# Patient Record
Sex: FEMALE | Race: WHITE
[De-identification: names, ages, dates, MRNs, and addresses within clinical notes are randomized per-mention and may not be internally consistent; named-entity substitution may affect disease eponyms.]

---

## 2018-02-25 ENCOUNTER — HOSPITAL ENCOUNTER (OUTPATIENT)
Dept: HOSPITAL 88 - ER | Age: 48
Setting detail: OBSERVATION
LOS: 3 days | Discharge: HOME | End: 2018-02-28
Attending: INTERNAL MEDICINE | Admitting: INTERNAL MEDICINE
Payer: COMMERCIAL

## 2018-02-25 VITALS — SYSTOLIC BLOOD PRESSURE: 134 MMHG | DIASTOLIC BLOOD PRESSURE: 74 MMHG

## 2018-02-25 VITALS — DIASTOLIC BLOOD PRESSURE: 74 MMHG | SYSTOLIC BLOOD PRESSURE: 134 MMHG

## 2018-02-25 VITALS — BODY MASS INDEX: 16.1 KG/M2 | HEIGHT: 60 IN | WEIGHT: 82.02 LBS

## 2018-02-25 DIAGNOSIS — H81.393: Primary | ICD-10-CM

## 2018-02-25 DIAGNOSIS — G89.29: ICD-10-CM

## 2018-02-25 DIAGNOSIS — R55: ICD-10-CM

## 2018-02-25 DIAGNOSIS — F17.210: ICD-10-CM

## 2018-02-25 DIAGNOSIS — R07.89: ICD-10-CM

## 2018-02-25 LAB
ALBUMIN SERPL-MCNC: 4.4 G/DL (ref 3.5–5)
ALBUMIN/GLOB SERPL: 1.2 {RATIO} (ref 0.8–2)
ALP SERPL-CCNC: 88 IU/L (ref 40–150)
ALT SERPL-CCNC: 50 IU/L (ref 0–55)
AMPHETAMINES UR QL SCN>1000 NG/ML: NEGATIVE
ANION GAP SERPL CALC-SCNC: 21.5 MMOL/L (ref 8–16)
BASOPHILS # BLD AUTO: 0 10*3/UL (ref 0–0.1)
BASOPHILS NFR BLD AUTO: 0.6 % (ref 0–1)
BENZODIAZ UR QL SCN: POSITIVE
BUN SERPL-MCNC: < 5 MG/DL (ref 7–26)
BUN/CREAT SERPL: 8 (ref 6–25)
CALCIUM SERPL-MCNC: 9.5 MG/DL (ref 8.4–10.2)
CHLORIDE SERPL-SCNC: 95 MMOL/L (ref 98–107)
CK MB SERPL-MCNC: 0.8 NG/ML (ref 0–5)
CK SERPL-CCNC: 83 IU/L (ref 29–168)
CO2 SERPL-SCNC: 23 MMOL/L (ref 22–29)
DEPRECATED FTI SERPL-MCNC: 1.7 UG/DL (ref 1.4–3.8)
DEPRECATED NEUTROPHILS # BLD AUTO: 4.5 10*3/UL (ref 2.1–6.9)
EGFRCR SERPLBLD CKD-EPI 2021: > 60 ML/MIN (ref 60–?)
EOSINOPHIL # BLD AUTO: 0 10*3/UL (ref 0–0.4)
EOSINOPHIL NFR BLD AUTO: 0.4 % (ref 0–6)
ERYTHROCYTE [DISTWIDTH] IN CORD BLOOD: 12.6 % (ref 11.7–14.4)
GLOBULIN PLAS-MCNC: 3.7 G/DL (ref 2.3–3.5)
GLUCOSE SERPLBLD-MCNC: 124 MG/DL (ref 74–118)
HCT VFR BLD AUTO: 40 % (ref 34.2–44.1)
HGB BLD-MCNC: 14 G/DL (ref 12–16)
LYMPHOCYTES # BLD: 0.4 10*3/UL (ref 1–3.2)
LYMPHOCYTES NFR BLD AUTO: 6.8 % (ref 18–39.1)
MCH RBC QN AUTO: 33.7 PG (ref 28–32)
MCHC RBC AUTO-ENTMCNC: 35 G/DL (ref 31–35)
MCV RBC AUTO: 96.4 FL (ref 81–99)
MONOCYTES # BLD AUTO: 0.6 10*3/UL (ref 0.2–0.8)
MONOCYTES NFR BLD AUTO: 10.1 % (ref 4.4–11.3)
NEUTS SEG NFR BLD AUTO: 81.5 % (ref 38.7–80)
PCP UR QL SCN: NEGATIVE
PLATELET # BLD AUTO: 91 X10E3/UL (ref 140–360)
POTASSIUM SERPL-SCNC: 3.5 MMOL/L (ref 3.5–5.1)
RBC # BLD AUTO: 4.15 X10E6/UL (ref 3.6–5.1)
SODIUM SERPL-SCNC: 136 MMOL/L (ref 136–145)
TSH SERPL DL<=0.005 MIU/L-ACNC: 2.48 UIU/ML (ref 0.35–4.94)

## 2018-02-25 PROCEDURE — 71046 X-RAY EXAM CHEST 2 VIEWS: CPT

## 2018-02-25 PROCEDURE — 80307 DRUG TEST PRSMV CHEM ANLYZR: CPT

## 2018-02-25 PROCEDURE — 93017 CV STRESS TEST TRACING ONLY: CPT

## 2018-02-25 PROCEDURE — 70450 CT HEAD/BRAIN W/O DYE: CPT

## 2018-02-25 PROCEDURE — 84479 ASSAY OF THYROID (T3 OR T4): CPT

## 2018-02-25 PROCEDURE — 36415 COLL VENOUS BLD VENIPUNCTURE: CPT

## 2018-02-25 PROCEDURE — 93306 TTE W/DOPPLER COMPLETE: CPT

## 2018-02-25 PROCEDURE — 80053 COMPREHEN METABOLIC PANEL: CPT

## 2018-02-25 PROCEDURE — 82553 CREATINE MB FRACTION: CPT

## 2018-02-25 PROCEDURE — 99284 EMERGENCY DEPT VISIT MOD MDM: CPT

## 2018-02-25 PROCEDURE — 82550 ASSAY OF CK (CPK): CPT

## 2018-02-25 PROCEDURE — 84443 ASSAY THYROID STIM HORMONE: CPT

## 2018-02-25 PROCEDURE — 84484 ASSAY OF TROPONIN QUANT: CPT

## 2018-02-25 PROCEDURE — 78452 HT MUSCLE IMAGE SPECT MULT: CPT

## 2018-02-25 PROCEDURE — 80061 LIPID PANEL: CPT

## 2018-02-25 PROCEDURE — 87400 INFLUENZA A/B EACH AG IA: CPT

## 2018-02-25 PROCEDURE — 85025 COMPLETE CBC W/AUTO DIFF WBC: CPT

## 2018-02-25 PROCEDURE — 84436 ASSAY OF TOTAL THYROXINE: CPT

## 2018-02-25 PROCEDURE — 93005 ELECTROCARDIOGRAM TRACING: CPT

## 2018-02-25 PROCEDURE — 93880 EXTRACRANIAL BILAT STUDY: CPT

## 2018-02-25 NOTE — XMS REPORT
Patient Summary Document

 Created on: 2018



ROCK HILL

External Reference #: [id number]

: 1970

Sex: Female



Demographics







 Address  [address] [place] [address]

 

 Home Phone  [phone]

 

 Preferred Language  Unknown

 

 Marital Status  Unknown

 

 Rastafari Affiliation  Unknown

 

 Race  Unknown

 

 Additional Race(s)  

 

 Ethnic Group  Unknown





Author







 Author  [facility]

 

 [facility]

 

 Address  Unknown

 

 Phone  Unavailable







Care Team Providers







 Care Team Member Name  Role  Phone

 

 JESSICA SEYMOUR  Unavailable  Unavailable







Problems

This patient has no known problems.



Allergies, Adverse Reactions, Alerts

This patient has no known allergies or adverse reactions.



Medications

This patient has no known medications.



Results







 Test Description  Test Time  Test Comments  Text Results  Atomic Results  
Result Comments









 CHEST 2 VIEWS            [place] [address]      Patient Name: ROCK HILL   
MR #: [id number]    : 1970 Age/Sex: 47/F  Acct #: [id number] Req #: 
[id number]  Adm Physician:     Ordered by: JESSICA SEYMOUR MD  Report #: [id number]
[id number]   Location: ER  Room/Bed:     _____________________________________________
______________________________________________________    Procedure: [id number] 
DX/CHEST 2 VIEWS  Exam Date: 18                            Exam Time: 
1642       REPORT STATUS: Signed    EXAMINATION:  CHEST 2 VIEWS    2018 4:
38 PM      COMPARISON:  None      INDICATION: Chills           DISCUSSION:   
LINES:  None.      LUNGS:  The lungs are well inflated and clear. No pneumonia 
or pulmonary edema.      PLEURA:  No pleural effusion or pneumothorax.      
HEART AND MEDIASTINUM:  The cardiomediastinal silhouette is unremarkable.      
BONES AND SOFT TISSUES:  No acute osseous lesion. The soft tissues are normal. 
        IMPRESSION:    No acute cardiopulmonary disease.      Alberto Pelaez MD
      Signed by: STEPHANIE DavilaD. on 2018 5:29 PM        
Dictated By: ALBERTO PELAEZ MD  Electronically Signed By: ALBERTO PELAEZ MD 
on 18  Transcribed By: LEROY on 18       COPY TO:   
JESSICA SEYMOUR MD

## 2018-02-25 NOTE — DIAGNOSTIC IMAGING REPORT
EXAMINATION:  CHEST 2 VIEWS    2/25/2018 4:38 PM



COMPARISON:  None



INDICATION: Chills

     

DISCUSSION:

LINES:  None.



LUNGS:  The lungs are well inflated and clear. No pneumonia or pulmonary edema.



PLEURA:  No pleural effusion or pneumothorax.



HEART AND MEDIASTINUM:  The cardiomediastinal silhouette is unremarkable.



BONES AND SOFT TISSUES:  No acute osseous lesion. The soft tissues are normal.





IMPRESSION: 

No acute cardiopulmonary disease.



Dustin Pelaez MD



Signed by: Dr. Dustin Pelaez M.D. on 2/25/2018 5:29 PM

## 2018-02-26 VITALS — DIASTOLIC BLOOD PRESSURE: 79 MMHG | SYSTOLIC BLOOD PRESSURE: 133 MMHG

## 2018-02-26 VITALS — DIASTOLIC BLOOD PRESSURE: 56 MMHG | SYSTOLIC BLOOD PRESSURE: 119 MMHG

## 2018-02-26 VITALS — SYSTOLIC BLOOD PRESSURE: 104 MMHG | DIASTOLIC BLOOD PRESSURE: 71 MMHG

## 2018-02-26 VITALS — SYSTOLIC BLOOD PRESSURE: 125 MMHG | DIASTOLIC BLOOD PRESSURE: 70 MMHG

## 2018-02-26 VITALS — DIASTOLIC BLOOD PRESSURE: 77 MMHG | SYSTOLIC BLOOD PRESSURE: 125 MMHG

## 2018-02-26 VITALS — DIASTOLIC BLOOD PRESSURE: 76 MMHG | SYSTOLIC BLOOD PRESSURE: 137 MMHG

## 2018-02-26 VITALS — DIASTOLIC BLOOD PRESSURE: 72 MMHG | SYSTOLIC BLOOD PRESSURE: 132 MMHG

## 2018-02-26 VITALS — SYSTOLIC BLOOD PRESSURE: 132 MMHG | DIASTOLIC BLOOD PRESSURE: 72 MMHG

## 2018-02-26 VITALS — SYSTOLIC BLOOD PRESSURE: 133 MMHG | DIASTOLIC BLOOD PRESSURE: 79 MMHG

## 2018-02-26 VITALS — SYSTOLIC BLOOD PRESSURE: 129 MMHG | DIASTOLIC BLOOD PRESSURE: 71 MMHG

## 2018-02-26 LAB
CHOLEST SERPL-MCNC: 207 MD/DL (ref 0–199)
CHOLEST/HDLC SERPL: 1.8 {RATIO} (ref 3–3.6)
CK MB SERPL-MCNC: 0.7 NG/ML (ref 0–5)
CK MB SERPL-MCNC: 0.7 NG/ML (ref 0–5)
CK SERPL-CCNC: 58 IU/L (ref 29–168)
CK SERPL-CCNC: 64 IU/L (ref 29–168)
HDLC SERPL-MSCNC: 115 MG/DL (ref 40–60)
LDLC SERPL CALC-MCNC: 83 MG/DL (ref 60–130)
TRIGL SERPL-MCNC: 43 MG/DL (ref 0–149)

## 2018-02-26 RX ADMIN — MECLIZINE HYDROCHLORIDE SCH MG: 12.5 TABLET ORAL at 20:21

## 2018-02-26 RX ADMIN — NICOTINE SCH MG: 7 PATCH, EXTENDED RELEASE TRANSDERMAL at 10:40

## 2018-02-26 RX ADMIN — MECLIZINE HYDROCHLORIDE SCH MG: 12.5 TABLET ORAL at 14:34

## 2018-02-26 NOTE — DIAGNOSTIC IMAGING REPORT
Examination: CT BRAIN WITHOUT CONTRAST



History:Vertigo. Syncope.

Comparison studies:None



Technique:

Axial images were obtained from the skull base to the vertex.

Coronal and sagittal images reconstructed from the axial data.

Intravenous contrast: None



Findings:



Scalp: No abnormalities.

Bones: No fractures, blastic or lytic lesions.



Brain sulci: Mild volume loss for age.

Ventricles: Normal in size and configuration. No hydrocephalus.



Extra-axial space:

No abnormalities.



Parenchyma: 

No abnormal densities. 

No masses, hemorrhage, or acute or chronic cortical based vascular insults..



Sellar/suprasellar region: No abnormalities.

Craniocervical junction: Patent foramen magnum. No Chiari one malformation.



Incidental findings: 

None.



Impression:

 

1.  No acute intracranial abnormalities.



2.  Mild volume loss.



Signed by: Dr. Roz Walters M.D. on 2/26/2018 5:00 PM

## 2018-02-26 NOTE — HISTORY AND PHYSICAL
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 

discomfort, dizziness for 24 hours, losing her balance, vertigo when she 

turns her head.  History of amblyopia, history of blindness in the right 

eye, history of surgery in the past.  She had an episode of near syncope 

when everything seemed to go black.  She was sweating and shaking.  She has 

had 8 back surgeries, back pain, degenerative disease.  



FAMILY HISTORY:  Positive for cirrhosis due to hepatitis.  



She smokes 6 cigarettes a day and was a heavy smoker in the past.  Drinks 4 

beers a day.  



Takes Ativan as needed.  Worked in [employer].  



ALLERGIES:  NO KNOWN ALLERGIES.  



Blind in the right eye.  No respiratory problems.  No  problems.  No 

depression.  No heart problems.  No stomach problems.  Degenerative disease 

of the spine.  Blind in right eye.  Apparent vertigo.



PHYSICAL EXAMINATION 

VITALS:  Temperature 97.5, pulse 74, respirations 18, blood pressure 

132/71.

HEENT:  Head is normocephalic and atraumatic.  Eyes:  Extraocular movements 

intact. 

GENERAL:  She is a slight white female in no acute distress.  Appears 

stated age.  

LUNGS:  Clear.

HEART:  Regular rhythm.

ABDOMEN:  Nontender.  

EXTREMITIES:  Not edematous.  



Cholesterol 207, LDL 83, HDL 1115.  Normal TSH.  Hemoglobin 14.  Blood 

sugar 124.  



Will request neurology opinion.  Cardiology has already been consulted.  

Cigarette smoking advised.  Trial of nicotine patch. 



Thank you for this kind referral. Chest x-ray is clear.  









DD:  02/26/2018 09:16

DT:  02/26/2018 09:23

Job#:  [id number]

## 2018-02-26 NOTE — CONSULTATION
DATE OF CONSULTATION:  February 26, 2018, at 3 o'clock in the evening.



NEUROLOGICAL CONSULTATION



A patient of Dr. Kamron Thornton.



REASON FOR CONSULTATION:  Vertigo.



HISTORY OF PRESENT ILLNESS:  This is a 47-year-old female who yesterday 

morning woke up at approximately around 9 o'clock when suddenly she started 

having an episode of dizziness, vertigo which she described as spinning 

sensation associated with nausea, vomiting, unable to ambulate. She is 

complaining of some blurred vision but no diplopia, no speech nor 

swallowing difficulty, no facial paresthesia, no paresthesia of the lips or 

tongue, no focal paresthesia, no focal weakness. Up to about 3 o'clock she 

continued with the vertigo, aggravated when she turned the head to either 

side, when she sat up or she bent over. So, finally her  brought her 

to the emergency room for further evaluation and management.



The patient describes some previous episodes like this 2 or 3 years ago, 

similar to this, but she never was evaluated for that.



At the present time, she is feeling better but still dizzy.



PAST HISTORY:  She denies hypertension, diabetes mellitus, heart condition, 

but she has multiple back surgeries and fusion where apparently they put 

some screws there but she is not having any particular back pain.



ALLERGIES:  NONE KNOWN. 



SOCIAL HISTORY:  She smokes approximately 6 cigarettes a day. She drinks 4 

beers every evening. She is , lives with her .



PHYSICAL EXAMINATION

VITAL SIGNS:  Blood pressure 130/80, pulse 76 regular, afebrile.

LUNGS:  Clear to auscultation.

HEART:  Regular sinus rhythm. No murmur.

ABDOMEN:  Soft. No tenderness. No organomegaly.

MUSCULOSKELETAL/LOWER EXTREMITIES:  No edema or cyanosis, no clubbing. 

NEUROLOGIC

Mental status:  She is oriented x3. Speech clear, no dysarthria or 

dysphagia. 

Cranial nerves:  Pupils were both equal and reactive. External ocular 

movements were full. 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 

childhood. No facial weakness. Facial sensation normal. Tongue protrudes 

midline. Palatal movements normal.

Motor power:  Upper and lower extremities:  She is able to elevate against 

gravity without any difficulty. Strength upper extremities:  Abduction of 

the arms 5/5. Flexion and extension of the arms 5/5. Dorsiflexion of the 

wrists 5/5. Hand  5/5 bilaterally. Lower extremities:  Straight leg 

raising leg 80 degrees without any discomfort. Flexion of the hips 5/5. 

Flexion and extension of the knees 5/5. Dorsiflexion of the ankles 5/5. 

Plantar flexion5/5.

Deep tendon reflexes:  Triceps, biceps, radials 1+.  Knee jerks 1+ 

bilaterally. Ankle jerks absent bilaterally. Plantar stimulation is down 

bilaterally.

Coordination:  Finger-to-nose is normal.

HEAD:  Normocephalic. No tenderness to pressure.

NECK:  Supple. Normal range of motion with aggravation with the dizziness. 

Carotid pulsations were present bilaterally. There were no bruits.

GAIT:  I asked the patient to stand up slow. She is able to do it and is 

able to walk alone without any unsteadiness. She is able to turn around 

slow. The Romberg test negative.



It seems that the patient is feeling better but still dizzy. 



IMPRESSION:   Episode of vertigo, etiology probably peripheral vestibular 

dysfunction.



There is no evidence to suspect posterior fossa TIA.



We cannot do an MRI because she has some metal in the back after several 

surgeries. Will request a CT scan of the brain without contrast. Most 

likely by tomorrow she will be doing much better. 



In the meantime, continue with the meclizine.





 





DD:  02/26/2018 15:39

DT:  02/26/2018 16:20

Job#:  [id number]

## 2018-02-26 NOTE — CONSULTATION
DATE OF CONSULTATION:  February 26, 2018 



CARDIOLOGY CONSULTATION 



REQUESTING PHYSICIAN:  Dr. Tolentino. 



REASON FOR CONSULTATION:  Chest pain. 



HISTORY OF PRESENT ILLNESS:  This is a 47-year-old woman without 

significant past medical history who presents with complaints of dizziness 

and chest pain.  She began feeling dizzy around 10 a.m. yesterday.  This 

occurred while she was sitting down and was worse with turning her head or 

looking down.  There were times where she felt like she was about to black 

out because of this.  She attempted eating a sandwich thinking it may be 

her sugar without any improvement, so she presented to the ER for further 

evaluation.  In addition, she began having chest pain approximately 2/10 in 

severity around the same time.  The pain felt like a twitching pressure 

sensation that lasted seconds off and on throughout the day and was 

associated with diaphoresis.  There was no radiation of shortness of breath 

or nausea.  



REVIEW OF SYSTEMS:  Negative except as per HPI.  



PAST MEDICAL HISTORY:  None.  



PAST SURGICAL HISTORY:  Back surgery x8.  Eye surgery.



ALLERGIES:  PLEASE SEE EMR.  



MEDICATIONS:  Please see medication list.  



SOCIAL HISTORY:  She smoked up to half a pack a day for the last 30 years.  

She drinks 4 beers a day.  She denies any illicit drugs.  



FAMILY HISTORY:  Noncontributory.   



PHYSICAL EXAMINATION 

VITAL SIGNS:  Temperature 97.5 degrees, pulse 74, respiratory rate 18, 

blood pressure 132/72, oxygen saturation 99% on room air. 

GENERAL: A well-nourished, well-developed woman in no acute distress.  

HEENT:  Normocephalic, atraumatic.  Pupils are equal.  No scleral icterus. 

NECK:  Supple.  No thyromegaly or cervical lymphadenopathy.  No carotid 

bruits. 

LUNGS:  Clear to auscultation bilaterally.  No wheezes or crackles. 

CARDIOVASCULAR:  Normal rate, regular rhythm.  No murmurs.  Normal S1 and 

S2.  

ABDOMEN:  Soft and nontender. 

EXTREMITIES:  No edema. 



LABORATORY DATA:  WBC 5.45, hemoglobin 14, hematocrit 40, platelets 91,000, 

sodium 136, potassium 3.5, chloride 95, CO2 of 23, BUN less than 5, 

creatinine 0.62, troponin less than 0.001.  Cholesterol 207, LDL 83, HDL 

115, triglycerides 43.  



Chest x-ray no acute cardiopulmonary disease.  EKG, sinus tachycardia, 

right atrial enlargement.



IMPRESSION 

1. Dizziness/presyncope. 

2. Chest pain. 



RECOMMENDATIONS:  The patient's presyncope is most consistent with vertigo; 

however, we will check orthostatic vitals.  Continue monitoring on 

telemetry for any arrhythmias that may explain her symptoms.  Obtain 

echocardiogram and carotid Dopplers.  Given the patient's risk factors, she 

warrants ischemic evaluation with pharmacologic nuclear stress test.  She 

is unable to use treadmill due to her vertigo.  Unfortunately, she had 

caffeine this morning.  If the patient remains admitted through tomorrow, 

plan for stress test in the morning, otherwise, she can follow up for 

outpatient nuclear stress test instead.  



Thank you for this consult.  We will continue to follow. 



 



 





DD:  02/26/2018 15:51

DT:  02/26/2018 16:15

Job#:  [id number]

## 2018-02-27 VITALS — DIASTOLIC BLOOD PRESSURE: 68 MMHG | SYSTOLIC BLOOD PRESSURE: 112 MMHG

## 2018-02-27 VITALS — DIASTOLIC BLOOD PRESSURE: 81 MMHG | SYSTOLIC BLOOD PRESSURE: 103 MMHG

## 2018-02-27 VITALS — SYSTOLIC BLOOD PRESSURE: 121 MMHG | DIASTOLIC BLOOD PRESSURE: 75 MMHG

## 2018-02-27 VITALS — DIASTOLIC BLOOD PRESSURE: 70 MMHG | SYSTOLIC BLOOD PRESSURE: 120 MMHG

## 2018-02-27 VITALS — DIASTOLIC BLOOD PRESSURE: 63 MMHG | SYSTOLIC BLOOD PRESSURE: 107 MMHG

## 2018-02-27 VITALS — DIASTOLIC BLOOD PRESSURE: 80 MMHG | SYSTOLIC BLOOD PRESSURE: 119 MMHG

## 2018-02-27 VITALS — SYSTOLIC BLOOD PRESSURE: 146 MMHG | DIASTOLIC BLOOD PRESSURE: 83 MMHG

## 2018-02-27 RX ADMIN — Medication SCH MG: at 17:43

## 2018-02-27 RX ADMIN — MECLIZINE HYDROCHLORIDE SCH MG: 12.5 TABLET ORAL at 05:24

## 2018-02-27 RX ADMIN — MECLIZINE HYDROCHLORIDE SCH MG: 12.5 TABLET ORAL at 13:19

## 2018-02-27 RX ADMIN — FLUTICASONE PROPIONATE SCH EA: 50 SPRAY, METERED NASAL at 17:43

## 2018-02-27 RX ADMIN — MECLIZINE HYDROCHLORIDE SCH MG: 12.5 TABLET ORAL at 21:15

## 2018-02-27 RX ADMIN — HEPARIN SODIUM SCH UNIT: 5000 INJECTION INTRAVENOUS; SUBCUTANEOUS at 21:00

## 2018-02-27 RX ADMIN — NICOTINE SCH MG: 7 PATCH, EXTENDED RELEASE TRANSDERMAL at 08:34

## 2018-02-27 NOTE — PROGRESS NOTE
DATE:  February 27, 2018 



CARDIOLOGY PROGRESS NOTE 



SUBJECTIVE:  Patient denies chest pain or shortness of breath.  She reports 

her dizziness is 

improved but remains present. 



OBJECTIVE

VITALS:  Temperature 97.4 degrees, pulse 97, respiratory rate 18, blood 

pressure 112/68, oxygen saturation 98% on room air.

GENERAL:  Awake, alert, in no acute distress.

LUNGS:  Clear to auscultation bilaterally.  No wheezes or crackles. 

CARDIOVASCULAR:  Normal rate, regular rhythm.  No murmur.  Normal S1 and 

S2.  

ABDOMEN:  Soft and nontender. 

EXTREMITIES:  No edema. 



CARDIAC MEDICATIONS 

1. Nicotine patch 7 mg topically daily.

2. Heparin 5,000 units subcutaneous q.12 h. 



LABS:  None today.



TELEMETRY:  Normal sinus rhythm.



IMPRESSION 

1. Dizziness/presyncope. 

2. Chest pain. 



RECOMMENDATIONS:  The patient will proceed with pharmacologic nuclear 

stress test today to further evaluate her chest pain.  Suspect the 

patient's dizziness is most likely vertigo based on the patient's 

description.  Continue current cardiac medications.  Discussed heart 

healthy, low-fat, low-cholesterol diet and exercise with the patient due to 

hyperlipidemia.  



Thank you for this consult.  We will continue to follow. 







DD:  02/27/2018 19:56

DT:  02/27/2018 19:58

Job#:  [id number]

## 2018-02-27 NOTE — CARDIOLOGY REPORT
DATE OF STUDY:  February 27, 2018 





PROCEDURE TITLE  Rest stress single isotope SPECT imaging with 

pharmacologic stress and gated SPECT imaging.



INDICATIONS:  Chest pain. 



PROCEDURE:  Pharmacologic stress testing was performed with regadenoson per 

protocol.  The heart rate was 85 beats per minute at baseline and increased 

to 139 beats per minute during the regadenoson infusion.  The rest blood 

pressure was 116/69 and increased to 134/76 mmHg, which is a normal 

response.  The patient did not develop any significant symptoms.  Resting 

electrocardiogram demonstrated normal sinus rhythm.  There were no ST 

segment changes consistent with myocardial ischemia.  



Myocardial perfusion imaging was performed at rest following the injection 

of 29 mCi of tetrofosmin.  At peak pharmacologic effect, the patient was 

injected with 27 mCi of tetrofosmin.  Gated post stress tomographic imaging 

was performed.



FINDINGS: The overall quality of the study is fair.  Left ventricular 

cavity is noted to be normal size on the rest and stress studies.  Gated 

SPECT imaging reveals normal myocardial thickening and wall motion.  The 

left ventricular ejection fraction was calculated to be 69%.





IMPRESSION:  Myocardial perfusion imaging is normal.  Overall left 

ventricular systolic function was normal without regional wall motion 

abnormalities. 







DD:  02/27/2018 15:11

DT:  02/27/2018 15:21

Job#:  [id number]



cc:IVY EVANS M.D.

## 2018-02-28 VITALS — DIASTOLIC BLOOD PRESSURE: 73 MMHG | SYSTOLIC BLOOD PRESSURE: 132 MMHG

## 2018-02-28 VITALS — DIASTOLIC BLOOD PRESSURE: 64 MMHG | SYSTOLIC BLOOD PRESSURE: 136 MMHG

## 2018-02-28 RX ADMIN — HEPARIN SODIUM SCH UNIT: 5000 INJECTION INTRAVENOUS; SUBCUTANEOUS at 09:00

## 2018-02-28 RX ADMIN — MECLIZINE HYDROCHLORIDE SCH MG: 12.5 TABLET ORAL at 05:03

## 2018-02-28 RX ADMIN — FLUTICASONE PROPIONATE SCH EA: 50 SPRAY, METERED NASAL at 09:00

## 2018-02-28 RX ADMIN — Medication SCH MG: at 09:00

## 2018-02-28 RX ADMIN — NICOTINE SCH MG: 7 PATCH, EXTENDED RELEASE TRANSDERMAL at 09:19

## 2018-02-28 NOTE — DISCHARGE SUMMARY
Patient of Dr. Ball.  



A charming but unfortunate 47-year-old woman admitted with vertigo and some 

blurring of vision, history of chronic back pain with multiple surgeries, 

history of smoking 6 cigarettes a day and 4 beers in the evening.   

and lives with her .  She also had atypical chest pain.  Cardiac 

evaluation was essentially normal.  Diagnosed with peripheral vestibular 

dysfunction.  She was treated with Antivert and improved.  Is discharged to 

be followed by Dr. Blal as an outpatient.  Hemoglobin was 14.  TSH was 

2.47.  Cholesterol 207, LDL 83 and .  She is discharged much 

improved.  She is ambulating and has no complaints today.  



 



 _________________________________

REGINA HERNANDEZ MD



DD:  02/28/2018 09:20

DT:  02/28/2018 09:30

Job#:  [id number]

## 2018-07-31 ENCOUNTER — HOSPITAL ENCOUNTER (INPATIENT)
Dept: HOSPITAL 88 - ER | Age: 48
LOS: 17 days | Discharge: NURSING, REHAB OR LONG-TERM CARE | DRG: 4 | End: 2018-08-17
Attending: INTERNAL MEDICINE | Admitting: INTERNAL MEDICINE
Payer: COMMERCIAL

## 2018-07-31 VITALS — SYSTOLIC BLOOD PRESSURE: 149 MMHG | DIASTOLIC BLOOD PRESSURE: 85 MMHG

## 2018-07-31 VITALS — DIASTOLIC BLOOD PRESSURE: 81 MMHG | SYSTOLIC BLOOD PRESSURE: 124 MMHG

## 2018-07-31 VITALS — SYSTOLIC BLOOD PRESSURE: 144 MMHG | DIASTOLIC BLOOD PRESSURE: 82 MMHG

## 2018-07-31 VITALS — SYSTOLIC BLOOD PRESSURE: 142 MMHG | DIASTOLIC BLOOD PRESSURE: 83 MMHG

## 2018-07-31 VITALS — SYSTOLIC BLOOD PRESSURE: 86 MMHG | DIASTOLIC BLOOD PRESSURE: 63 MMHG

## 2018-07-31 VITALS — SYSTOLIC BLOOD PRESSURE: 159 MMHG | DIASTOLIC BLOOD PRESSURE: 83 MMHG

## 2018-07-31 VITALS — SYSTOLIC BLOOD PRESSURE: 135 MMHG | DIASTOLIC BLOOD PRESSURE: 80 MMHG

## 2018-07-31 VITALS — DIASTOLIC BLOOD PRESSURE: 85 MMHG | SYSTOLIC BLOOD PRESSURE: 129 MMHG

## 2018-07-31 VITALS — SYSTOLIC BLOOD PRESSURE: 141 MMHG | DIASTOLIC BLOOD PRESSURE: 84 MMHG

## 2018-07-31 VITALS — SYSTOLIC BLOOD PRESSURE: 127 MMHG | DIASTOLIC BLOOD PRESSURE: 76 MMHG

## 2018-07-31 VITALS — DIASTOLIC BLOOD PRESSURE: 81 MMHG | SYSTOLIC BLOOD PRESSURE: 122 MMHG

## 2018-07-31 VITALS — SYSTOLIC BLOOD PRESSURE: 95 MMHG | DIASTOLIC BLOOD PRESSURE: 73 MMHG

## 2018-07-31 VITALS — DIASTOLIC BLOOD PRESSURE: 75 MMHG | SYSTOLIC BLOOD PRESSURE: 103 MMHG

## 2018-07-31 VITALS — SYSTOLIC BLOOD PRESSURE: 140 MMHG | DIASTOLIC BLOOD PRESSURE: 87 MMHG

## 2018-07-31 VITALS — DIASTOLIC BLOOD PRESSURE: 87 MMHG | SYSTOLIC BLOOD PRESSURE: 122 MMHG

## 2018-07-31 VITALS — SYSTOLIC BLOOD PRESSURE: 93 MMHG | DIASTOLIC BLOOD PRESSURE: 61 MMHG

## 2018-07-31 VITALS — DIASTOLIC BLOOD PRESSURE: 87 MMHG | SYSTOLIC BLOOD PRESSURE: 140 MMHG

## 2018-07-31 VITALS — WEIGHT: 121.56 LBS | HEIGHT: 60 IN | BODY MASS INDEX: 23.87 KG/M2

## 2018-07-31 VITALS — DIASTOLIC BLOOD PRESSURE: 81 MMHG | SYSTOLIC BLOOD PRESSURE: 126 MMHG

## 2018-07-31 VITALS — DIASTOLIC BLOOD PRESSURE: 82 MMHG | SYSTOLIC BLOOD PRESSURE: 138 MMHG

## 2018-07-31 VITALS — DIASTOLIC BLOOD PRESSURE: 76 MMHG | SYSTOLIC BLOOD PRESSURE: 137 MMHG

## 2018-07-31 VITALS — DIASTOLIC BLOOD PRESSURE: 80 MMHG | SYSTOLIC BLOOD PRESSURE: 127 MMHG

## 2018-07-31 DIAGNOSIS — D84.9: ICD-10-CM

## 2018-07-31 DIAGNOSIS — F10.20: ICD-10-CM

## 2018-07-31 DIAGNOSIS — D69.59: ICD-10-CM

## 2018-07-31 DIAGNOSIS — R09.02: ICD-10-CM

## 2018-07-31 DIAGNOSIS — J96.21: ICD-10-CM

## 2018-07-31 DIAGNOSIS — R19.7: ICD-10-CM

## 2018-07-31 DIAGNOSIS — E88.09: ICD-10-CM

## 2018-07-31 DIAGNOSIS — F17.210: ICD-10-CM

## 2018-07-31 DIAGNOSIS — F50.2: ICD-10-CM

## 2018-07-31 DIAGNOSIS — E87.1: ICD-10-CM

## 2018-07-31 DIAGNOSIS — D70.9: ICD-10-CM

## 2018-07-31 DIAGNOSIS — K76.0: ICD-10-CM

## 2018-07-31 DIAGNOSIS — E44.1: ICD-10-CM

## 2018-07-31 DIAGNOSIS — D61.818: ICD-10-CM

## 2018-07-31 DIAGNOSIS — J80: ICD-10-CM

## 2018-07-31 DIAGNOSIS — K76.6: ICD-10-CM

## 2018-07-31 DIAGNOSIS — K70.31: ICD-10-CM

## 2018-07-31 DIAGNOSIS — R65.21: ICD-10-CM

## 2018-07-31 DIAGNOSIS — A40.8: Primary | ICD-10-CM

## 2018-07-31 DIAGNOSIS — E87.6: ICD-10-CM

## 2018-07-31 DIAGNOSIS — K31.89: ICD-10-CM

## 2018-07-31 DIAGNOSIS — J69.0: ICD-10-CM

## 2018-07-31 LAB
ALBUMIN SERPL-MCNC: 2.3 G/DL (ref 3.5–5)
ALBUMIN/GLOB SERPL: 0.6 {RATIO} (ref 0.8–2)
ALP SERPL-CCNC: 63 IU/L (ref 40–150)
ALT SERPL-CCNC: 51 IU/L (ref 0–55)
ANION GAP SERPL CALC-SCNC: 16.2 MMOL/L (ref 8–16)
ANION GAP SERPL CALC-SCNC: 18.5 MMOL/L (ref 8–16)
ANISOCYTOSIS BLD QL SMEAR: (no result)
BASE EXCESS BLDA CALC-SCNC: -11 MMOL/L (ref -2–3)
BASE EXCESS BLDA CALC-SCNC: -11 MMOL/L (ref -2–3)
BASE EXCESS BLDA CALC-SCNC: -12 MMOL/L (ref -2–3)
BASOPHILS # BLD AUTO: 0 10*3/UL (ref 0–0.1)
BASOPHILS NFR BLD AUTO: 1.4 % (ref 0–1)
BNP BLD-MCNC: 758.3 PG/ML (ref 0–100)
BUN SERPL-MCNC: 23 MG/DL (ref 7–26)
BUN SERPL-MCNC: 34 MG/DL (ref 7–26)
BUN/CREAT SERPL: 33 (ref 6–25)
BUN/CREAT SERPL: 40 (ref 6–25)
CALCIUM SERPL-MCNC: 7.4 MG/DL (ref 8.4–10.2)
CALCIUM SERPL-MCNC: 8.4 MG/DL (ref 8.4–10.2)
CHLORIDE SERPL-SCNC: 105 MMOL/L (ref 98–107)
CHLORIDE SERPL-SCNC: 89 MMOL/L (ref 98–107)
CK MB SERPL-MCNC: 3.9 NG/ML (ref 0–5)
CK SERPL-CCNC: 1566 IU/L (ref 29–168)
CO2 SERPL-SCNC: 16 MMOL/L (ref 22–29)
CO2 SERPL-SCNC: 21 MMOL/L (ref 22–29)
DEPRECATED APTT PLAS QN: 29.2 SECONDS (ref 23.8–35.5)
DEPRECATED INR PLAS: 1.04
DEPRECATED NEUTROPHILS # BLD AUTO: 1.3 10*3/UL (ref 2.1–6.9)
EGFRCR SERPLBLD CKD-EPI 2021: > 60 ML/MIN (ref 60–?)
EGFRCR SERPLBLD CKD-EPI 2021: > 60 ML/MIN (ref 60–?)
EOSINOPHIL # BLD AUTO: 0 10*3/UL (ref 0–0.4)
EOSINOPHIL NFR BLD AUTO: 0.7 % (ref 0–6)
ERYTHROCYTE [DISTWIDTH] IN CORD BLOOD: 11.9 % (ref 11.7–14.4)
GLOBULIN PLAS-MCNC: 3.7 G/DL (ref 2.3–3.5)
GLUCOSE SERPLBLD-MCNC: 84 MG/DL (ref 74–118)
GLUCOSE SERPLBLD-MCNC: 94 MG/DL (ref 74–118)
HCO3 BLDA-SCNC: 14 MMOL/L (ref 23–28)
HCO3 BLDA-SCNC: 14 MMOL/L (ref 23–28)
HCO3 BLDA-SCNC: 16 MMOL/L (ref 23–28)
HCT VFR BLD AUTO: 32.1 % (ref 34.2–44.1)
HGB BLD-MCNC: 12.1 G/DL (ref 12–16)
HIV1+2 AB SPEC QL IA.RAPID: (no result)
LYMPHOCYTES # BLD: 0.1 10*3/UL (ref 1–3.2)
LYMPHOCYTES NFR BLD AUTO: 4.1 % (ref 18–39.1)
MCH RBC QN AUTO: 34.3 PG (ref 28–32)
MCHC RBC AUTO-ENTMCNC: 37.7 G/DL (ref 31–35)
MCV RBC AUTO: 90.9 FL (ref 81–99)
MONOCYTES # BLD AUTO: 0.1 10*3/UL (ref 0.2–0.8)
MONOCYTES NFR BLD AUTO: 5.4 % (ref 4.4–11.3)
NEUTS SEG NFR BLD AUTO: 88.4 % (ref 38.7–80)
PCO2 BLDA: 221 MMHG (ref 80–105)
PCO2 BLDA: 23 MMHG (ref 41–51)
PCO2 BLDA: 23 MMHG (ref 41–51)
PCO2 BLDA: 41 MMHG (ref 41–51)
PCO2 BLDA: 50 MMHG (ref 80–105)
PCO2 BLDA: 53 MMHG (ref 80–105)
PH BLDA: 7.2 [PH] (ref 7.31–7.41)
PH BLDA: 7.38 [PH] (ref 7.31–7.41)
PH BLDA: 7.39 [PH] (ref 7.31–7.41)
PLAT MORPH BLD: (no result)
PLATELET # BLD AUTO: 72 X10E3/UL (ref 140–360)
PLATELET # BLD EST: (no result) 10*3/UL
POIKILOCYTOSIS BLD QL SMEAR: SLIGHT
POTASSIUM SERPL-SCNC: 2.2 MMOL/L (ref 3.5–5.1)
POTASSIUM SERPL-SCNC: 2.5 MMOL/L (ref 3.5–5.1)
PROTHROMBIN TIME: 12.8 SECONDS (ref 11.9–14.5)
RBC # BLD AUTO: 3.53 X10E6/UL (ref 3.6–5.1)
RBC MORPH BLD: NORMAL
SAO2 % BLDA: 100 % (ref 95–98)
SAO2 % BLDA: 86 % (ref 95–98)
SAO2 % BLDA: 88 % (ref 95–98)
SODIUM SERPL-SCNC: 126 MMOL/L (ref 136–145)
SODIUM SERPL-SCNC: 135 MMOL/L (ref 136–145)
TSH SERPL DL<=0.005 MIU/L-ACNC: 1.9 UIU/ML (ref 0.35–4.94)

## 2018-07-31 PROCEDURE — 76604 US EXAM CHEST: CPT

## 2018-07-31 PROCEDURE — 82805 BLOOD GASES W/O2 SATURATION: CPT

## 2018-07-31 PROCEDURE — 93306 TTE W/DOPPLER COMPLETE: CPT

## 2018-07-31 PROCEDURE — 32555 ASPIRATE PLEURA W/ IMAGING: CPT

## 2018-07-31 PROCEDURE — 87186 SC STD MICRODIL/AGAR DIL: CPT

## 2018-07-31 PROCEDURE — 36600 WITHDRAWAL OF ARTERIAL BLOOD: CPT

## 2018-07-31 PROCEDURE — 84100 ASSAY OF PHOSPHORUS: CPT

## 2018-07-31 PROCEDURE — 83880 ASSAY OF NATRIURETIC PEPTIDE: CPT

## 2018-07-31 PROCEDURE — 94640 AIRWAY INHALATION TREATMENT: CPT

## 2018-07-31 PROCEDURE — 71260 CT THORAX DX C+: CPT

## 2018-07-31 PROCEDURE — 71045 X-RAY EXAM CHEST 1 VIEW: CPT

## 2018-07-31 PROCEDURE — 87070 CULTURE OTHR SPECIMN AEROBIC: CPT

## 2018-07-31 PROCEDURE — 96361 HYDRATE IV INFUSION ADD-ON: CPT

## 2018-07-31 PROCEDURE — 87116 MYCOBACTERIA CULTURE: CPT

## 2018-07-31 PROCEDURE — 82553 CREATINE MB FRACTION: CPT

## 2018-07-31 PROCEDURE — 85025 COMPLETE CBC W/AUTO DIFF WBC: CPT

## 2018-07-31 PROCEDURE — 80202 ASSAY OF VANCOMYCIN: CPT

## 2018-07-31 PROCEDURE — 89051 BODY FLUID CELL COUNT: CPT

## 2018-07-31 PROCEDURE — 76700 US EXAM ABDOM COMPLETE: CPT

## 2018-07-31 PROCEDURE — 82550 ASSAY OF CK (CPK): CPT

## 2018-07-31 PROCEDURE — 87493 C DIFF AMPLIFIED PROBE: CPT

## 2018-07-31 PROCEDURE — 82140 ASSAY OF AMMONIA: CPT

## 2018-07-31 PROCEDURE — 86900 BLOOD TYPING SEROLOGIC ABO: CPT

## 2018-07-31 PROCEDURE — 94003 VENT MGMT INPAT SUBQ DAY: CPT

## 2018-07-31 PROCEDURE — 96366 THER/PROPH/DIAG IV INF ADDON: CPT

## 2018-07-31 PROCEDURE — 94002 VENT MGMT INPAT INIT DAY: CPT

## 2018-07-31 PROCEDURE — 85610 PROTHROMBIN TIME: CPT

## 2018-07-31 PROCEDURE — 82270 OCCULT BLOOD FECES: CPT

## 2018-07-31 PROCEDURE — 86850 RBC ANTIBODY SCREEN: CPT

## 2018-07-31 PROCEDURE — 0BH17EZ INSERTION OF ENDOTRACHEAL AIRWAY INTO TRACHEA, VIA NATURAL OR ARTIFICIAL OPENING: ICD-10-PCS | Performed by: INTERNAL MEDICINE

## 2018-07-31 PROCEDURE — 80048 BASIC METABOLIC PNL TOTAL CA: CPT

## 2018-07-31 PROCEDURE — 70450 CT HEAD/BRAIN W/O DYE: CPT

## 2018-07-31 PROCEDURE — 85730 THROMBOPLASTIN TIME PARTIAL: CPT

## 2018-07-31 PROCEDURE — 83615 LACTATE (LD) (LDH) ENZYME: CPT

## 2018-07-31 PROCEDURE — 85027 COMPLETE CBC AUTOMATED: CPT

## 2018-07-31 PROCEDURE — 84157 ASSAY OF PROTEIN OTHER: CPT

## 2018-07-31 PROCEDURE — 85384 FIBRINOGEN ACTIVITY: CPT

## 2018-07-31 PROCEDURE — 84443 ASSAY THYROID STIM HORMONE: CPT

## 2018-07-31 PROCEDURE — 85007 BL SMEAR W/DIFF WBC COUNT: CPT

## 2018-07-31 PROCEDURE — 87102 FUNGUS ISOLATION CULTURE: CPT

## 2018-07-31 PROCEDURE — 71250 CT THORAX DX C-: CPT

## 2018-07-31 PROCEDURE — 82784 ASSAY IGA/IGD/IGG/IGM EACH: CPT

## 2018-07-31 PROCEDURE — 5A1955Z RESPIRATORY VENTILATION, GREATER THAN 96 CONSECUTIVE HOURS: ICD-10-PCS | Performed by: INTERNAL MEDICINE

## 2018-07-31 PROCEDURE — 87071 CULTURE AEROBIC QUANT OTHER: CPT

## 2018-07-31 PROCEDURE — 86920 COMPATIBILITY TEST SPIN: CPT

## 2018-07-31 PROCEDURE — 83735 ASSAY OF MAGNESIUM: CPT

## 2018-07-31 PROCEDURE — 87081 CULTURE SCREEN ONLY: CPT

## 2018-07-31 PROCEDURE — 85379 FIBRIN DEGRADATION QUANT: CPT

## 2018-07-31 PROCEDURE — 87040 BLOOD CULTURE FOR BACTERIA: CPT

## 2018-07-31 PROCEDURE — 83605 ASSAY OF LACTIC ACID: CPT

## 2018-07-31 PROCEDURE — 84484 ASSAY OF TROPONIN QUANT: CPT

## 2018-07-31 PROCEDURE — 93005 ELECTROCARDIOGRAM TRACING: CPT

## 2018-07-31 PROCEDURE — 80053 COMPREHEN METABOLIC PANEL: CPT

## 2018-07-31 PROCEDURE — 74470 X-RAY EXAM OF KIDNEY LESION: CPT

## 2018-07-31 PROCEDURE — 97597 DBRDMT OPN WND 1ST 20 CM/<: CPT

## 2018-07-31 PROCEDURE — 87390 HIV-1 AG IA: CPT

## 2018-07-31 PROCEDURE — 87205 SMEAR GRAM STAIN: CPT

## 2018-07-31 PROCEDURE — 82330 ASSAY OF CALCIUM: CPT

## 2018-07-31 PROCEDURE — 82948 REAGENT STRIP/BLOOD GLUCOSE: CPT

## 2018-07-31 PROCEDURE — 36415 COLL VENOUS BLD VENIPUNCTURE: CPT

## 2018-07-31 PROCEDURE — 87206 SMEAR FLUORESCENT/ACID STAI: CPT

## 2018-07-31 PROCEDURE — 36569 INSJ PICC 5 YR+ W/O IMAGING: CPT

## 2018-07-31 PROCEDURE — 99285 EMERGENCY DEPT VISIT HI MDM: CPT

## 2018-07-31 RX ADMIN — Medication SCH ML: at 12:45

## 2018-07-31 RX ADMIN — Medication SCH ML: at 23:00

## 2018-07-31 RX ADMIN — FILGRASTIM SCH MCG: 480 INJECTION, SOLUTION INTRAVENOUS; SUBCUTANEOUS at 17:40

## 2018-07-31 RX ADMIN — Medication SCH MG: at 17:10

## 2018-07-31 RX ADMIN — Medication SCH ML: at 00:10

## 2018-07-31 RX ADMIN — PANTOPRAZOLE SODIUM SCH MG: 40 TABLET, DELAYED RELEASE ORAL at 17:10

## 2018-07-31 RX ADMIN — Medication SCH ML: at 19:20

## 2018-07-31 RX ADMIN — PANTOPRAZOLE SODIUM SCH MG: 40 TABLET, DELAYED RELEASE ORAL at 17:29

## 2018-07-31 RX ADMIN — VANCOMYCIN HYDROCHLORIDE SCH MLS/HR: 500 INJECTION, SOLUTION INTRAVENOUS at 20:30

## 2018-07-31 RX ADMIN — Medication SCH ML: at 16:10

## 2018-07-31 RX ADMIN — Medication SCH MG: at 16:30

## 2018-07-31 RX ADMIN — DEXTROSE, SODIUM CHLORIDE, AND POTASSIUM CHLORIDE SCH MLS/HR: 5; .9; .15 INJECTION INTRAVENOUS at 17:36

## 2018-07-31 RX ADMIN — TAZOBACTAM SODIUM AND PIPERACILLIN SODIUM SCH MLS/HR: 375; 3 INJECTION, SOLUTION INTRAVENOUS at 18:35

## 2018-07-31 RX ADMIN — NICOTINE SCH MG: 7 PATCH, EXTENDED RELEASE TRANSDERMAL at 15:00

## 2018-07-31 RX ADMIN — Medication PRN MG: at 20:30

## 2018-07-31 NOTE — DIAGNOSTIC IMAGING REPORT
PROCEDURE: CHEST SINGLE (PORTABLE)

COMPARISON: 2/25/2018.

INDICATIONS: WEAKNESS. DIZZINESS. POSTERIOR NECK PAIN

 

FINDINGS:



Lungs are well-inflated. There is hazy right lower lobe opacity with 

suspected pleural effusion. Left lung is clear. Cardiomediastinal 

contour is stable.

 

No acute osseous abnormalities.

 

CONCLUSION:

Right pleural effusion with associated right lower lobe airspace 

disease, which may reflect passive atelectasis or pneumonia in the 

appropriate clinical setting. Followup chest radiograph in 6-8 weeks is 

suggested to document resolution. 

Dictated by:  Kamron Rutledge M.D. on 7/31/2018 at 9:42     

Electronically approved by:  Kamron Rutledge M.D. on 7/31/2018 at 9:42

## 2018-07-31 NOTE — CONSULTATION
DATE OF CONSULTATION:  July 31, 2018 



PULMONARY CONSULTATION 



A [facility] 47-year-old woman with a history of heavy drinking over the 

weekend and vomiting, drinking vodka.  She had persistent pain on the right 

side, shortness of breath and dizziness.  She admits to choking, but denies 

aspiration of sold food.  No low-grade fever, no chills, dry cough.  Weight 

loss 15 pounds over the last year.  She works in sales.  Born in 

[place], [place].  



FAMILY HISTORY:  Noncontributory.   



ALLERGIES:  NO KNOWN DRUG ALLERGIES.  



MEDICATIONS:  No regular medications.  Has been taking NicoDerm and 

meclizine.  



PAST SURGICAL HISTORY:  She had 8 back surgeries related to sciatica.



PHYSICAL EXAMINATION 

GENERAL:  A frail, anxious white female lying supine.  Difficult for her to 

take deep breaths or move to her left side.  

VITAL SIGNS:  Temperature 98.8, pulse 105, blood pressure 101/68.

HEAD:  Normocephalic, atraumatic. 

LUNGS:  Bronchial breath sounds right lower lobe. 

HEART:  Regular rhythm. 

ABDOMEN:  Nontender. 

EXTREMITIES:  Not edematous.  



Likely aspiration pneumonia. Pancytopenia with white count of only 1.48.  

Hemoglobin is 12 and platelet count 72,000.  HIV screen was negative.  

Sodium 126, potassium 2.5.  BUN ____.   



IMPRESSION AND PLAN:  Massive aspiration pneumonia.  The patient may 

require bronchoscopy if not doing better.  Continue bronchodilators and 

empiric antibiotic therapy.  Will tentatively schedule bronchoscopy.   



Thank you for this kind referral.  



 





DD:  07/31/2018 16:30

DT:  07/31/2018 17:51

Job#:  [id number]

## 2018-07-31 NOTE — DIAGNOSTIC IMAGING REPORT
PROCEDURE:

A single AP view of the chest.

 

COMPARISON: 7/31/2018

 

INDICATIONS:   S/P INTUBATION, NG TUBE PLACEMENT

     

FINDINGS:

Lines/tubes: The patient has been intubated. The endotracheal tube has 

its tip about 2-3 cm above the jennifer.

 

NG/OG tube tip is in the stomach.

 

Lungs: Right lower lobe consolidation, unchanged. Stable left lower 

lobe opacities.

 

Pleura: Small right pleural effusion.

 

Heart and mediastinum:  The heart and the mediastinum are unremarkable.

 

Bones:  No acute bony abnormality. A metallic object projected over the 

lower lumbar spine is partially visualized.

 

IMPRESSION: 

Endotracheal tube has its tip about 2-3 cm above the jennifer.

NG/OG tube tip is in the stomach.

Multifocal pneumonia, appearing unchanged.

 

Dictated by:  Dustin Pelaez M.D. on 7/31/2018 at 19:19     

Electronically approved by:  Dustin Pelaez M.D. on 7/31/2018 at 19:19

## 2018-07-31 NOTE — DIAGNOSTIC IMAGING REPORT
PROCEDURE:

CT scan of the chest  WITH intravenous contrast, using standard 

protocol.

 

TECHNIQUE:

The chest was scanned utilizing a multidetector helical scanner from 

the lung apex through the level of the adrenal glands after the IV 

administration of 65 cc of Isovue 370.  Coronal and sagittal 

multiplanar reformations were obtained.

 

COMPARISON: Same day chest radiograph

 

INDICATIONS:  SHORT OF BREATH

    

FINDINGS:

Examination is slightly limited by respiratory motion artifact.

 

Lines/tubes:  None.

 

Pulmonary Arteries: Technically adequate examination. No evidence of 

pulmonary embolus. The main pulmonary artery is normal in caliber, 

measuring 2.6 cm.

 

Lungs and Airways: There is a near complete atelectasis and 

consolidation of the right lower lobe. There are patchy centrilobular 

opacities in the posterior segment of the right upper lobe.

 

Patchy, centrilobular opacities in the left lung base. There are a few 

groundglass centrilobular nodules in the left upper lobe.

 

Pleura: Small right pleural effusion. No pneumothorax.

 

Heart and mediastinum: The thyroid gland is normal. No significant 

mediastinal, hilar or axillary lymphadenopathy is seen. The heart and 

pericardium are within normal limits.

 

Soft tissues: Bilateral breast implants are present.

 

Abdomen: Limited views of the upper abdomen show no specific 

abnormality.

 

Bones: The visualized bony thorax is within normal limits. 

 

IMPRESSION: 

 

No pulmonary embolus.

 

Near complete atelectasis and consolidation of the right lower lobe. 

Patchy opacities in left lung base. The findings are suspicious for 

multifocal pneumonia.

 

 

Dictated by:  Dustin Pelaez M.D. on 7/31/2018 at 11:18     

Electronically approved by:  Dustin Pelaez M.D. on 7/31/2018 at 11:18

## 2018-07-31 NOTE — HISTORY AND PHYSICAL
Ms. Hicks is a complex 47-year-old woman with multiple medical problems 

who presented to the emergency room with progressive shortness of breath 

and cough.  



HISTORY OF PRESENT ILLNESS:  The patient's family tells us that she was 

drinking "shots of alcohol" on Friday evening, the 27th of July when she 

vomited and then apparently was felt to have lethargy due to hangover, 

according to the family, but she continues to be short breath with a dry 

cough and not feeling well.



PAST MEDICAL HISTORY:  Significant for cirrhosis felt to be due to 

hepatitis in the past.  She had surgery for amblyopia as a child.  She had 

previous back surgeries, previous hospitalizations for vertigo.



REVIEW OF SYSTEMS:  The patient reports she thinks she may have lost as 

much as 20 pounds of weight in the last year.  



PHYSICAL EXAM: 

GENERAL:  At this time shows a white woman who looks younger than her 

stated age. 

VITAL SIGNS:  Blood pressure 100/50. 

HEENT:  Relatively unremarkable. 

THORAX:  Heart sounds S1 and S2 are equal and regular. 

LUNGS:  Bilateral faint rhonchi. 

ABDOMEN:  Scaphoid with normal bowel sounds.

EXTREMITIES:  Thin.  



PERSONAL/SOCIAL HISTORY:  The patient's family admits that she continues to 

smoke cigarettes as well.  



PERTINENT LABORATORY STUDIES:  Hemoglobin 12.1, hematocrit 32.1, white 

count 1.4 thousand and platelets 72,000.  Chemistry shows sodium 126, 

potassium 2.5, chloride 89, bicarbonate 21, glucose 84, BUN 34, creatinine 

0.8.  BNP is 758.  Troponin is negative.  AST is 161.   HIV is negative.  

D-dimer is 4770.  Chest x-ray shows bilateral infiltrates suggesting 

aspiration pneumonia.



ASSESSMENT:  

1. Bilateral pneumonia/aspiration.

2. Alcohol abuse.

3. Hyponatremia.

4. Hypokalemia.

5. Neutropenia.

6. Elevated BNP with history of echocardiogram reported as normal 

ejection fraction in February of 2018 here to [facility].



PLAN:   Will hydrate and use normal saline, broad-spectrum antibiotics.  

Will recheck EKG and echocardiogram.  Appreciate help from Dr. Hernandez and 

Dr. Jackson.  Further management based on clinical course.  



 



DD:  07/31/2018 17:20

DT:  07/31/2018 17:39

Job#:  [id number] 



cc:REGINA HERNANDEZ MD 

cc:              DR. JACKSON.

## 2018-07-31 NOTE — DIAGNOSTIC IMAGING REPORT
Examination: CT BRAIN WITHOUT CONTRAST



History:Dizziness. Weakness. Vertigo. Falls.

Comparison studies:Head CT performed February 26, 2018



Technique:

Axial images were obtained from the skull base to the vertex.

Coronal and sagittal images reconstructed from the axial data.

Intravenous contrast: None



Findings:



Scalp: No abnormalities.

Bones: No fractures, blastic or lytic lesions.



Brain sulci: Mild volume loss for age particularly of the bilateral cerebellar

hemispheres.

Ventricles: Normal in size and configuration. No hydrocephalus.



Extra-axial space:

No abnormalities.



Parenchyma: 

No abnormal densities. 

No masses, hemorrhage, or acute or chronic cortical based vascular insults..



Sellar/suprasellar region: No abnormalities.

Craniocervical junction: Patent foramen magnum. No Chiari one malformation.



Incidental findings: 

None.



Impression:

 

1.  No new intracranial abnormalities when compared to prior head CT performed

February 26, 2018.



2.  Unchanged mild volume loss particularly the bilateral cerebellar

hemispheres.



Signed by: Dr. Roz Walters M.D. on 7/31/2018 9:46 AM

## 2018-08-01 VITALS — SYSTOLIC BLOOD PRESSURE: 80 MMHG | DIASTOLIC BLOOD PRESSURE: 53 MMHG

## 2018-08-01 VITALS — DIASTOLIC BLOOD PRESSURE: 67 MMHG | SYSTOLIC BLOOD PRESSURE: 98 MMHG

## 2018-08-01 VITALS — SYSTOLIC BLOOD PRESSURE: 117 MMHG | DIASTOLIC BLOOD PRESSURE: 76 MMHG

## 2018-08-01 VITALS — DIASTOLIC BLOOD PRESSURE: 55 MMHG | SYSTOLIC BLOOD PRESSURE: 83 MMHG

## 2018-08-01 VITALS — SYSTOLIC BLOOD PRESSURE: 91 MMHG | DIASTOLIC BLOOD PRESSURE: 61 MMHG

## 2018-08-01 VITALS — DIASTOLIC BLOOD PRESSURE: 95 MMHG | SYSTOLIC BLOOD PRESSURE: 142 MMHG

## 2018-08-01 VITALS — DIASTOLIC BLOOD PRESSURE: 57 MMHG | SYSTOLIC BLOOD PRESSURE: 85 MMHG

## 2018-08-01 VITALS — SYSTOLIC BLOOD PRESSURE: 100 MMHG | DIASTOLIC BLOOD PRESSURE: 70 MMHG

## 2018-08-01 VITALS — SYSTOLIC BLOOD PRESSURE: 86 MMHG | DIASTOLIC BLOOD PRESSURE: 55 MMHG

## 2018-08-01 VITALS — SYSTOLIC BLOOD PRESSURE: 94 MMHG | DIASTOLIC BLOOD PRESSURE: 67 MMHG

## 2018-08-01 VITALS — DIASTOLIC BLOOD PRESSURE: 58 MMHG | SYSTOLIC BLOOD PRESSURE: 81 MMHG

## 2018-08-01 VITALS — SYSTOLIC BLOOD PRESSURE: 97 MMHG | DIASTOLIC BLOOD PRESSURE: 68 MMHG

## 2018-08-01 VITALS — SYSTOLIC BLOOD PRESSURE: 84 MMHG | DIASTOLIC BLOOD PRESSURE: 55 MMHG

## 2018-08-01 VITALS — SYSTOLIC BLOOD PRESSURE: 79 MMHG | DIASTOLIC BLOOD PRESSURE: 54 MMHG

## 2018-08-01 VITALS — DIASTOLIC BLOOD PRESSURE: 54 MMHG | SYSTOLIC BLOOD PRESSURE: 81 MMHG

## 2018-08-01 VITALS — DIASTOLIC BLOOD PRESSURE: 51 MMHG | SYSTOLIC BLOOD PRESSURE: 85 MMHG

## 2018-08-01 VITALS — SYSTOLIC BLOOD PRESSURE: 81 MMHG | DIASTOLIC BLOOD PRESSURE: 66 MMHG

## 2018-08-01 VITALS — DIASTOLIC BLOOD PRESSURE: 71 MMHG | SYSTOLIC BLOOD PRESSURE: 109 MMHG

## 2018-08-01 VITALS — DIASTOLIC BLOOD PRESSURE: 62 MMHG | SYSTOLIC BLOOD PRESSURE: 95 MMHG

## 2018-08-01 VITALS — DIASTOLIC BLOOD PRESSURE: 60 MMHG | SYSTOLIC BLOOD PRESSURE: 86 MMHG

## 2018-08-01 VITALS — SYSTOLIC BLOOD PRESSURE: 82 MMHG | DIASTOLIC BLOOD PRESSURE: 54 MMHG

## 2018-08-01 VITALS — SYSTOLIC BLOOD PRESSURE: 82 MMHG | DIASTOLIC BLOOD PRESSURE: 55 MMHG

## 2018-08-01 VITALS — DIASTOLIC BLOOD PRESSURE: 51 MMHG | SYSTOLIC BLOOD PRESSURE: 75 MMHG

## 2018-08-01 VITALS — SYSTOLIC BLOOD PRESSURE: 102 MMHG | DIASTOLIC BLOOD PRESSURE: 57 MMHG

## 2018-08-01 VITALS — DIASTOLIC BLOOD PRESSURE: 57 MMHG | SYSTOLIC BLOOD PRESSURE: 89 MMHG

## 2018-08-01 VITALS — SYSTOLIC BLOOD PRESSURE: 82 MMHG | DIASTOLIC BLOOD PRESSURE: 53 MMHG

## 2018-08-01 VITALS — DIASTOLIC BLOOD PRESSURE: 54 MMHG | SYSTOLIC BLOOD PRESSURE: 90 MMHG

## 2018-08-01 VITALS — DIASTOLIC BLOOD PRESSURE: 54 MMHG | SYSTOLIC BLOOD PRESSURE: 86 MMHG

## 2018-08-01 VITALS — SYSTOLIC BLOOD PRESSURE: 112 MMHG | DIASTOLIC BLOOD PRESSURE: 73 MMHG

## 2018-08-01 VITALS — SYSTOLIC BLOOD PRESSURE: 87 MMHG | DIASTOLIC BLOOD PRESSURE: 58 MMHG

## 2018-08-01 VITALS — DIASTOLIC BLOOD PRESSURE: 50 MMHG | SYSTOLIC BLOOD PRESSURE: 78 MMHG

## 2018-08-01 VITALS — SYSTOLIC BLOOD PRESSURE: 80 MMHG | DIASTOLIC BLOOD PRESSURE: 57 MMHG

## 2018-08-01 VITALS — DIASTOLIC BLOOD PRESSURE: 55 MMHG | SYSTOLIC BLOOD PRESSURE: 90 MMHG

## 2018-08-01 VITALS — DIASTOLIC BLOOD PRESSURE: 81 MMHG | SYSTOLIC BLOOD PRESSURE: 115 MMHG

## 2018-08-01 VITALS — SYSTOLIC BLOOD PRESSURE: 83 MMHG | DIASTOLIC BLOOD PRESSURE: 53 MMHG

## 2018-08-01 VITALS — SYSTOLIC BLOOD PRESSURE: 149 MMHG | DIASTOLIC BLOOD PRESSURE: 93 MMHG

## 2018-08-01 VITALS — SYSTOLIC BLOOD PRESSURE: 109 MMHG | DIASTOLIC BLOOD PRESSURE: 60 MMHG

## 2018-08-01 VITALS — DIASTOLIC BLOOD PRESSURE: 52 MMHG | SYSTOLIC BLOOD PRESSURE: 88 MMHG

## 2018-08-01 VITALS — DIASTOLIC BLOOD PRESSURE: 55 MMHG | SYSTOLIC BLOOD PRESSURE: 84 MMHG

## 2018-08-01 VITALS — SYSTOLIC BLOOD PRESSURE: 75 MMHG | DIASTOLIC BLOOD PRESSURE: 58 MMHG

## 2018-08-01 VITALS — DIASTOLIC BLOOD PRESSURE: 59 MMHG | SYSTOLIC BLOOD PRESSURE: 101 MMHG

## 2018-08-01 VITALS — SYSTOLIC BLOOD PRESSURE: 99 MMHG | DIASTOLIC BLOOD PRESSURE: 60 MMHG

## 2018-08-01 VITALS — SYSTOLIC BLOOD PRESSURE: 88 MMHG | DIASTOLIC BLOOD PRESSURE: 54 MMHG

## 2018-08-01 VITALS — SYSTOLIC BLOOD PRESSURE: 81 MMHG | DIASTOLIC BLOOD PRESSURE: 57 MMHG

## 2018-08-01 VITALS — SYSTOLIC BLOOD PRESSURE: 79 MMHG | DIASTOLIC BLOOD PRESSURE: 55 MMHG

## 2018-08-01 VITALS — DIASTOLIC BLOOD PRESSURE: 56 MMHG | SYSTOLIC BLOOD PRESSURE: 76 MMHG

## 2018-08-01 VITALS — SYSTOLIC BLOOD PRESSURE: 138 MMHG | DIASTOLIC BLOOD PRESSURE: 101 MMHG

## 2018-08-01 VITALS — SYSTOLIC BLOOD PRESSURE: 83 MMHG | DIASTOLIC BLOOD PRESSURE: 62 MMHG

## 2018-08-01 VITALS — SYSTOLIC BLOOD PRESSURE: 96 MMHG | DIASTOLIC BLOOD PRESSURE: 66 MMHG

## 2018-08-01 VITALS — DIASTOLIC BLOOD PRESSURE: 87 MMHG | SYSTOLIC BLOOD PRESSURE: 132 MMHG

## 2018-08-01 VITALS — SYSTOLIC BLOOD PRESSURE: 80 MMHG | DIASTOLIC BLOOD PRESSURE: 59 MMHG

## 2018-08-01 VITALS — SYSTOLIC BLOOD PRESSURE: 88 MMHG | DIASTOLIC BLOOD PRESSURE: 58 MMHG

## 2018-08-01 VITALS — SYSTOLIC BLOOD PRESSURE: 85 MMHG | DIASTOLIC BLOOD PRESSURE: 54 MMHG

## 2018-08-01 VITALS — SYSTOLIC BLOOD PRESSURE: 76 MMHG | DIASTOLIC BLOOD PRESSURE: 57 MMHG

## 2018-08-01 VITALS — SYSTOLIC BLOOD PRESSURE: 88 MMHG | DIASTOLIC BLOOD PRESSURE: 68 MMHG

## 2018-08-01 VITALS — DIASTOLIC BLOOD PRESSURE: 72 MMHG | SYSTOLIC BLOOD PRESSURE: 108 MMHG

## 2018-08-01 VITALS — SYSTOLIC BLOOD PRESSURE: 94 MMHG | DIASTOLIC BLOOD PRESSURE: 51 MMHG

## 2018-08-01 VITALS — SYSTOLIC BLOOD PRESSURE: 119 MMHG | DIASTOLIC BLOOD PRESSURE: 100 MMHG

## 2018-08-01 VITALS — SYSTOLIC BLOOD PRESSURE: 76 MMHG | DIASTOLIC BLOOD PRESSURE: 54 MMHG

## 2018-08-01 VITALS — SYSTOLIC BLOOD PRESSURE: 142 MMHG | DIASTOLIC BLOOD PRESSURE: 101 MMHG

## 2018-08-01 VITALS — DIASTOLIC BLOOD PRESSURE: 56 MMHG | SYSTOLIC BLOOD PRESSURE: 97 MMHG

## 2018-08-01 VITALS — DIASTOLIC BLOOD PRESSURE: 65 MMHG | SYSTOLIC BLOOD PRESSURE: 93 MMHG

## 2018-08-01 VITALS — DIASTOLIC BLOOD PRESSURE: 55 MMHG | SYSTOLIC BLOOD PRESSURE: 86 MMHG

## 2018-08-01 VITALS — DIASTOLIC BLOOD PRESSURE: 54 MMHG | SYSTOLIC BLOOD PRESSURE: 85 MMHG

## 2018-08-01 VITALS — SYSTOLIC BLOOD PRESSURE: 79 MMHG | DIASTOLIC BLOOD PRESSURE: 53 MMHG

## 2018-08-01 VITALS — SYSTOLIC BLOOD PRESSURE: 96 MMHG | DIASTOLIC BLOOD PRESSURE: 65 MMHG

## 2018-08-01 VITALS — SYSTOLIC BLOOD PRESSURE: 142 MMHG | DIASTOLIC BLOOD PRESSURE: 95 MMHG

## 2018-08-01 VITALS — SYSTOLIC BLOOD PRESSURE: 91 MMHG | DIASTOLIC BLOOD PRESSURE: 59 MMHG

## 2018-08-01 VITALS — DIASTOLIC BLOOD PRESSURE: 55 MMHG | SYSTOLIC BLOOD PRESSURE: 82 MMHG

## 2018-08-01 VITALS — SYSTOLIC BLOOD PRESSURE: 82 MMHG | DIASTOLIC BLOOD PRESSURE: 59 MMHG

## 2018-08-01 VITALS — SYSTOLIC BLOOD PRESSURE: 84 MMHG | DIASTOLIC BLOOD PRESSURE: 53 MMHG

## 2018-08-01 VITALS — DIASTOLIC BLOOD PRESSURE: 53 MMHG | SYSTOLIC BLOOD PRESSURE: 83 MMHG

## 2018-08-01 VITALS — SYSTOLIC BLOOD PRESSURE: 82 MMHG | DIASTOLIC BLOOD PRESSURE: 60 MMHG

## 2018-08-01 VITALS — DIASTOLIC BLOOD PRESSURE: 52 MMHG | SYSTOLIC BLOOD PRESSURE: 81 MMHG

## 2018-08-01 VITALS — DIASTOLIC BLOOD PRESSURE: 54 MMHG | SYSTOLIC BLOOD PRESSURE: 83 MMHG

## 2018-08-01 VITALS — SYSTOLIC BLOOD PRESSURE: 97 MMHG | DIASTOLIC BLOOD PRESSURE: 59 MMHG

## 2018-08-01 VITALS — SYSTOLIC BLOOD PRESSURE: 84 MMHG | DIASTOLIC BLOOD PRESSURE: 61 MMHG

## 2018-08-01 VITALS — DIASTOLIC BLOOD PRESSURE: 60 MMHG | SYSTOLIC BLOOD PRESSURE: 101 MMHG

## 2018-08-01 VITALS — DIASTOLIC BLOOD PRESSURE: 53 MMHG | SYSTOLIC BLOOD PRESSURE: 76 MMHG

## 2018-08-01 VITALS — DIASTOLIC BLOOD PRESSURE: 68 MMHG | SYSTOLIC BLOOD PRESSURE: 96 MMHG

## 2018-08-01 VITALS — SYSTOLIC BLOOD PRESSURE: 86 MMHG | DIASTOLIC BLOOD PRESSURE: 54 MMHG

## 2018-08-01 VITALS — SYSTOLIC BLOOD PRESSURE: 75 MMHG | DIASTOLIC BLOOD PRESSURE: 50 MMHG

## 2018-08-01 VITALS — SYSTOLIC BLOOD PRESSURE: 80 MMHG | DIASTOLIC BLOOD PRESSURE: 49 MMHG

## 2018-08-01 VITALS — SYSTOLIC BLOOD PRESSURE: 83 MMHG | DIASTOLIC BLOOD PRESSURE: 55 MMHG

## 2018-08-01 VITALS — SYSTOLIC BLOOD PRESSURE: 76 MMHG | DIASTOLIC BLOOD PRESSURE: 51 MMHG

## 2018-08-01 VITALS — DIASTOLIC BLOOD PRESSURE: 62 MMHG | SYSTOLIC BLOOD PRESSURE: 99 MMHG

## 2018-08-01 VITALS — SYSTOLIC BLOOD PRESSURE: 89 MMHG | DIASTOLIC BLOOD PRESSURE: 64 MMHG

## 2018-08-01 VITALS — DIASTOLIC BLOOD PRESSURE: 91 MMHG | SYSTOLIC BLOOD PRESSURE: 155 MMHG

## 2018-08-01 VITALS — SYSTOLIC BLOOD PRESSURE: 87 MMHG | DIASTOLIC BLOOD PRESSURE: 54 MMHG

## 2018-08-01 VITALS — SYSTOLIC BLOOD PRESSURE: 94 MMHG | DIASTOLIC BLOOD PRESSURE: 66 MMHG

## 2018-08-01 VITALS — DIASTOLIC BLOOD PRESSURE: 53 MMHG | SYSTOLIC BLOOD PRESSURE: 86 MMHG

## 2018-08-01 VITALS — DIASTOLIC BLOOD PRESSURE: 57 MMHG | SYSTOLIC BLOOD PRESSURE: 90 MMHG

## 2018-08-01 VITALS — DIASTOLIC BLOOD PRESSURE: 88 MMHG | SYSTOLIC BLOOD PRESSURE: 125 MMHG

## 2018-08-01 VITALS — SYSTOLIC BLOOD PRESSURE: 78 MMHG | DIASTOLIC BLOOD PRESSURE: 56 MMHG

## 2018-08-01 VITALS — DIASTOLIC BLOOD PRESSURE: 79 MMHG | SYSTOLIC BLOOD PRESSURE: 107 MMHG

## 2018-08-01 VITALS — SYSTOLIC BLOOD PRESSURE: 80 MMHG | DIASTOLIC BLOOD PRESSURE: 52 MMHG

## 2018-08-01 LAB
ALBUMIN SERPL-MCNC: 1.5 G/DL (ref 3.5–5)
ALBUMIN/GLOB SERPL: 0.5 {RATIO} (ref 0.8–2)
ALP SERPL-CCNC: 53 IU/L (ref 40–150)
ALT SERPL-CCNC: 35 IU/L (ref 0–55)
ANION GAP SERPL CALC-SCNC: 16.3 MMOL/L (ref 8–16)
ANION GAP SERPL CALC-SCNC: 16.4 MMOL/L (ref 8–16)
ANISOCYTOSIS BLD QL SMEAR: SLIGHT
BASOPHILS # BLD AUTO: 0 10*3/UL (ref 0–0.1)
BASOPHILS NFR BLD AUTO: 0.4 % (ref 0–1)
BUN SERPL-MCNC: 21 MG/DL (ref 7–26)
BUN SERPL-MCNC: 22 MG/DL (ref 7–26)
BUN/CREAT SERPL: 26 (ref 6–25)
BUN/CREAT SERPL: 28 (ref 6–25)
CALCIUM SERPL-MCNC: 7.2 MG/DL (ref 8.4–10.2)
CALCIUM SERPL-MCNC: 7.3 MG/DL (ref 8.4–10.2)
CHLORIDE SERPL-SCNC: 109 MMOL/L (ref 98–107)
CHLORIDE SERPL-SCNC: 112 MMOL/L (ref 98–107)
CO2 SERPL-SCNC: 15 MMOL/L (ref 22–29)
CO2 SERPL-SCNC: 16 MMOL/L (ref 22–29)
DEPRECATED NEUTROPHILS # BLD AUTO: 2.2 10*3/UL (ref 2.1–6.9)
DEPRECATED PHOSPHATE SERPL-MCNC: 3.2 MG/DL (ref 2.3–4.7)
EGFRCR SERPLBLD CKD-EPI 2021: > 60 ML/MIN (ref 60–?)
EGFRCR SERPLBLD CKD-EPI 2021: > 60 ML/MIN (ref 60–?)
EOSINOPHIL # BLD AUTO: 0 10*3/UL (ref 0–0.4)
EOSINOPHIL NFR BLD AUTO: 0.8 % (ref 0–6)
EOSINOPHIL NFR BLD MANUAL: 2 % (ref 0–7)
ERYTHROCYTE [DISTWIDTH] IN CORD BLOOD: 12.7 % (ref 11.7–14.4)
GLOBULIN PLAS-MCNC: 2.9 G/DL (ref 2.3–3.5)
GLUCOSE SERPLBLD-MCNC: 191 MG/DL (ref 74–118)
GLUCOSE SERPLBLD-MCNC: 213 MG/DL (ref 74–118)
HCT VFR BLD AUTO: 30.3 % (ref 34.2–44.1)
HGB BLD-MCNC: 10.7 G/DL (ref 12–16)
HIV1+2 AB SPEC QL IA.RAPID: (no result)
HYPOCHROMIA BLD QL SMEAR: SLIGHT
LYMPHOCYTES # BLD: 0.1 10*3/UL (ref 1–3.2)
LYMPHOCYTES NFR BLD AUTO: 2.7 % (ref 18–39.1)
LYMPHOCYTES NFR BLD MANUAL: 4 % (ref 19–48)
MACROCYTES BLD QL SMEAR: (no result)
MAGNESIUM SERPL-MCNC: 1.2 MG/DL (ref 1.3–2.1)
MCH RBC QN AUTO: 33.5 PG (ref 28–32)
MCHC RBC AUTO-ENTMCNC: 35.3 G/DL (ref 31–35)
MCV RBC AUTO: 95 FL (ref 81–99)
MONOCYTES # BLD AUTO: 0.2 10*3/UL (ref 0.2–0.8)
MONOCYTES NFR BLD AUTO: 8.6 % (ref 4.4–11.3)
MONOCYTES NFR BLD MANUAL: 8 % (ref 3.4–9)
NEUTS BAND NFR BLD MANUAL: 17 %
NEUTS SEG NFR BLD AUTO: 87.5 % (ref 38.7–80)
NEUTS SEG NFR BLD MANUAL: 63 % (ref 40–74)
PLAT MORPH BLD: (no result)
PLATELET # BLD AUTO: 59 X10E3/UL (ref 140–360)
PLATELET # BLD EST: (no result) 10*3/UL
POTASSIUM SERPL-SCNC: 2.3 MMOL/L (ref 3.5–5.1)
POTASSIUM SERPL-SCNC: 3.4 MMOL/L (ref 3.5–5.1)
RBC # BLD AUTO: 3.19 X10E6/UL (ref 3.6–5.1)
RBC MORPH BLD: NORMAL
SODIUM SERPL-SCNC: 139 MMOL/L (ref 136–145)
SODIUM SERPL-SCNC: 140 MMOL/L (ref 136–145)

## 2018-08-01 PROCEDURE — 02HV33Z INSERTION OF INFUSION DEVICE INTO SUPERIOR VENA CAVA, PERCUTANEOUS APPROACH: ICD-10-PCS

## 2018-08-01 RX ADMIN — TAZOBACTAM SODIUM AND PIPERACILLIN SODIUM SCH MLS/HR: 375; 3 INJECTION, SOLUTION INTRAVENOUS at 18:06

## 2018-08-01 RX ADMIN — TAZOBACTAM SODIUM AND PIPERACILLIN SODIUM SCH MLS/HR: 375; 3 INJECTION, SOLUTION INTRAVENOUS at 12:00

## 2018-08-01 RX ADMIN — POTASSIUM CHLORIDE, DEXTROSE MONOHYDRATE AND SODIUM CHLORIDE SCH MLS/HR: 150; 5; 450 INJECTION, SOLUTION INTRAVENOUS at 04:00

## 2018-08-01 RX ADMIN — TAZOBACTAM SODIUM AND PIPERACILLIN SODIUM SCH MLS/HR: 375; 3 INJECTION, SOLUTION INTRAVENOUS at 06:10

## 2018-08-01 RX ADMIN — POTASSIUM CHLORIDE, DEXTROSE MONOHYDRATE AND SODIUM CHLORIDE SCH MLS/HR: 150; 5; 450 INJECTION, SOLUTION INTRAVENOUS at 23:16

## 2018-08-01 RX ADMIN — Medication SCH MLS/HR: at 19:44

## 2018-08-01 RX ADMIN — NICOTINE SCH MG: 7 PATCH, EXTENDED RELEASE TRANSDERMAL at 09:00

## 2018-08-01 RX ADMIN — Medication SCH ML: at 23:00

## 2018-08-01 RX ADMIN — VANCOMYCIN HYDROCHLORIDE SCH MLS/HR: 1 INJECTION, SOLUTION INTRAVENOUS at 18:06

## 2018-08-01 RX ADMIN — SODIUM CHLORIDE SCH MG: 900 INJECTION INTRAVENOUS at 17:00

## 2018-08-01 RX ADMIN — POTASSIUM CHLORIDE, DEXTROSE MONOHYDRATE AND SODIUM CHLORIDE SCH MLS/HR: 150; 5; 450 INJECTION, SOLUTION INTRAVENOUS at 14:00

## 2018-08-01 RX ADMIN — Medication SCH ML: at 03:00

## 2018-08-01 RX ADMIN — SODIUM CHLORIDE SCH MG: 900 INJECTION INTRAVENOUS at 09:00

## 2018-08-01 RX ADMIN — DEXTROSE, SODIUM CHLORIDE, AND POTASSIUM CHLORIDE SCH MLS/HR: 5; .9; .15 INJECTION INTRAVENOUS at 02:45

## 2018-08-01 RX ADMIN — PROPOFOL PRN MLS/HR: 10 INJECTION, EMULSION INTRAVENOUS at 22:10

## 2018-08-01 RX ADMIN — Medication SCH ML: at 15:00

## 2018-08-01 RX ADMIN — Medication SCH ML: at 03:10

## 2018-08-01 RX ADMIN — PROPOFOL PRN MLS/HR: 10 INJECTION, EMULSION INTRAVENOUS at 03:34

## 2018-08-01 RX ADMIN — Medication SCH ML: at 11:00

## 2018-08-01 RX ADMIN — SODIUM CHLORIDE SCH MLS/HR: 900 INJECTION INTRAVENOUS at 09:00

## 2018-08-01 RX ADMIN — CITRIC ACID AND SODIUM CITRATE SCH ML: 640; 490 SOLUTION ORAL at 22:11

## 2018-08-01 RX ADMIN — Medication SCH ML: at 07:00

## 2018-08-01 RX ADMIN — Medication PRN MG: at 09:56

## 2018-08-01 RX ADMIN — TAZOBACTAM SODIUM AND PIPERACILLIN SODIUM SCH MLS/HR: 375; 3 INJECTION, SOLUTION INTRAVENOUS at 00:38

## 2018-08-01 RX ADMIN — Medication PRN MG: at 04:45

## 2018-08-01 RX ADMIN — FILGRASTIM SCH MCG: 480 INJECTION, SOLUTION INTRAVENOUS; SUBCUTANEOUS at 09:00

## 2018-08-01 RX ADMIN — Medication SCH ML: at 19:00

## 2018-08-01 RX ADMIN — VANCOMYCIN HYDROCHLORIDE SCH MLS/HR: 500 INJECTION, SOLUTION INTRAVENOUS at 07:15

## 2018-08-01 RX ADMIN — ACETAMINOPHEN PRN MG: 325 SOLUTION ORAL at 15:30

## 2018-08-01 NOTE — DIAGNOSTIC IMAGING REPORT
CHEST SINGLE (PORTABLE), 8/1/2018 5:00 AM



Technique: CHEST SINGLE (PORTABLE)

Comparison: 7/31/2018

Clinical history: Pneumonia



Findings:

See Impression



Impression:

1. Lines/Tubes: ET tube about 3 cm above the jennifer. A new G-tube extends

subdiaphragmatically.

2. Normal cardiomediastinal silhouette.

3. Right basilar consolidation in keeping with history of pneumonia. Underlying

right pleural effusion. Recommend follow-up upright PA and lateral.

4. Density over the hemithoraces related to breast implants.



Signed by: Dr Faith Schwartz MD on 8/1/2018 6:18 AM

## 2018-08-01 NOTE — DIAGNOSTIC IMAGING REPORT
EXAM: Limited chest ultrasound

INDICATION:        

\S\right effusion

COMPARISON: Chest x-ray of the same date. 

TECHNIQUE: Transverse and longitudinal images of the bilateral chest wall were

obtained.



FINDINGS:     

See impression.



IMPRESSION:

No right pleural effusion visualized.

Trace left pleural effusion.







   



Signed by: Dr. Zack Ware MD on 8/1/2018 4:03 PM

## 2018-08-01 NOTE — DIAGNOSTIC IMAGING REPORT
PROCEDURE:

A single AP view of the chest.

 

COMPARISON: Same date at 5:50 AM

 

INDICATIONS:   PICC LINE PLACEMENT

     

FINDINGS:

Lines/tubes: Stable endotracheal and nasogastric tubes. New right PICC 

in place with tip overlying the inferior SVC.

 

Lungs: Unchanged right mid to lower lung field opacification. Decreased 

left lower lung field opacification.

 

Pleura:  There is no visible pneumothorax.

 

Heart and mediastinum:  The heart and the mediastinum are unremarkable.

 

Bones:  No acute bony abnormality.

 

IMPRESSION: 

 

1. New right PICC in place with tip overlying interest. No visible 

pneumothorax.

2. Unchanged right mid to lower lung field opacification, likely 

pneumonia.

3. Decreased left lower lung field hazy opacification.

 

 

Dictated by:  Zack Ware M.D. on 8/01/2018 at 14:31     

Electronically approved by:  Zack Ware M.D. on 8/01/2018 at 14:31

## 2018-08-01 NOTE — CONSULTATION
DATE OF CONSULTATION:    



INFECTIOUS DISEASE CONSULT



REASON FOR CONSULTATION:  Pneumonia and sepsis on admission.



HISTORY OF PRESENT ILLNESS:  This is a patient who is a 47-year-old female. 

 Apparently the patient had been having tequila shots a few days ago.  

Apparently she threw up.  Patient was not feeling well.  Her boyfriend 

noticed that she was sick.  He thought it was probably just from alcohol.  

She was leaning to the right according to him.  The patient the morning of 

admission was not feeling well at all.  He brought her to the emergency 

room.  In the emergency room she was lethargic, had to be admitted.  The 

patient is currently intubated but alert, family at bedside.  She has 

history of liver cirrhosis, probably alcoholism, a history of multiple back 

surgeries.



ALLERGIES:  NKA.



SOCIAL HISTORY:  Alcoholism.



REVIEW OF SYSTEMS:  As above now.  The patient is alert.  Could not obtain 

information.  



LABORATORY DATA:  Her blood culture is showing streptococcus species 

alpha-hemolytic.  Her lab otherwise showed a white count of 2.55 today, was 

1.48 yesterday.  Hemoglobin 12.



PHYSICAL EXAMINATION:  

GENERAL:  She is currently alert, oriented, does not seem to be in acute 

distress. 

VITALS:  Stable.  Currently afebrile. 

HEENT:  She does not appear icteric. 

NECK:  Supple. 

CHEST:  A few rhonchi bilaterally.

HEART:  S1 and S2.  No S3 or S4.  No murmur.

ABDOMEN:  Soft.  



IMPRESSION:   

1. Sepsis on admission secondary to aspiration pneumonia.

2. Alcoholism.

3. Liver cirrhosis.  

4. Bacteremia, alpha strep.  Continue with vancomycin and Zosyn for the 

time-being.  Recheck her blood cultures.  Check echocardiogram.  Check 

HIV, hepatitis A, B and C serology.

5. Respiratory failure.  

6. Leukopenia from sepsis.  Getting better. 

7. Concerned about the _______, also liver disease from alcoholism.  



Will follow with you.  Further recommendations depending on the above 

finding.





 



DD:  08/01/2018 15:43

DT:  08/01/2018 15:55

Job#:  [id number]

## 2018-08-02 VITALS — SYSTOLIC BLOOD PRESSURE: 96 MMHG | DIASTOLIC BLOOD PRESSURE: 71 MMHG

## 2018-08-02 VITALS — DIASTOLIC BLOOD PRESSURE: 61 MMHG | SYSTOLIC BLOOD PRESSURE: 108 MMHG

## 2018-08-02 VITALS — SYSTOLIC BLOOD PRESSURE: 113 MMHG | DIASTOLIC BLOOD PRESSURE: 69 MMHG

## 2018-08-02 VITALS — DIASTOLIC BLOOD PRESSURE: 62 MMHG | SYSTOLIC BLOOD PRESSURE: 109 MMHG

## 2018-08-02 VITALS — SYSTOLIC BLOOD PRESSURE: 107 MMHG | DIASTOLIC BLOOD PRESSURE: 72 MMHG

## 2018-08-02 VITALS — SYSTOLIC BLOOD PRESSURE: 122 MMHG | DIASTOLIC BLOOD PRESSURE: 73 MMHG

## 2018-08-02 VITALS — SYSTOLIC BLOOD PRESSURE: 107 MMHG | DIASTOLIC BLOOD PRESSURE: 66 MMHG

## 2018-08-02 VITALS — SYSTOLIC BLOOD PRESSURE: 103 MMHG | DIASTOLIC BLOOD PRESSURE: 60 MMHG

## 2018-08-02 VITALS — SYSTOLIC BLOOD PRESSURE: 117 MMHG | DIASTOLIC BLOOD PRESSURE: 68 MMHG

## 2018-08-02 VITALS — SYSTOLIC BLOOD PRESSURE: 103 MMHG | DIASTOLIC BLOOD PRESSURE: 54 MMHG

## 2018-08-02 VITALS — SYSTOLIC BLOOD PRESSURE: 110 MMHG | DIASTOLIC BLOOD PRESSURE: 73 MMHG

## 2018-08-02 VITALS — DIASTOLIC BLOOD PRESSURE: 66 MMHG | SYSTOLIC BLOOD PRESSURE: 109 MMHG

## 2018-08-02 VITALS — DIASTOLIC BLOOD PRESSURE: 78 MMHG | SYSTOLIC BLOOD PRESSURE: 128 MMHG

## 2018-08-02 VITALS — DIASTOLIC BLOOD PRESSURE: 63 MMHG | SYSTOLIC BLOOD PRESSURE: 97 MMHG

## 2018-08-02 VITALS — SYSTOLIC BLOOD PRESSURE: 111 MMHG | DIASTOLIC BLOOD PRESSURE: 67 MMHG

## 2018-08-02 VITALS — DIASTOLIC BLOOD PRESSURE: 64 MMHG | SYSTOLIC BLOOD PRESSURE: 102 MMHG

## 2018-08-02 VITALS — SYSTOLIC BLOOD PRESSURE: 100 MMHG | DIASTOLIC BLOOD PRESSURE: 64 MMHG

## 2018-08-02 VITALS — DIASTOLIC BLOOD PRESSURE: 57 MMHG | SYSTOLIC BLOOD PRESSURE: 104 MMHG

## 2018-08-02 VITALS — SYSTOLIC BLOOD PRESSURE: 96 MMHG | DIASTOLIC BLOOD PRESSURE: 56 MMHG

## 2018-08-02 VITALS — SYSTOLIC BLOOD PRESSURE: 101 MMHG | DIASTOLIC BLOOD PRESSURE: 59 MMHG

## 2018-08-02 VITALS — DIASTOLIC BLOOD PRESSURE: 62 MMHG | SYSTOLIC BLOOD PRESSURE: 102 MMHG

## 2018-08-02 VITALS — DIASTOLIC BLOOD PRESSURE: 61 MMHG | SYSTOLIC BLOOD PRESSURE: 110 MMHG

## 2018-08-02 VITALS — DIASTOLIC BLOOD PRESSURE: 63 MMHG | SYSTOLIC BLOOD PRESSURE: 108 MMHG

## 2018-08-02 VITALS — DIASTOLIC BLOOD PRESSURE: 63 MMHG | SYSTOLIC BLOOD PRESSURE: 105 MMHG

## 2018-08-02 VITALS — SYSTOLIC BLOOD PRESSURE: 115 MMHG | DIASTOLIC BLOOD PRESSURE: 70 MMHG

## 2018-08-02 VITALS — DIASTOLIC BLOOD PRESSURE: 69 MMHG | SYSTOLIC BLOOD PRESSURE: 114 MMHG

## 2018-08-02 VITALS — SYSTOLIC BLOOD PRESSURE: 118 MMHG | DIASTOLIC BLOOD PRESSURE: 69 MMHG

## 2018-08-02 VITALS — DIASTOLIC BLOOD PRESSURE: 65 MMHG | SYSTOLIC BLOOD PRESSURE: 102 MMHG

## 2018-08-02 VITALS — SYSTOLIC BLOOD PRESSURE: 128 MMHG | DIASTOLIC BLOOD PRESSURE: 74 MMHG

## 2018-08-02 VITALS — DIASTOLIC BLOOD PRESSURE: 65 MMHG | SYSTOLIC BLOOD PRESSURE: 98 MMHG

## 2018-08-02 VITALS — DIASTOLIC BLOOD PRESSURE: 67 MMHG | SYSTOLIC BLOOD PRESSURE: 119 MMHG

## 2018-08-02 VITALS — DIASTOLIC BLOOD PRESSURE: 61 MMHG | SYSTOLIC BLOOD PRESSURE: 120 MMHG

## 2018-08-02 VITALS — DIASTOLIC BLOOD PRESSURE: 69 MMHG | SYSTOLIC BLOOD PRESSURE: 117 MMHG

## 2018-08-02 VITALS — DIASTOLIC BLOOD PRESSURE: 66 MMHG | SYSTOLIC BLOOD PRESSURE: 111 MMHG

## 2018-08-02 VITALS — SYSTOLIC BLOOD PRESSURE: 92 MMHG | DIASTOLIC BLOOD PRESSURE: 51 MMHG

## 2018-08-02 VITALS — SYSTOLIC BLOOD PRESSURE: 116 MMHG | DIASTOLIC BLOOD PRESSURE: 71 MMHG

## 2018-08-02 VITALS — SYSTOLIC BLOOD PRESSURE: 106 MMHG | DIASTOLIC BLOOD PRESSURE: 32 MMHG

## 2018-08-02 VITALS — DIASTOLIC BLOOD PRESSURE: 54 MMHG | SYSTOLIC BLOOD PRESSURE: 103 MMHG

## 2018-08-02 VITALS — DIASTOLIC BLOOD PRESSURE: 62 MMHG | SYSTOLIC BLOOD PRESSURE: 105 MMHG

## 2018-08-02 VITALS — SYSTOLIC BLOOD PRESSURE: 125 MMHG | DIASTOLIC BLOOD PRESSURE: 69 MMHG

## 2018-08-02 VITALS — SYSTOLIC BLOOD PRESSURE: 103 MMHG | DIASTOLIC BLOOD PRESSURE: 58 MMHG

## 2018-08-02 VITALS — SYSTOLIC BLOOD PRESSURE: 111 MMHG | DIASTOLIC BLOOD PRESSURE: 74 MMHG

## 2018-08-02 VITALS — SYSTOLIC BLOOD PRESSURE: 104 MMHG | DIASTOLIC BLOOD PRESSURE: 61 MMHG

## 2018-08-02 VITALS — SYSTOLIC BLOOD PRESSURE: 121 MMHG | DIASTOLIC BLOOD PRESSURE: 71 MMHG

## 2018-08-02 VITALS — DIASTOLIC BLOOD PRESSURE: 73 MMHG | SYSTOLIC BLOOD PRESSURE: 120 MMHG

## 2018-08-02 VITALS — SYSTOLIC BLOOD PRESSURE: 112 MMHG | DIASTOLIC BLOOD PRESSURE: 74 MMHG

## 2018-08-02 VITALS — DIASTOLIC BLOOD PRESSURE: 70 MMHG | SYSTOLIC BLOOD PRESSURE: 117 MMHG

## 2018-08-02 VITALS — SYSTOLIC BLOOD PRESSURE: 105 MMHG | DIASTOLIC BLOOD PRESSURE: 67 MMHG

## 2018-08-02 VITALS — SYSTOLIC BLOOD PRESSURE: 114 MMHG | DIASTOLIC BLOOD PRESSURE: 69 MMHG

## 2018-08-02 VITALS — SYSTOLIC BLOOD PRESSURE: 102 MMHG | DIASTOLIC BLOOD PRESSURE: 75 MMHG

## 2018-08-02 VITALS — SYSTOLIC BLOOD PRESSURE: 105 MMHG | DIASTOLIC BLOOD PRESSURE: 62 MMHG

## 2018-08-02 VITALS — DIASTOLIC BLOOD PRESSURE: 67 MMHG | SYSTOLIC BLOOD PRESSURE: 122 MMHG

## 2018-08-02 VITALS — DIASTOLIC BLOOD PRESSURE: 64 MMHG | SYSTOLIC BLOOD PRESSURE: 104 MMHG

## 2018-08-02 VITALS — DIASTOLIC BLOOD PRESSURE: 67 MMHG | SYSTOLIC BLOOD PRESSURE: 101 MMHG

## 2018-08-02 VITALS — SYSTOLIC BLOOD PRESSURE: 111 MMHG | DIASTOLIC BLOOD PRESSURE: 63 MMHG

## 2018-08-02 VITALS — SYSTOLIC BLOOD PRESSURE: 102 MMHG | DIASTOLIC BLOOD PRESSURE: 60 MMHG

## 2018-08-02 VITALS — SYSTOLIC BLOOD PRESSURE: 111 MMHG | DIASTOLIC BLOOD PRESSURE: 61 MMHG

## 2018-08-02 VITALS — DIASTOLIC BLOOD PRESSURE: 59 MMHG | SYSTOLIC BLOOD PRESSURE: 100 MMHG

## 2018-08-02 VITALS — DIASTOLIC BLOOD PRESSURE: 67 MMHG | SYSTOLIC BLOOD PRESSURE: 109 MMHG

## 2018-08-02 VITALS — DIASTOLIC BLOOD PRESSURE: 55 MMHG | SYSTOLIC BLOOD PRESSURE: 99 MMHG

## 2018-08-02 VITALS — SYSTOLIC BLOOD PRESSURE: 112 MMHG | DIASTOLIC BLOOD PRESSURE: 81 MMHG

## 2018-08-02 VITALS — SYSTOLIC BLOOD PRESSURE: 94 MMHG | DIASTOLIC BLOOD PRESSURE: 76 MMHG

## 2018-08-02 VITALS — SYSTOLIC BLOOD PRESSURE: 107 MMHG | DIASTOLIC BLOOD PRESSURE: 64 MMHG

## 2018-08-02 VITALS — SYSTOLIC BLOOD PRESSURE: 95 MMHG | DIASTOLIC BLOOD PRESSURE: 50 MMHG

## 2018-08-02 VITALS — SYSTOLIC BLOOD PRESSURE: 93 MMHG | DIASTOLIC BLOOD PRESSURE: 61 MMHG

## 2018-08-02 VITALS — DIASTOLIC BLOOD PRESSURE: 64 MMHG | SYSTOLIC BLOOD PRESSURE: 107 MMHG

## 2018-08-02 VITALS — SYSTOLIC BLOOD PRESSURE: 124 MMHG | DIASTOLIC BLOOD PRESSURE: 78 MMHG

## 2018-08-02 VITALS — SYSTOLIC BLOOD PRESSURE: 115 MMHG | DIASTOLIC BLOOD PRESSURE: 35 MMHG

## 2018-08-02 VITALS — SYSTOLIC BLOOD PRESSURE: 99 MMHG | DIASTOLIC BLOOD PRESSURE: 57 MMHG

## 2018-08-02 VITALS — DIASTOLIC BLOOD PRESSURE: 62 MMHG | SYSTOLIC BLOOD PRESSURE: 94 MMHG

## 2018-08-02 VITALS — SYSTOLIC BLOOD PRESSURE: 129 MMHG | DIASTOLIC BLOOD PRESSURE: 74 MMHG

## 2018-08-02 VITALS — DIASTOLIC BLOOD PRESSURE: 69 MMHG | SYSTOLIC BLOOD PRESSURE: 108 MMHG

## 2018-08-02 VITALS — SYSTOLIC BLOOD PRESSURE: 114 MMHG | DIASTOLIC BLOOD PRESSURE: 67 MMHG

## 2018-08-02 VITALS — DIASTOLIC BLOOD PRESSURE: 87 MMHG | SYSTOLIC BLOOD PRESSURE: 109 MMHG

## 2018-08-02 VITALS — DIASTOLIC BLOOD PRESSURE: 72 MMHG | SYSTOLIC BLOOD PRESSURE: 125 MMHG

## 2018-08-02 VITALS — DIASTOLIC BLOOD PRESSURE: 71 MMHG | SYSTOLIC BLOOD PRESSURE: 113 MMHG

## 2018-08-02 VITALS — SYSTOLIC BLOOD PRESSURE: 102 MMHG | DIASTOLIC BLOOD PRESSURE: 68 MMHG

## 2018-08-02 VITALS — DIASTOLIC BLOOD PRESSURE: 63 MMHG | SYSTOLIC BLOOD PRESSURE: 109 MMHG

## 2018-08-02 VITALS — SYSTOLIC BLOOD PRESSURE: 96 MMHG | DIASTOLIC BLOOD PRESSURE: 59 MMHG

## 2018-08-02 VITALS — DIASTOLIC BLOOD PRESSURE: 69 MMHG | SYSTOLIC BLOOD PRESSURE: 121 MMHG

## 2018-08-02 VITALS — DIASTOLIC BLOOD PRESSURE: 60 MMHG | SYSTOLIC BLOOD PRESSURE: 101 MMHG

## 2018-08-02 VITALS — DIASTOLIC BLOOD PRESSURE: 63 MMHG | SYSTOLIC BLOOD PRESSURE: 103 MMHG

## 2018-08-02 VITALS — DIASTOLIC BLOOD PRESSURE: 73 MMHG | SYSTOLIC BLOOD PRESSURE: 124 MMHG

## 2018-08-02 VITALS — DIASTOLIC BLOOD PRESSURE: 60 MMHG | SYSTOLIC BLOOD PRESSURE: 108 MMHG

## 2018-08-02 VITALS — SYSTOLIC BLOOD PRESSURE: 106 MMHG | DIASTOLIC BLOOD PRESSURE: 61 MMHG

## 2018-08-02 VITALS — DIASTOLIC BLOOD PRESSURE: 73 MMHG | SYSTOLIC BLOOD PRESSURE: 116 MMHG

## 2018-08-02 LAB
ANION GAP SERPL CALC-SCNC: 16.3 MMOL/L (ref 8–16)
ANISOCYTOSIS BLD QL SMEAR: SLIGHT
BASE EXCESS BLDA CALC-SCNC: -15 MMOL/L (ref -2–3)
BASOPHILS # BLD AUTO: 0 10*3/UL (ref 0–0.1)
BASOPHILS NFR BLD AUTO: 0.1 % (ref 0–1)
BUN SERPL-MCNC: 19 MG/DL (ref 7–26)
BUN/CREAT SERPL: 24 (ref 6–25)
CALCIUM SERPL-MCNC: 6.9 MG/DL (ref 8.4–10.2)
CHLORIDE SERPL-SCNC: 115 MMOL/L (ref 98–107)
CO2 SERPL-SCNC: 13 MMOL/L (ref 22–29)
DEPRECATED NEUTROPHILS # BLD AUTO: 9.2 10*3/UL (ref 2.1–6.9)
EGFRCR SERPLBLD CKD-EPI 2021: > 60 ML/MIN (ref 60–?)
EOSINOPHIL # BLD AUTO: 0.1 10*3/UL (ref 0–0.4)
EOSINOPHIL NFR BLD AUTO: 0.5 % (ref 0–6)
ERYTHROCYTE [DISTWIDTH] IN CORD BLOOD: 14 % (ref 11.7–14.4)
GLUCOSE SERPLBLD-MCNC: 121 MG/DL (ref 74–118)
HCO3 BLDA-SCNC: 13 MMOL/L (ref 23–28)
HCT VFR BLD AUTO: 29.4 % (ref 34.2–44.1)
HGB BLD-MCNC: 10.5 G/DL (ref 12–16)
HYPOCHROMIA BLD QL SMEAR: SLIGHT
LYMPHOCYTES # BLD: 0.1 10*3/UL (ref 1–3.2)
LYMPHOCYTES NFR BLD AUTO: 1 % (ref 18–39.1)
LYMPHOCYTES NFR BLD MANUAL: 2 % (ref 19–48)
MCH RBC QN AUTO: 33.8 PG (ref 28–32)
MCHC RBC AUTO-ENTMCNC: 35.7 G/DL (ref 31–35)
MCV RBC AUTO: 94.5 FL (ref 81–99)
MONOCYTES # BLD AUTO: 0.4 10*3/UL (ref 0.2–0.8)
MONOCYTES NFR BLD AUTO: 4.5 % (ref 4.4–11.3)
MONOCYTES NFR BLD MANUAL: 7 % (ref 3.4–9)
NEUTS BAND NFR BLD MANUAL: 18 %
NEUTS SEG NFR BLD AUTO: 93.1 % (ref 38.7–80)
NEUTS SEG NFR BLD MANUAL: 71 % (ref 40–74)
PCO2 BLDA: 32 MMHG (ref 41–51)
PCO2 BLDA: 67 MMHG (ref 80–105)
PH BLDA: 7.2 [PH] (ref 7.31–7.41)
PLAT MORPH BLD: (no result)
PLATELET # BLD AUTO: 35 X10E3/UL (ref 140–360)
PLATELET # BLD EST: (no result) 10*3/UL
POTASSIUM SERPL-SCNC: 3.3 MMOL/L (ref 3.5–5.1)
RBC # BLD AUTO: 3.11 X10E6/UL (ref 3.6–5.1)
RBC MORPH BLD: NORMAL
SAO2 % BLDA: 89 % (ref 95–98)
SODIUM SERPL-SCNC: 141 MMOL/L (ref 136–145)

## 2018-08-02 RX ADMIN — Medication SCH ML: at 15:00

## 2018-08-02 RX ADMIN — SODIUM CHLORIDE SCH MG: 900 INJECTION INTRAVENOUS at 09:00

## 2018-08-02 RX ADMIN — POTASSIUM CHLORIDE, DEXTROSE MONOHYDRATE AND SODIUM CHLORIDE SCH MLS/HR: 150; 5; 450 INJECTION, SOLUTION INTRAVENOUS at 09:49

## 2018-08-02 RX ADMIN — Medication SCH ML: at 23:00

## 2018-08-02 RX ADMIN — SODIUM CHLORIDE SCH MG: 900 INJECTION INTRAVENOUS at 17:00

## 2018-08-02 RX ADMIN — Medication SCH ML: at 11:00

## 2018-08-02 RX ADMIN — PROPOFOL PRN MLS/HR: 10 INJECTION, EMULSION INTRAVENOUS at 14:25

## 2018-08-02 RX ADMIN — CITRIC ACID AND SODIUM CITRATE SCH ML: 640; 490 SOLUTION ORAL at 21:07

## 2018-08-02 RX ADMIN — Medication SCH ML: at 03:00

## 2018-08-02 RX ADMIN — NICOTINE SCH MG: 7 PATCH, EXTENDED RELEASE TRANSDERMAL at 09:00

## 2018-08-02 RX ADMIN — ACETAMINOPHEN PRN MG: 325 SOLUTION ORAL at 09:49

## 2018-08-02 RX ADMIN — SODIUM CHLORIDE SCH MLS/HR: 900 INJECTION INTRAVENOUS at 09:00

## 2018-08-02 RX ADMIN — TAZOBACTAM SODIUM AND PIPERACILLIN SODIUM SCH MLS/HR: 375; 3 INJECTION, SOLUTION INTRAVENOUS at 06:28

## 2018-08-02 RX ADMIN — SODIUM CHLORIDE SCH GM: 9 INJECTION, SOLUTION INTRAVENOUS at 21:08

## 2018-08-02 RX ADMIN — FILGRASTIM SCH MCG: 480 INJECTION, SOLUTION INTRAVENOUS; SUBCUTANEOUS at 09:00

## 2018-08-02 RX ADMIN — CITRIC ACID AND SODIUM CITRATE SCH ML: 640; 490 SOLUTION ORAL at 09:00

## 2018-08-02 RX ADMIN — TAZOBACTAM SODIUM AND PIPERACILLIN SODIUM SCH MLS/HR: 375; 3 INJECTION, SOLUTION INTRAVENOUS at 00:08

## 2018-08-02 RX ADMIN — Medication SCH ML: at 07:00

## 2018-08-02 RX ADMIN — POTASSIUM CHLORIDE PRN MEQ: 1.5 SOLUTION ORAL at 09:49

## 2018-08-02 RX ADMIN — VANCOMYCIN HYDROCHLORIDE SCH MLS/HR: 1 INJECTION, SOLUTION INTRAVENOUS at 06:28

## 2018-08-02 RX ADMIN — POTASSIUM CHLORIDE, DEXTROSE MONOHYDRATE AND SODIUM CHLORIDE SCH MLS/HR: 150; 5; 450 INJECTION, SOLUTION INTRAVENOUS at 20:00

## 2018-08-02 RX ADMIN — Medication SCH ML: at 19:00

## 2018-08-02 RX ADMIN — Medication SCH MLS/HR: at 22:17

## 2018-08-02 NOTE — DIAGNOSTIC IMAGING REPORT
CHEST SINGLE (PORTABLE), 8/2/2018 5:32 AM



Technique: CHEST SINGLE (PORTABLE)

Comparison: previous day

Clinical history: \S\FU PNEUMONIA



Findings:

See Impression. Bilateral breast implants.



Impression:

1. Lines/Tubes: Right central venous catheter projects at least to the

cavoatrial junction. Stable ET tube 3.4 cm above the jennifer, subdiaphragmatic

NG-tube over the stomach.

2. Normal visualized cardiomediastinal silhouette.

3. Right basilar consolidation in keeping with history of pneumonia. Likely

underlying right pleural effusion. 



Signed by: Dr Faith Schwartz MD on 8/2/2018 6:02 AM

## 2018-08-03 VITALS — SYSTOLIC BLOOD PRESSURE: 119 MMHG | DIASTOLIC BLOOD PRESSURE: 74 MMHG

## 2018-08-03 VITALS — DIASTOLIC BLOOD PRESSURE: 71 MMHG | SYSTOLIC BLOOD PRESSURE: 117 MMHG

## 2018-08-03 VITALS — DIASTOLIC BLOOD PRESSURE: 68 MMHG | SYSTOLIC BLOOD PRESSURE: 111 MMHG

## 2018-08-03 VITALS — DIASTOLIC BLOOD PRESSURE: 69 MMHG | SYSTOLIC BLOOD PRESSURE: 118 MMHG

## 2018-08-03 VITALS — DIASTOLIC BLOOD PRESSURE: 76 MMHG | SYSTOLIC BLOOD PRESSURE: 116 MMHG

## 2018-08-03 VITALS — DIASTOLIC BLOOD PRESSURE: 70 MMHG | SYSTOLIC BLOOD PRESSURE: 114 MMHG

## 2018-08-03 VITALS — DIASTOLIC BLOOD PRESSURE: 74 MMHG | SYSTOLIC BLOOD PRESSURE: 117 MMHG

## 2018-08-03 VITALS — DIASTOLIC BLOOD PRESSURE: 68 MMHG | SYSTOLIC BLOOD PRESSURE: 118 MMHG

## 2018-08-03 VITALS — SYSTOLIC BLOOD PRESSURE: 117 MMHG | DIASTOLIC BLOOD PRESSURE: 72 MMHG

## 2018-08-03 VITALS — SYSTOLIC BLOOD PRESSURE: 98 MMHG | DIASTOLIC BLOOD PRESSURE: 70 MMHG

## 2018-08-03 VITALS — SYSTOLIC BLOOD PRESSURE: 105 MMHG | DIASTOLIC BLOOD PRESSURE: 73 MMHG

## 2018-08-03 VITALS — SYSTOLIC BLOOD PRESSURE: 110 MMHG | DIASTOLIC BLOOD PRESSURE: 70 MMHG

## 2018-08-03 VITALS — SYSTOLIC BLOOD PRESSURE: 118 MMHG | DIASTOLIC BLOOD PRESSURE: 69 MMHG

## 2018-08-03 VITALS — SYSTOLIC BLOOD PRESSURE: 114 MMHG | DIASTOLIC BLOOD PRESSURE: 70 MMHG

## 2018-08-03 VITALS — DIASTOLIC BLOOD PRESSURE: 70 MMHG | SYSTOLIC BLOOD PRESSURE: 118 MMHG

## 2018-08-03 VITALS — DIASTOLIC BLOOD PRESSURE: 74 MMHG | SYSTOLIC BLOOD PRESSURE: 118 MMHG

## 2018-08-03 VITALS — DIASTOLIC BLOOD PRESSURE: 71 MMHG | SYSTOLIC BLOOD PRESSURE: 123 MMHG

## 2018-08-03 VITALS — DIASTOLIC BLOOD PRESSURE: 72 MMHG | SYSTOLIC BLOOD PRESSURE: 113 MMHG

## 2018-08-03 VITALS — SYSTOLIC BLOOD PRESSURE: 125 MMHG | DIASTOLIC BLOOD PRESSURE: 70 MMHG

## 2018-08-03 VITALS — SYSTOLIC BLOOD PRESSURE: 119 MMHG | DIASTOLIC BLOOD PRESSURE: 69 MMHG

## 2018-08-03 VITALS — DIASTOLIC BLOOD PRESSURE: 68 MMHG | SYSTOLIC BLOOD PRESSURE: 112 MMHG

## 2018-08-03 VITALS — DIASTOLIC BLOOD PRESSURE: 74 MMHG | SYSTOLIC BLOOD PRESSURE: 119 MMHG

## 2018-08-03 VITALS — DIASTOLIC BLOOD PRESSURE: 71 MMHG | SYSTOLIC BLOOD PRESSURE: 118 MMHG

## 2018-08-03 VITALS — SYSTOLIC BLOOD PRESSURE: 124 MMHG | DIASTOLIC BLOOD PRESSURE: 67 MMHG

## 2018-08-03 VITALS — SYSTOLIC BLOOD PRESSURE: 114 MMHG | DIASTOLIC BLOOD PRESSURE: 83 MMHG

## 2018-08-03 VITALS — SYSTOLIC BLOOD PRESSURE: 122 MMHG | DIASTOLIC BLOOD PRESSURE: 66 MMHG

## 2018-08-03 VITALS — SYSTOLIC BLOOD PRESSURE: 113 MMHG | DIASTOLIC BLOOD PRESSURE: 67 MMHG

## 2018-08-03 VITALS — SYSTOLIC BLOOD PRESSURE: 115 MMHG | DIASTOLIC BLOOD PRESSURE: 72 MMHG

## 2018-08-03 VITALS — SYSTOLIC BLOOD PRESSURE: 120 MMHG | DIASTOLIC BLOOD PRESSURE: 69 MMHG

## 2018-08-03 VITALS — DIASTOLIC BLOOD PRESSURE: 75 MMHG | SYSTOLIC BLOOD PRESSURE: 127 MMHG

## 2018-08-03 VITALS — DIASTOLIC BLOOD PRESSURE: 69 MMHG | SYSTOLIC BLOOD PRESSURE: 109 MMHG

## 2018-08-03 VITALS — SYSTOLIC BLOOD PRESSURE: 122 MMHG | DIASTOLIC BLOOD PRESSURE: 74 MMHG

## 2018-08-03 VITALS — SYSTOLIC BLOOD PRESSURE: 124 MMHG | DIASTOLIC BLOOD PRESSURE: 70 MMHG

## 2018-08-03 VITALS — DIASTOLIC BLOOD PRESSURE: 80 MMHG | SYSTOLIC BLOOD PRESSURE: 116 MMHG

## 2018-08-03 VITALS — DIASTOLIC BLOOD PRESSURE: 71 MMHG | SYSTOLIC BLOOD PRESSURE: 121 MMHG

## 2018-08-03 VITALS — SYSTOLIC BLOOD PRESSURE: 118 MMHG | DIASTOLIC BLOOD PRESSURE: 71 MMHG

## 2018-08-03 VITALS — DIASTOLIC BLOOD PRESSURE: 72 MMHG | SYSTOLIC BLOOD PRESSURE: 109 MMHG

## 2018-08-03 VITALS — DIASTOLIC BLOOD PRESSURE: 76 MMHG | SYSTOLIC BLOOD PRESSURE: 130 MMHG

## 2018-08-03 VITALS — DIASTOLIC BLOOD PRESSURE: 73 MMHG | SYSTOLIC BLOOD PRESSURE: 119 MMHG

## 2018-08-03 VITALS — SYSTOLIC BLOOD PRESSURE: 109 MMHG | DIASTOLIC BLOOD PRESSURE: 68 MMHG

## 2018-08-03 VITALS — SYSTOLIC BLOOD PRESSURE: 121 MMHG | DIASTOLIC BLOOD PRESSURE: 66 MMHG

## 2018-08-03 VITALS — SYSTOLIC BLOOD PRESSURE: 119 MMHG | DIASTOLIC BLOOD PRESSURE: 72 MMHG

## 2018-08-03 LAB
ANION GAP SERPL CALC-SCNC: 12.3 MMOL/L (ref 8–16)
BASOPHILS # BLD AUTO: 0 10*3/UL (ref 0–0.1)
BASOPHILS NFR BLD AUTO: 0.1 % (ref 0–1)
BUN SERPL-MCNC: 19 MG/DL (ref 7–26)
BUN/CREAT SERPL: 24 (ref 6–25)
CALCIUM SERPL-MCNC: 7.8 MG/DL (ref 8.4–10.2)
CHLORIDE SERPL-SCNC: 114 MMOL/L (ref 98–107)
CO2 SERPL-SCNC: 17 MMOL/L (ref 22–29)
DEPRECATED APTT PLAS QN: 40.6 SECONDS (ref 23.8–35.5)
DEPRECATED INR PLAS: 1.15
DEPRECATED NEUTROPHILS # BLD AUTO: 12.2 10*3/UL (ref 2.1–6.9)
DOHLE BOD BLD QL SMEAR: (no result)
EGFRCR SERPLBLD CKD-EPI 2021: > 60 ML/MIN (ref 60–?)
EOSINOPHIL # BLD AUTO: 0.1 10*3/UL (ref 0–0.4)
EOSINOPHIL NFR BLD AUTO: 1 % (ref 0–6)
EOSINOPHIL NFR BLD MANUAL: 1 % (ref 0–7)
ERYTHROCYTE [DISTWIDTH] IN CORD BLOOD: 15.4 % (ref 11.7–14.4)
FIBRINOGEN PPP COAG.DERIVED-MCNC: 702 MG/DL (ref 204–462)
GLUCOSE SERPLBLD-MCNC: 135 MG/DL (ref 74–118)
HCT VFR BLD AUTO: 27.9 % (ref 34.2–44.1)
HGB BLD-MCNC: 10 G/DL (ref 12–16)
LYMPHOCYTES # BLD: 0.3 10*3/UL (ref 1–3.2)
LYMPHOCYTES NFR BLD AUTO: 2 % (ref 18–39.1)
LYMPHOCYTES NFR BLD MANUAL: 1 % (ref 19–48)
MCH RBC QN AUTO: 34 PG (ref 28–32)
MCHC RBC AUTO-ENTMCNC: 35.8 G/DL (ref 31–35)
MCV RBC AUTO: 94.9 FL (ref 81–99)
MONOCYTES # BLD AUTO: 0.5 10*3/UL (ref 0.2–0.8)
MONOCYTES NFR BLD AUTO: 3.7 % (ref 4.4–11.3)
MONOCYTES NFR BLD MANUAL: 2 % (ref 3.4–9)
NEUTS BAND NFR BLD MANUAL: 8 %
NEUTS SEG NFR BLD AUTO: 91.1 % (ref 38.7–80)
NEUTS SEG NFR BLD MANUAL: 88 % (ref 40–74)
PLAT MORPH BLD: NORMAL
PLATELET # BLD AUTO: 7 X10E3/UL (ref 140–360)
PLATELET # BLD EST: (no result) 10*3/UL
POTASSIUM SERPL-SCNC: 3.3 MMOL/L (ref 3.5–5.1)
PROTHROMBIN TIME: 13.8 SECONDS (ref 11.9–14.5)
RBC # BLD AUTO: 2.94 X10E6/UL (ref 3.6–5.1)
RBC MORPH BLD: NORMAL
SODIUM SERPL-SCNC: 140 MMOL/L (ref 136–145)

## 2018-08-03 PROCEDURE — 30233R1 TRANSFUSION OF NONAUTOLOGOUS PLATELETS INTO PERIPHERAL VEIN, PERCUTANEOUS APPROACH: ICD-10-PCS | Performed by: INTERNAL MEDICINE

## 2018-08-03 RX ADMIN — SODIUM CHLORIDE SCH MG: 900 INJECTION INTRAVENOUS at 09:18

## 2018-08-03 RX ADMIN — POTASSIUM CHLORIDE, DEXTROSE MONOHYDRATE AND SODIUM CHLORIDE SCH MLS/HR: 150; 5; 450 INJECTION, SOLUTION INTRAVENOUS at 05:35

## 2018-08-03 RX ADMIN — NICOTINE SCH MG: 7 PATCH, EXTENDED RELEASE TRANSDERMAL at 09:30

## 2018-08-03 RX ADMIN — POTASSIUM CHLORIDE, DEXTROSE MONOHYDRATE AND SODIUM CHLORIDE SCH MLS/HR: 150; 5; 450 INJECTION, SOLUTION INTRAVENOUS at 16:23

## 2018-08-03 RX ADMIN — Medication SCH ML: at 23:00

## 2018-08-03 RX ADMIN — Medication SCH ML: at 11:00

## 2018-08-03 RX ADMIN — Medication SCH ML: at 07:00

## 2018-08-03 RX ADMIN — SODIUM CHLORIDE SCH GM: 9 INJECTION, SOLUTION INTRAVENOUS at 22:27

## 2018-08-03 RX ADMIN — POTASSIUM CHLORIDE PRN MEQ: 1.5 SOLUTION ORAL at 06:35

## 2018-08-03 RX ADMIN — Medication SCH ML: at 03:00

## 2018-08-03 RX ADMIN — NICOTINE SCH MG: 7 PATCH, EXTENDED RELEASE TRANSDERMAL at 09:19

## 2018-08-03 RX ADMIN — SODIUM CHLORIDE SCH MG: 900 INJECTION INTRAVENOUS at 17:06

## 2018-08-03 RX ADMIN — SODIUM CHLORIDE SCH GM: 9 INJECTION, SOLUTION INTRAVENOUS at 11:06

## 2018-08-03 RX ADMIN — Medication SCH MLS/HR: at 17:00

## 2018-08-03 RX ADMIN — CITRIC ACID AND SODIUM CITRATE SCH ML: 640; 490 SOLUTION ORAL at 09:19

## 2018-08-03 RX ADMIN — PROPOFOL PRN MLS/HR: 10 INJECTION, EMULSION INTRAVENOUS at 19:25

## 2018-08-03 RX ADMIN — Medication SCH ML: at 15:00

## 2018-08-03 RX ADMIN — SODIUM CHLORIDE SCH MLS/HR: 900 INJECTION INTRAVENOUS at 11:05

## 2018-08-03 RX ADMIN — Medication SCH ML: at 19:00

## 2018-08-03 RX ADMIN — CITRIC ACID AND SODIUM CITRATE SCH ML: 640; 490 SOLUTION ORAL at 20:27

## 2018-08-03 NOTE — DIAGNOSTIC IMAGING REPORT
PROCEDURE:US CHEST (INCL MEDIASTINUM)

 

COMPARISON:US chest 8/1/2018.

 

INDICATIONS:pleural effusion

 

FINDINGS: 

Trace bilateral pleural effusions. 

 

CONCLUSION:

Trace bilateral pleural effusions.

 

 

 

Dictated by:  José Miguel Levy M.D. on 8/03/2018 at 18:47     

Electronically approved by:  José Miguel Levy M.D. on 8/03/2018 at 18:47

## 2018-08-03 NOTE — DIAGNOSTIC IMAGING REPORT
PROCEDURE:ABDOMINAL ULTRASOUND

 

COMPARISON:None.

 

INDICATIONS:  Liver/spleen

 

TECHNIQUE: Transverse and longitudinal images of the upper abdomen were 

obtained. 

 

FINDINGS:

 

Liver: 

Size: 15.6 cm in the right midclavicular line, normal

Appearance: Increased echogenicity, smooth contour

Mass: No focal masses

 

Spleen: 

Size: 8.8 cm in length, normal

Echogenicity: Normal

Mass: No focal masses

 

Gallbladder: 

Stones/Sludge: None

Appearance: No wall thickening, pericholecystic fluid or hydrops.

Sonographic Eastman's Sign: Negative

 

Bile Ducts: 

Intrahepatic Ducts: No dilatation

Extrahepatic Ducts: Common bile duct measures 0.3 cm, no dilatation

 

Pancreas: 

Visualized portions of the neck and proximal body are normal. 

 

Right Kidney:

Size: 11.1 cm

Echogenicity: Normal

Collecting System: No hydronephrosis

Stone: None

Cyst/Mass: None

 

Left Kidney:

Size:   12 cm

Echogenicity: Normal

Collecting System: No hydronephrosis

Stone: None

Cyst/Mass: None

 

 

Vessels:

Aorta: Visualized portions are normal

Inferior Vena Cava: Visualized portions and normal

Main Portal Vein: 1 cm, normal size with hepatopetal flow. 

 

Free Fluid: 

Trace perihepatic and pericholecystic free fluid. Partially visualized 

trace right pleural effusion. 

 

IMPRESSION:

1. Increased liver echogenicity suggestive of hepatocellular 

dysfunction such as hepatic steatosis.

2. Trace ascites and right pleural effusion. 

 

 

 

Dictated by:  José Miguel Levy M.D. on 8/03/2018 at 18:34     

Electronically approved by:  José Miguel Levy M.D. on 8/03/2018 at 18:34

## 2018-08-03 NOTE — DIAGNOSTIC IMAGING REPORT
CHEST SINGLE (PORTABLE), 8/3/2018 7:00 AM



Technique: CHEST SINGLE (PORTABLE)

Comparison: previous day

Clinical history: Pneumonia



Findings:

See Impression. Bilateral breast implants.



Impression:

1. Lines/Tubes: Right central venous catheter projects to the cavoatrial

junction. Stable ET tube 3 cm above the jennifer, subdiaphragmatic NG-tube over

the stomach.

2. Normal visualized cardiomediastinal silhouette.

3. Increasing bilateral pulmonary opacity, likely a combination of pneumonia,

atelectasis and/or edema, with layering pleural fluid.



Signed by: Dr Faith Schwartz MD on 8/3/2018 6:21 AM

## 2018-08-04 VITALS — SYSTOLIC BLOOD PRESSURE: 122 MMHG | DIASTOLIC BLOOD PRESSURE: 74 MMHG

## 2018-08-04 VITALS — SYSTOLIC BLOOD PRESSURE: 135 MMHG | DIASTOLIC BLOOD PRESSURE: 83 MMHG

## 2018-08-04 VITALS — SYSTOLIC BLOOD PRESSURE: 124 MMHG | DIASTOLIC BLOOD PRESSURE: 69 MMHG

## 2018-08-04 VITALS — DIASTOLIC BLOOD PRESSURE: 73 MMHG | SYSTOLIC BLOOD PRESSURE: 109 MMHG

## 2018-08-04 VITALS — DIASTOLIC BLOOD PRESSURE: 79 MMHG | SYSTOLIC BLOOD PRESSURE: 139 MMHG

## 2018-08-04 VITALS — SYSTOLIC BLOOD PRESSURE: 125 MMHG | DIASTOLIC BLOOD PRESSURE: 72 MMHG

## 2018-08-04 VITALS — DIASTOLIC BLOOD PRESSURE: 86 MMHG | SYSTOLIC BLOOD PRESSURE: 162 MMHG

## 2018-08-04 VITALS — DIASTOLIC BLOOD PRESSURE: 78 MMHG | SYSTOLIC BLOOD PRESSURE: 125 MMHG

## 2018-08-04 VITALS — DIASTOLIC BLOOD PRESSURE: 69 MMHG | SYSTOLIC BLOOD PRESSURE: 108 MMHG

## 2018-08-04 VITALS — SYSTOLIC BLOOD PRESSURE: 119 MMHG | DIASTOLIC BLOOD PRESSURE: 71 MMHG

## 2018-08-04 VITALS — DIASTOLIC BLOOD PRESSURE: 71 MMHG | SYSTOLIC BLOOD PRESSURE: 107 MMHG

## 2018-08-04 VITALS — DIASTOLIC BLOOD PRESSURE: 85 MMHG | SYSTOLIC BLOOD PRESSURE: 132 MMHG

## 2018-08-04 VITALS — SYSTOLIC BLOOD PRESSURE: 122 MMHG | DIASTOLIC BLOOD PRESSURE: 80 MMHG

## 2018-08-04 VITALS — SYSTOLIC BLOOD PRESSURE: 109 MMHG | DIASTOLIC BLOOD PRESSURE: 74 MMHG

## 2018-08-04 VITALS — SYSTOLIC BLOOD PRESSURE: 119 MMHG | DIASTOLIC BLOOD PRESSURE: 66 MMHG

## 2018-08-04 VITALS — SYSTOLIC BLOOD PRESSURE: 114 MMHG | DIASTOLIC BLOOD PRESSURE: 72 MMHG

## 2018-08-04 VITALS — SYSTOLIC BLOOD PRESSURE: 141 MMHG | DIASTOLIC BLOOD PRESSURE: 79 MMHG

## 2018-08-04 VITALS — SYSTOLIC BLOOD PRESSURE: 112 MMHG | DIASTOLIC BLOOD PRESSURE: 72 MMHG

## 2018-08-04 VITALS — SYSTOLIC BLOOD PRESSURE: 121 MMHG | DIASTOLIC BLOOD PRESSURE: 72 MMHG

## 2018-08-04 VITALS — DIASTOLIC BLOOD PRESSURE: 77 MMHG | SYSTOLIC BLOOD PRESSURE: 131 MMHG

## 2018-08-04 VITALS — SYSTOLIC BLOOD PRESSURE: 102 MMHG | DIASTOLIC BLOOD PRESSURE: 64 MMHG

## 2018-08-04 VITALS — SYSTOLIC BLOOD PRESSURE: 133 MMHG | DIASTOLIC BLOOD PRESSURE: 75 MMHG

## 2018-08-04 VITALS — DIASTOLIC BLOOD PRESSURE: 74 MMHG | SYSTOLIC BLOOD PRESSURE: 124 MMHG

## 2018-08-04 VITALS — SYSTOLIC BLOOD PRESSURE: 95 MMHG | DIASTOLIC BLOOD PRESSURE: 66 MMHG

## 2018-08-04 VITALS — DIASTOLIC BLOOD PRESSURE: 67 MMHG | SYSTOLIC BLOOD PRESSURE: 110 MMHG

## 2018-08-04 VITALS — SYSTOLIC BLOOD PRESSURE: 118 MMHG | DIASTOLIC BLOOD PRESSURE: 70 MMHG

## 2018-08-04 VITALS — DIASTOLIC BLOOD PRESSURE: 69 MMHG | SYSTOLIC BLOOD PRESSURE: 119 MMHG

## 2018-08-04 VITALS — SYSTOLIC BLOOD PRESSURE: 121 MMHG | DIASTOLIC BLOOD PRESSURE: 73 MMHG

## 2018-08-04 VITALS — DIASTOLIC BLOOD PRESSURE: 72 MMHG | SYSTOLIC BLOOD PRESSURE: 106 MMHG

## 2018-08-04 VITALS — DIASTOLIC BLOOD PRESSURE: 82 MMHG | SYSTOLIC BLOOD PRESSURE: 138 MMHG

## 2018-08-04 VITALS — DIASTOLIC BLOOD PRESSURE: 72 MMHG | SYSTOLIC BLOOD PRESSURE: 114 MMHG

## 2018-08-04 VITALS — DIASTOLIC BLOOD PRESSURE: 74 MMHG | SYSTOLIC BLOOD PRESSURE: 120 MMHG

## 2018-08-04 VITALS — DIASTOLIC BLOOD PRESSURE: 78 MMHG | SYSTOLIC BLOOD PRESSURE: 130 MMHG

## 2018-08-04 VITALS — SYSTOLIC BLOOD PRESSURE: 116 MMHG | DIASTOLIC BLOOD PRESSURE: 70 MMHG

## 2018-08-04 VITALS — DIASTOLIC BLOOD PRESSURE: 78 MMHG | SYSTOLIC BLOOD PRESSURE: 126 MMHG

## 2018-08-04 VITALS — SYSTOLIC BLOOD PRESSURE: 127 MMHG | DIASTOLIC BLOOD PRESSURE: 78 MMHG

## 2018-08-04 VITALS — DIASTOLIC BLOOD PRESSURE: 85 MMHG | SYSTOLIC BLOOD PRESSURE: 140 MMHG

## 2018-08-04 VITALS — DIASTOLIC BLOOD PRESSURE: 76 MMHG | SYSTOLIC BLOOD PRESSURE: 122 MMHG

## 2018-08-04 VITALS — SYSTOLIC BLOOD PRESSURE: 106 MMHG | DIASTOLIC BLOOD PRESSURE: 65 MMHG

## 2018-08-04 VITALS — DIASTOLIC BLOOD PRESSURE: 74 MMHG | SYSTOLIC BLOOD PRESSURE: 115 MMHG

## 2018-08-04 VITALS — DIASTOLIC BLOOD PRESSURE: 75 MMHG | SYSTOLIC BLOOD PRESSURE: 125 MMHG

## 2018-08-04 VITALS — DIASTOLIC BLOOD PRESSURE: 87 MMHG | SYSTOLIC BLOOD PRESSURE: 147 MMHG

## 2018-08-04 VITALS — DIASTOLIC BLOOD PRESSURE: 73 MMHG | SYSTOLIC BLOOD PRESSURE: 120 MMHG

## 2018-08-04 VITALS — DIASTOLIC BLOOD PRESSURE: 65 MMHG | SYSTOLIC BLOOD PRESSURE: 108 MMHG

## 2018-08-04 VITALS — DIASTOLIC BLOOD PRESSURE: 71 MMHG | SYSTOLIC BLOOD PRESSURE: 120 MMHG

## 2018-08-04 VITALS — SYSTOLIC BLOOD PRESSURE: 106 MMHG | DIASTOLIC BLOOD PRESSURE: 76 MMHG

## 2018-08-04 VITALS — SYSTOLIC BLOOD PRESSURE: 135 MMHG | DIASTOLIC BLOOD PRESSURE: 89 MMHG

## 2018-08-04 VITALS — DIASTOLIC BLOOD PRESSURE: 76 MMHG | SYSTOLIC BLOOD PRESSURE: 129 MMHG

## 2018-08-04 VITALS — DIASTOLIC BLOOD PRESSURE: 64 MMHG | SYSTOLIC BLOOD PRESSURE: 94 MMHG

## 2018-08-04 VITALS — SYSTOLIC BLOOD PRESSURE: 136 MMHG | DIASTOLIC BLOOD PRESSURE: 75 MMHG

## 2018-08-04 VITALS — SYSTOLIC BLOOD PRESSURE: 127 MMHG | DIASTOLIC BLOOD PRESSURE: 86 MMHG

## 2018-08-04 VITALS — SYSTOLIC BLOOD PRESSURE: 121 MMHG | DIASTOLIC BLOOD PRESSURE: 77 MMHG

## 2018-08-04 VITALS — DIASTOLIC BLOOD PRESSURE: 69 MMHG | SYSTOLIC BLOOD PRESSURE: 116 MMHG

## 2018-08-04 VITALS — DIASTOLIC BLOOD PRESSURE: 75 MMHG | SYSTOLIC BLOOD PRESSURE: 110 MMHG

## 2018-08-04 VITALS — DIASTOLIC BLOOD PRESSURE: 79 MMHG | SYSTOLIC BLOOD PRESSURE: 104 MMHG

## 2018-08-04 VITALS — SYSTOLIC BLOOD PRESSURE: 119 MMHG | DIASTOLIC BLOOD PRESSURE: 76 MMHG

## 2018-08-04 VITALS — DIASTOLIC BLOOD PRESSURE: 84 MMHG | SYSTOLIC BLOOD PRESSURE: 149 MMHG

## 2018-08-04 VITALS — SYSTOLIC BLOOD PRESSURE: 155 MMHG | DIASTOLIC BLOOD PRESSURE: 87 MMHG

## 2018-08-04 VITALS — SYSTOLIC BLOOD PRESSURE: 129 MMHG | DIASTOLIC BLOOD PRESSURE: 81 MMHG

## 2018-08-04 VITALS — DIASTOLIC BLOOD PRESSURE: 76 MMHG | SYSTOLIC BLOOD PRESSURE: 130 MMHG

## 2018-08-04 VITALS — DIASTOLIC BLOOD PRESSURE: 86 MMHG | SYSTOLIC BLOOD PRESSURE: 134 MMHG

## 2018-08-04 VITALS — DIASTOLIC BLOOD PRESSURE: 68 MMHG | SYSTOLIC BLOOD PRESSURE: 107 MMHG

## 2018-08-04 VITALS — DIASTOLIC BLOOD PRESSURE: 74 MMHG | SYSTOLIC BLOOD PRESSURE: 123 MMHG

## 2018-08-04 VITALS — DIASTOLIC BLOOD PRESSURE: 72 MMHG | SYSTOLIC BLOOD PRESSURE: 132 MMHG

## 2018-08-04 VITALS — DIASTOLIC BLOOD PRESSURE: 68 MMHG | SYSTOLIC BLOOD PRESSURE: 112 MMHG

## 2018-08-04 VITALS — DIASTOLIC BLOOD PRESSURE: 73 MMHG | SYSTOLIC BLOOD PRESSURE: 118 MMHG

## 2018-08-04 VITALS — SYSTOLIC BLOOD PRESSURE: 106 MMHG | DIASTOLIC BLOOD PRESSURE: 70 MMHG

## 2018-08-04 VITALS — DIASTOLIC BLOOD PRESSURE: 70 MMHG | SYSTOLIC BLOOD PRESSURE: 106 MMHG

## 2018-08-04 VITALS — SYSTOLIC BLOOD PRESSURE: 132 MMHG | DIASTOLIC BLOOD PRESSURE: 78 MMHG

## 2018-08-04 VITALS — SYSTOLIC BLOOD PRESSURE: 105 MMHG | DIASTOLIC BLOOD PRESSURE: 68 MMHG

## 2018-08-04 VITALS — DIASTOLIC BLOOD PRESSURE: 66 MMHG | SYSTOLIC BLOOD PRESSURE: 104 MMHG

## 2018-08-04 VITALS — DIASTOLIC BLOOD PRESSURE: 69 MMHG | SYSTOLIC BLOOD PRESSURE: 114 MMHG

## 2018-08-04 VITALS — SYSTOLIC BLOOD PRESSURE: 135 MMHG | DIASTOLIC BLOOD PRESSURE: 78 MMHG

## 2018-08-04 VITALS — SYSTOLIC BLOOD PRESSURE: 126 MMHG | DIASTOLIC BLOOD PRESSURE: 74 MMHG

## 2018-08-04 VITALS — DIASTOLIC BLOOD PRESSURE: 74 MMHG | SYSTOLIC BLOOD PRESSURE: 122 MMHG

## 2018-08-04 VITALS — DIASTOLIC BLOOD PRESSURE: 70 MMHG | SYSTOLIC BLOOD PRESSURE: 104 MMHG

## 2018-08-04 VITALS — DIASTOLIC BLOOD PRESSURE: 77 MMHG | SYSTOLIC BLOOD PRESSURE: 124 MMHG

## 2018-08-04 VITALS — DIASTOLIC BLOOD PRESSURE: 76 MMHG | SYSTOLIC BLOOD PRESSURE: 127 MMHG

## 2018-08-04 VITALS — SYSTOLIC BLOOD PRESSURE: 139 MMHG | DIASTOLIC BLOOD PRESSURE: 80 MMHG

## 2018-08-04 VITALS — DIASTOLIC BLOOD PRESSURE: 74 MMHG | SYSTOLIC BLOOD PRESSURE: 119 MMHG

## 2018-08-04 LAB
ANION GAP SERPL CALC-SCNC: 12.5 MMOL/L (ref 8–16)
BASE EXCESS BLDA CALC-SCNC: -3 MMOL/L (ref -2–3)
BASOPHILS # BLD AUTO: 0 10*3/UL (ref 0–0.1)
BASOPHILS NFR BLD AUTO: 0.1 % (ref 0–1)
BUN SERPL-MCNC: 20 MG/DL (ref 7–26)
BUN/CREAT SERPL: 26 (ref 6–25)
CALCIUM SERPL-MCNC: 8.1 MG/DL (ref 8.4–10.2)
CHLORIDE SERPL-SCNC: 110 MMOL/L (ref 98–107)
CO2 SERPL-SCNC: 21 MMOL/L (ref 22–29)
DEPRECATED APTT PLAS QN: 34.6 SECONDS (ref 23.8–35.5)
DEPRECATED INR PLAS: 1.1
DEPRECATED NEUTROPHILS # BLD AUTO: 11.8 10*3/UL (ref 2.1–6.9)
EGFRCR SERPLBLD CKD-EPI 2021: > 60 ML/MIN (ref 60–?)
EOSINOPHIL # BLD AUTO: 0.1 10*3/UL (ref 0–0.4)
EOSINOPHIL NFR BLD AUTO: 0.6 % (ref 0–6)
ERYTHROCYTE [DISTWIDTH] IN CORD BLOOD: 16.2 % (ref 11.7–14.4)
GLUCOSE SERPLBLD-MCNC: 140 MG/DL (ref 74–118)
HCO3 BLDA-SCNC: 22 MMOL/L (ref 23–28)
HCT VFR BLD AUTO: 25.4 % (ref 34.2–44.1)
HGB BLD-MCNC: 9 G/DL (ref 12–16)
LYMPHOCYTES # BLD: 0.4 10*3/UL (ref 1–3.2)
LYMPHOCYTES NFR BLD AUTO: 2.9 % (ref 18–39.1)
LYMPHOCYTES NFR BLD MANUAL: 2 % (ref 19–48)
MCH RBC QN AUTO: 33.6 PG (ref 28–32)
MCHC RBC AUTO-ENTMCNC: 35.4 G/DL (ref 31–35)
MCV RBC AUTO: 94.8 FL (ref 81–99)
MONOCYTES # BLD AUTO: 0.5 10*3/UL (ref 0.2–0.8)
MONOCYTES NFR BLD AUTO: 4.1 % (ref 4.4–11.3)
MONOCYTES NFR BLD MANUAL: 3 % (ref 3.4–9)
NEUTS BAND NFR BLD MANUAL: 10 %
NEUTS SEG NFR BLD AUTO: 90.9 % (ref 38.7–80)
NEUTS SEG NFR BLD MANUAL: 85 % (ref 40–74)
PCO2 BLDA: 36 MMHG (ref 41–51)
PCO2 BLDA: 66 MMHG (ref 80–105)
PH BLDA: 7.38 [PH] (ref 7.31–7.41)
PLAT MORPH BLD: NORMAL
PLATELET # BLD AUTO: 31 X10E3/UL (ref 140–360)
PLATELET # BLD EST: (no result) 10*3/UL
POTASSIUM SERPL-SCNC: 3.5 MMOL/L (ref 3.5–5.1)
PROTHROMBIN TIME: 13.4 SECONDS (ref 11.9–14.5)
RBC # BLD AUTO: 2.68 X10E6/UL (ref 3.6–5.1)
RBC MORPH BLD: NORMAL
SAO2 % BLDA: 93 % (ref 95–98)
SODIUM SERPL-SCNC: 140 MMOL/L (ref 136–145)

## 2018-08-04 RX ADMIN — SODIUM CHLORIDE SCH MG: 900 INJECTION INTRAVENOUS at 18:51

## 2018-08-04 RX ADMIN — PROPOFOL PRN MLS/HR: 10 INJECTION, EMULSION INTRAVENOUS at 12:27

## 2018-08-04 RX ADMIN — Medication SCH ML: at 03:00

## 2018-08-04 RX ADMIN — CITRIC ACID AND SODIUM CITRATE SCH ML: 640; 490 SOLUTION ORAL at 09:59

## 2018-08-04 RX ADMIN — CITRIC ACID AND SODIUM CITRATE SCH ML: 640; 490 SOLUTION ORAL at 21:50

## 2018-08-04 RX ADMIN — POTASSIUM CHLORIDE, DEXTROSE MONOHYDRATE AND SODIUM CHLORIDE SCH MLS/HR: 150; 5; 450 INJECTION, SOLUTION INTRAVENOUS at 12:55

## 2018-08-04 RX ADMIN — Medication SCH MLS/HR: at 17:00

## 2018-08-04 RX ADMIN — SODIUM BICARBONATE SCH MG: 650 TABLET ORAL at 18:51

## 2018-08-04 RX ADMIN — SODIUM CHLORIDE SCH GM: 9 INJECTION, SOLUTION INTRAVENOUS at 21:50

## 2018-08-04 RX ADMIN — SODIUM CHLORIDE SCH MLS/HR: 900 INJECTION INTRAVENOUS at 09:59

## 2018-08-04 RX ADMIN — POTASSIUM CHLORIDE, DEXTROSE MONOHYDRATE AND SODIUM CHLORIDE SCH MLS/HR: 150; 5; 450 INJECTION, SOLUTION INTRAVENOUS at 02:33

## 2018-08-04 RX ADMIN — Medication SCH ML: at 14:47

## 2018-08-04 RX ADMIN — SODIUM CHLORIDE SCH GM: 9 INJECTION, SOLUTION INTRAVENOUS at 09:59

## 2018-08-04 RX ADMIN — Medication SCH ML: at 19:00

## 2018-08-04 RX ADMIN — Medication SCH ML: at 10:43

## 2018-08-04 RX ADMIN — POTASSIUM CHLORIDE, DEXTROSE MONOHYDRATE AND SODIUM CHLORIDE SCH MLS/HR: 150; 5; 450 INJECTION, SOLUTION INTRAVENOUS at 22:28

## 2018-08-04 RX ADMIN — SODIUM CHLORIDE SCH MG: 900 INJECTION INTRAVENOUS at 09:59

## 2018-08-04 RX ADMIN — Medication SCH ML: at 07:00

## 2018-08-04 RX ADMIN — Medication SCH ML: at 23:00

## 2018-08-04 RX ADMIN — SODIUM BICARBONATE SCH MG: 650 TABLET ORAL at 21:50

## 2018-08-04 NOTE — DIAGNOSTIC IMAGING REPORT
CHEST SINGLE (PORTABLE), 8/4/2018 7:00 AM



Technique: CHEST SINGLE (PORTABLE)

Comparison: previous day

Clinical history: Pneumonia



Findings:

See Impression. Bilateral breast implants.



Impression:

1. Lines/Tubes: Right central venous catheter projects to the cavoatrial

junction. Stable ET tube 2.5 cm above the jennifer, subdiaphragmatic NG-tube over

the stomach.

2. Normal visualized cardiomediastinal silhouette.

3. Stable bilateral pulmonary opacity, possibly a combination of pneumonia,

atelectasis and/or edema, with pleural fluid.



Signed by: Dr Faith Schwartz MD on 8/4/2018 6:45 AM

## 2018-08-05 VITALS — SYSTOLIC BLOOD PRESSURE: 144 MMHG | DIASTOLIC BLOOD PRESSURE: 84 MMHG

## 2018-08-05 VITALS — SYSTOLIC BLOOD PRESSURE: 145 MMHG | DIASTOLIC BLOOD PRESSURE: 87 MMHG

## 2018-08-05 VITALS — DIASTOLIC BLOOD PRESSURE: 71 MMHG | SYSTOLIC BLOOD PRESSURE: 119 MMHG

## 2018-08-05 VITALS — DIASTOLIC BLOOD PRESSURE: 79 MMHG | SYSTOLIC BLOOD PRESSURE: 138 MMHG

## 2018-08-05 VITALS — DIASTOLIC BLOOD PRESSURE: 69 MMHG | SYSTOLIC BLOOD PRESSURE: 112 MMHG

## 2018-08-05 VITALS — SYSTOLIC BLOOD PRESSURE: 133 MMHG | DIASTOLIC BLOOD PRESSURE: 79 MMHG

## 2018-08-05 VITALS — DIASTOLIC BLOOD PRESSURE: 81 MMHG | SYSTOLIC BLOOD PRESSURE: 150 MMHG

## 2018-08-05 VITALS — SYSTOLIC BLOOD PRESSURE: 137 MMHG | DIASTOLIC BLOOD PRESSURE: 82 MMHG

## 2018-08-05 VITALS — SYSTOLIC BLOOD PRESSURE: 144 MMHG | DIASTOLIC BLOOD PRESSURE: 81 MMHG

## 2018-08-05 VITALS — SYSTOLIC BLOOD PRESSURE: 139 MMHG | DIASTOLIC BLOOD PRESSURE: 76 MMHG

## 2018-08-05 VITALS — DIASTOLIC BLOOD PRESSURE: 63 MMHG | SYSTOLIC BLOOD PRESSURE: 111 MMHG

## 2018-08-05 VITALS — SYSTOLIC BLOOD PRESSURE: 141 MMHG | DIASTOLIC BLOOD PRESSURE: 84 MMHG

## 2018-08-05 VITALS — SYSTOLIC BLOOD PRESSURE: 111 MMHG | DIASTOLIC BLOOD PRESSURE: 65 MMHG

## 2018-08-05 VITALS — DIASTOLIC BLOOD PRESSURE: 67 MMHG | SYSTOLIC BLOOD PRESSURE: 110 MMHG

## 2018-08-05 VITALS — SYSTOLIC BLOOD PRESSURE: 99 MMHG | DIASTOLIC BLOOD PRESSURE: 59 MMHG

## 2018-08-05 VITALS — DIASTOLIC BLOOD PRESSURE: 56 MMHG | SYSTOLIC BLOOD PRESSURE: 97 MMHG

## 2018-08-05 VITALS — SYSTOLIC BLOOD PRESSURE: 145 MMHG | DIASTOLIC BLOOD PRESSURE: 80 MMHG

## 2018-08-05 VITALS — DIASTOLIC BLOOD PRESSURE: 79 MMHG | SYSTOLIC BLOOD PRESSURE: 148 MMHG

## 2018-08-05 VITALS — SYSTOLIC BLOOD PRESSURE: 150 MMHG | DIASTOLIC BLOOD PRESSURE: 81 MMHG

## 2018-08-05 VITALS — DIASTOLIC BLOOD PRESSURE: 80 MMHG | SYSTOLIC BLOOD PRESSURE: 133 MMHG

## 2018-08-05 VITALS — DIASTOLIC BLOOD PRESSURE: 66 MMHG | SYSTOLIC BLOOD PRESSURE: 109 MMHG

## 2018-08-05 VITALS — DIASTOLIC BLOOD PRESSURE: 65 MMHG | SYSTOLIC BLOOD PRESSURE: 117 MMHG

## 2018-08-05 VITALS — SYSTOLIC BLOOD PRESSURE: 123 MMHG | DIASTOLIC BLOOD PRESSURE: 69 MMHG

## 2018-08-05 VITALS — DIASTOLIC BLOOD PRESSURE: 79 MMHG | SYSTOLIC BLOOD PRESSURE: 139 MMHG

## 2018-08-05 VITALS — SYSTOLIC BLOOD PRESSURE: 147 MMHG | DIASTOLIC BLOOD PRESSURE: 80 MMHG

## 2018-08-05 VITALS — DIASTOLIC BLOOD PRESSURE: 71 MMHG | SYSTOLIC BLOOD PRESSURE: 126 MMHG

## 2018-08-05 VITALS — SYSTOLIC BLOOD PRESSURE: 105 MMHG | DIASTOLIC BLOOD PRESSURE: 60 MMHG

## 2018-08-05 VITALS — DIASTOLIC BLOOD PRESSURE: 77 MMHG | SYSTOLIC BLOOD PRESSURE: 139 MMHG

## 2018-08-05 VITALS — SYSTOLIC BLOOD PRESSURE: 126 MMHG | DIASTOLIC BLOOD PRESSURE: 88 MMHG

## 2018-08-05 VITALS — DIASTOLIC BLOOD PRESSURE: 58 MMHG | SYSTOLIC BLOOD PRESSURE: 100 MMHG

## 2018-08-05 VITALS — DIASTOLIC BLOOD PRESSURE: 81 MMHG | SYSTOLIC BLOOD PRESSURE: 145 MMHG

## 2018-08-05 VITALS — SYSTOLIC BLOOD PRESSURE: 110 MMHG | DIASTOLIC BLOOD PRESSURE: 65 MMHG

## 2018-08-05 VITALS — DIASTOLIC BLOOD PRESSURE: 100 MMHG | SYSTOLIC BLOOD PRESSURE: 154 MMHG

## 2018-08-05 VITALS — SYSTOLIC BLOOD PRESSURE: 113 MMHG | DIASTOLIC BLOOD PRESSURE: 62 MMHG

## 2018-08-05 VITALS — DIASTOLIC BLOOD PRESSURE: 61 MMHG | SYSTOLIC BLOOD PRESSURE: 106 MMHG

## 2018-08-05 VITALS — SYSTOLIC BLOOD PRESSURE: 114 MMHG | DIASTOLIC BLOOD PRESSURE: 65 MMHG

## 2018-08-05 VITALS — DIASTOLIC BLOOD PRESSURE: 84 MMHG | SYSTOLIC BLOOD PRESSURE: 144 MMHG

## 2018-08-05 VITALS — SYSTOLIC BLOOD PRESSURE: 87 MMHG | DIASTOLIC BLOOD PRESSURE: 72 MMHG

## 2018-08-05 VITALS — SYSTOLIC BLOOD PRESSURE: 141 MMHG | DIASTOLIC BLOOD PRESSURE: 77 MMHG

## 2018-08-05 VITALS — SYSTOLIC BLOOD PRESSURE: 135 MMHG | DIASTOLIC BLOOD PRESSURE: 77 MMHG

## 2018-08-05 VITALS — SYSTOLIC BLOOD PRESSURE: 103 MMHG | DIASTOLIC BLOOD PRESSURE: 63 MMHG

## 2018-08-05 VITALS — SYSTOLIC BLOOD PRESSURE: 114 MMHG | DIASTOLIC BLOOD PRESSURE: 71 MMHG

## 2018-08-05 VITALS — DIASTOLIC BLOOD PRESSURE: 83 MMHG | SYSTOLIC BLOOD PRESSURE: 135 MMHG

## 2018-08-05 VITALS — SYSTOLIC BLOOD PRESSURE: 137 MMHG | DIASTOLIC BLOOD PRESSURE: 79 MMHG

## 2018-08-05 VITALS — SYSTOLIC BLOOD PRESSURE: 158 MMHG | DIASTOLIC BLOOD PRESSURE: 110 MMHG

## 2018-08-05 VITALS — DIASTOLIC BLOOD PRESSURE: 60 MMHG | SYSTOLIC BLOOD PRESSURE: 96 MMHG

## 2018-08-05 VITALS — SYSTOLIC BLOOD PRESSURE: 153 MMHG | DIASTOLIC BLOOD PRESSURE: 82 MMHG

## 2018-08-05 VITALS — SYSTOLIC BLOOD PRESSURE: 142 MMHG | DIASTOLIC BLOOD PRESSURE: 95 MMHG

## 2018-08-05 LAB
ALBUMIN SERPL-MCNC: 1.8 G/DL (ref 3.5–5)
ALBUMIN/GLOB SERPL: 0.7 {RATIO} (ref 0.8–2)
ALP SERPL-CCNC: 109 IU/L (ref 40–150)
ALT SERPL-CCNC: 29 IU/L (ref 0–55)
ANION GAP SERPL CALC-SCNC: 12.4 MMOL/L (ref 8–16)
BASE EXCESS BLDA CALC-SCNC: 0 MMOL/L (ref -2–3)
BASOPHILS # BLD AUTO: 0 10*3/UL (ref 0–0.1)
BASOPHILS NFR BLD AUTO: 0.4 % (ref 0–1)
BUN SERPL-MCNC: 16 MG/DL (ref 7–26)
BUN/CREAT SERPL: 23 (ref 6–25)
CALCIUM SERPL-MCNC: 8.6 MG/DL (ref 8.4–10.2)
CHLORIDE SERPL-SCNC: 111 MMOL/L (ref 98–107)
CO2 SERPL-SCNC: 24 MMOL/L (ref 22–29)
DEPRECATED NEUTROPHILS # BLD AUTO: 10.1 10*3/UL (ref 2.1–6.9)
EGFRCR SERPLBLD CKD-EPI 2021: > 60 ML/MIN (ref 60–?)
EOSINOPHIL # BLD AUTO: 0.1 10*3/UL (ref 0–0.4)
EOSINOPHIL NFR BLD AUTO: 0.7 % (ref 0–6)
EOSINOPHIL NFR BLD MANUAL: 1 % (ref 0–7)
ERYTHROCYTE [DISTWIDTH] IN CORD BLOOD: 16.7 % (ref 11.7–14.4)
GLOBULIN PLAS-MCNC: 2.7 G/DL (ref 2.3–3.5)
GLUCOSE SERPLBLD-MCNC: 172 MG/DL (ref 74–118)
HCO3 BLDA-SCNC: 25 MMOL/L (ref 23–28)
HCT VFR BLD AUTO: 22.5 % (ref 34.2–44.1)
HGB BLD-MCNC: 7.8 G/DL (ref 12–16)
LYMPHOCYTES # BLD: 0.4 10*3/UL (ref 1–3.2)
LYMPHOCYTES NFR BLD AUTO: 3.7 % (ref 18–39.1)
LYMPHOCYTES NFR BLD MANUAL: 5 % (ref 19–48)
MCH RBC QN AUTO: 33.5 PG (ref 28–32)
MCHC RBC AUTO-ENTMCNC: 34.7 G/DL (ref 31–35)
MCV RBC AUTO: 96.6 FL (ref 81–99)
METAMYELOCYTES NFR BLD MANUAL: 1 % (ref 0–0)
MONOCYTES # BLD AUTO: 0.2 10*3/UL (ref 0.2–0.8)
MONOCYTES NFR BLD AUTO: 1.5 % (ref 4.4–11.3)
MONOCYTES NFR BLD MANUAL: 3 % (ref 3.4–9)
NEUTS BAND NFR BLD MANUAL: 38 %
NEUTS SEG NFR BLD AUTO: 91.9 % (ref 38.7–80)
NEUTS SEG NFR BLD MANUAL: 52 % (ref 40–74)
PCO2 BLDA: 37 MMHG (ref 41–51)
PCO2 BLDA: 66 MMHG (ref 80–105)
PH BLDA: 7.43 [PH] (ref 7.31–7.41)
PLAT MORPH BLD: NORMAL
PLATELET # BLD AUTO: 13 X10E3/UL (ref 140–360)
PLATELET # BLD EST: (no result) 10*3/UL
POTASSIUM SERPL-SCNC: 3.4 MMOL/L (ref 3.5–5.1)
RBC # BLD AUTO: 2.33 X10E6/UL (ref 3.6–5.1)
RBC MORPH BLD: NORMAL
SAO2 % BLDA: 93 % (ref 95–98)
SODIUM SERPL-SCNC: 144 MMOL/L (ref 136–145)

## 2018-08-05 RX ADMIN — TAZOBACTAM SODIUM AND PIPERACILLIN SODIUM SCH MLS/HR: 375; 3 INJECTION, SOLUTION INTRAVENOUS at 16:00

## 2018-08-05 RX ADMIN — FUROSEMIDE SCH MG: 10 INJECTION, SOLUTION INTRAMUSCULAR; INTRAVENOUS at 13:30

## 2018-08-05 RX ADMIN — Medication SCH ML: at 11:00

## 2018-08-05 RX ADMIN — VANCOMYCIN HYDROCHLORIDE SCH MLS/HR: 1 INJECTION, SOLUTION INTRAVENOUS at 15:30

## 2018-08-05 RX ADMIN — Medication SCH MLS/HR: at 17:00

## 2018-08-05 RX ADMIN — SODIUM CHLORIDE SCH GM: 9 INJECTION, SOLUTION INTRAVENOUS at 21:48

## 2018-08-05 RX ADMIN — Medication SCH ML: at 03:00

## 2018-08-05 RX ADMIN — Medication SCH ML: at 07:00

## 2018-08-05 RX ADMIN — CITRIC ACID AND SODIUM CITRATE SCH ML: 640; 490 SOLUTION ORAL at 20:50

## 2018-08-05 RX ADMIN — SODIUM CHLORIDE SCH MG: 900 INJECTION INTRAVENOUS at 19:22

## 2018-08-05 RX ADMIN — FUROSEMIDE SCH MG: 10 INJECTION, SOLUTION INTRAMUSCULAR; INTRAVENOUS at 20:49

## 2018-08-05 RX ADMIN — SODIUM CHLORIDE SCH MG: 900 INJECTION INTRAVENOUS at 08:49

## 2018-08-05 RX ADMIN — SODIUM CHLORIDE SCH MLS/HR: 900 INJECTION INTRAVENOUS at 09:20

## 2018-08-05 RX ADMIN — SODIUM CHLORIDE SCH GM: 9 INJECTION, SOLUTION INTRAVENOUS at 15:11

## 2018-08-05 RX ADMIN — Medication SCH ML: at 15:00

## 2018-08-05 RX ADMIN — Medication SCH ML: at 19:05

## 2018-08-05 RX ADMIN — TAZOBACTAM SODIUM AND PIPERACILLIN SODIUM SCH MLS/HR: 375; 3 INJECTION, SOLUTION INTRAVENOUS at 20:50

## 2018-08-05 RX ADMIN — POTASSIUM CHLORIDE, DEXTROSE MONOHYDRATE AND SODIUM CHLORIDE SCH MLS/HR: 150; 5; 450 INJECTION, SOLUTION INTRAVENOUS at 08:00

## 2018-08-05 RX ADMIN — CITRIC ACID AND SODIUM CITRATE SCH ML: 640; 490 SOLUTION ORAL at 08:49

## 2018-08-05 RX ADMIN — NICOTINE SCH MG: 7 PATCH, EXTENDED RELEASE TRANSDERMAL at 08:52

## 2018-08-05 RX ADMIN — Medication SCH ML: at 23:25

## 2018-08-05 NOTE — PROGRESS NOTE
DATE:  August 05, 2018 



Today Ms. Hicks's pulmonary status got worse.  I was contacted by her 

critical care doctor.  The patient is intubated and sedated, but apparently 

her vent setting needed more demand and there was concern that she probably 

aspirated. 



PHYSICAL EXAMINATION 

GENERAL:  She is intubated today. 

VITAL SIGNS:   Stable, currently afebrile.

HEENT:   Not icteric. Oral intubation. 

NECK:   Supple.

CHEST:   A few rhonchi. 

HEART:   S1 and S2, no murmur.

ABDOMEN:   Soft.  Bowel sounds present.  No tenderness. 

EXTREMITIES:   No edema.



IMPRESSION:  

1. Concerned about aspiration.  Will broaden her coverage to Zosyn and 

vancomycin.  

2. Sepsis on admission. 

3. Aspiration pneumonia. 

4. Bacteremia with Streptococcus.  

5. Alcoholism. 

6. Respiratory failure. 

7. Will follow.  

   





DD:  08/05/2018 16:06

DT:  08/05/2018 16:11

Job#:  [id number]

## 2018-08-05 NOTE — DIAGNOSTIC IMAGING REPORT
EXAMINATION:  CHEST SINGLE (PORTABLE)   



COMPARISON:  0628 hours



INDICATION: Tube placement

     

DISCUSSION:

Frontal view of the chest obtained at 0645 hours.



HEART AND MEDIASTINUM:  The cardiomediastinal silhouette is stable.

  

LINES:  Endotracheal tube terminates 3 to 4 cm above the jennifer. Right PICC

line terminates in the SVC. Enteric tube extends past the diaphragm.



LUNGS: Lung volumes are lower. Widespread alveolar airspace opacities are

redemonstrated without significant change.



PLEURA:  Right pleural effusion is stable. Left pleural effusion is suspected.

No pneumothorax



BONES AND SOFT TISSUES:  No focal osseous lesion. The soft tissues are stable

with the presence of bilateral breast implants.



IMPRESSION: 



1.  Support devices as described above.

2.  No change in alveolar infiltrates or right pleural effusion. No new

cardiopulmonary findings.



Signed by: Dr. Citlali Head MD on 8/5/2018 7:07 AM

## 2018-08-06 VITALS — SYSTOLIC BLOOD PRESSURE: 103 MMHG | DIASTOLIC BLOOD PRESSURE: 56 MMHG

## 2018-08-06 VITALS — SYSTOLIC BLOOD PRESSURE: 116 MMHG | DIASTOLIC BLOOD PRESSURE: 68 MMHG

## 2018-08-06 VITALS — SYSTOLIC BLOOD PRESSURE: 119 MMHG | DIASTOLIC BLOOD PRESSURE: 59 MMHG

## 2018-08-06 VITALS — DIASTOLIC BLOOD PRESSURE: 51 MMHG | SYSTOLIC BLOOD PRESSURE: 104 MMHG

## 2018-08-06 VITALS — DIASTOLIC BLOOD PRESSURE: 61 MMHG | SYSTOLIC BLOOD PRESSURE: 104 MMHG

## 2018-08-06 VITALS — DIASTOLIC BLOOD PRESSURE: 81 MMHG | SYSTOLIC BLOOD PRESSURE: 133 MMHG

## 2018-08-06 VITALS — SYSTOLIC BLOOD PRESSURE: 122 MMHG | DIASTOLIC BLOOD PRESSURE: 52 MMHG

## 2018-08-06 VITALS — DIASTOLIC BLOOD PRESSURE: 58 MMHG | SYSTOLIC BLOOD PRESSURE: 110 MMHG

## 2018-08-06 VITALS — DIASTOLIC BLOOD PRESSURE: 57 MMHG | SYSTOLIC BLOOD PRESSURE: 108 MMHG

## 2018-08-06 VITALS — SYSTOLIC BLOOD PRESSURE: 145 MMHG | DIASTOLIC BLOOD PRESSURE: 72 MMHG

## 2018-08-06 VITALS — SYSTOLIC BLOOD PRESSURE: 107 MMHG | DIASTOLIC BLOOD PRESSURE: 58 MMHG

## 2018-08-06 VITALS — DIASTOLIC BLOOD PRESSURE: 61 MMHG | SYSTOLIC BLOOD PRESSURE: 115 MMHG

## 2018-08-06 VITALS — SYSTOLIC BLOOD PRESSURE: 118 MMHG | DIASTOLIC BLOOD PRESSURE: 63 MMHG

## 2018-08-06 VITALS — DIASTOLIC BLOOD PRESSURE: 61 MMHG | SYSTOLIC BLOOD PRESSURE: 126 MMHG

## 2018-08-06 VITALS — SYSTOLIC BLOOD PRESSURE: 121 MMHG | DIASTOLIC BLOOD PRESSURE: 64 MMHG

## 2018-08-06 VITALS — DIASTOLIC BLOOD PRESSURE: 61 MMHG | SYSTOLIC BLOOD PRESSURE: 113 MMHG

## 2018-08-06 VITALS — SYSTOLIC BLOOD PRESSURE: 104 MMHG | DIASTOLIC BLOOD PRESSURE: 63 MMHG

## 2018-08-06 VITALS — SYSTOLIC BLOOD PRESSURE: 109 MMHG | DIASTOLIC BLOOD PRESSURE: 64 MMHG

## 2018-08-06 VITALS — DIASTOLIC BLOOD PRESSURE: 62 MMHG | SYSTOLIC BLOOD PRESSURE: 107 MMHG

## 2018-08-06 VITALS — DIASTOLIC BLOOD PRESSURE: 59 MMHG | SYSTOLIC BLOOD PRESSURE: 121 MMHG

## 2018-08-06 VITALS — SYSTOLIC BLOOD PRESSURE: 109 MMHG | DIASTOLIC BLOOD PRESSURE: 58 MMHG

## 2018-08-06 VITALS — DIASTOLIC BLOOD PRESSURE: 60 MMHG | SYSTOLIC BLOOD PRESSURE: 124 MMHG

## 2018-08-06 VITALS — DIASTOLIC BLOOD PRESSURE: 63 MMHG | SYSTOLIC BLOOD PRESSURE: 116 MMHG

## 2018-08-06 VITALS — DIASTOLIC BLOOD PRESSURE: 63 MMHG | SYSTOLIC BLOOD PRESSURE: 130 MMHG

## 2018-08-06 VITALS — DIASTOLIC BLOOD PRESSURE: 71 MMHG | SYSTOLIC BLOOD PRESSURE: 134 MMHG

## 2018-08-06 VITALS — DIASTOLIC BLOOD PRESSURE: 61 MMHG | SYSTOLIC BLOOD PRESSURE: 125 MMHG

## 2018-08-06 VITALS — SYSTOLIC BLOOD PRESSURE: 107 MMHG | DIASTOLIC BLOOD PRESSURE: 57 MMHG

## 2018-08-06 VITALS — SYSTOLIC BLOOD PRESSURE: 114 MMHG | DIASTOLIC BLOOD PRESSURE: 69 MMHG

## 2018-08-06 VITALS — DIASTOLIC BLOOD PRESSURE: 60 MMHG | SYSTOLIC BLOOD PRESSURE: 112 MMHG

## 2018-08-06 VITALS — SYSTOLIC BLOOD PRESSURE: 110 MMHG | DIASTOLIC BLOOD PRESSURE: 63 MMHG

## 2018-08-06 VITALS — DIASTOLIC BLOOD PRESSURE: 59 MMHG | SYSTOLIC BLOOD PRESSURE: 101 MMHG

## 2018-08-06 VITALS — SYSTOLIC BLOOD PRESSURE: 116 MMHG | DIASTOLIC BLOOD PRESSURE: 64 MMHG

## 2018-08-06 VITALS — DIASTOLIC BLOOD PRESSURE: 60 MMHG | SYSTOLIC BLOOD PRESSURE: 113 MMHG

## 2018-08-06 VITALS — SYSTOLIC BLOOD PRESSURE: 107 MMHG | DIASTOLIC BLOOD PRESSURE: 51 MMHG

## 2018-08-06 VITALS — SYSTOLIC BLOOD PRESSURE: 130 MMHG | DIASTOLIC BLOOD PRESSURE: 56 MMHG

## 2018-08-06 VITALS — DIASTOLIC BLOOD PRESSURE: 59 MMHG | SYSTOLIC BLOOD PRESSURE: 112 MMHG

## 2018-08-06 VITALS — SYSTOLIC BLOOD PRESSURE: 103 MMHG | DIASTOLIC BLOOD PRESSURE: 59 MMHG

## 2018-08-06 VITALS — DIASTOLIC BLOOD PRESSURE: 61 MMHG | SYSTOLIC BLOOD PRESSURE: 122 MMHG

## 2018-08-06 VITALS — DIASTOLIC BLOOD PRESSURE: 64 MMHG | SYSTOLIC BLOOD PRESSURE: 128 MMHG

## 2018-08-06 VITALS — SYSTOLIC BLOOD PRESSURE: 111 MMHG | DIASTOLIC BLOOD PRESSURE: 60 MMHG

## 2018-08-06 VITALS — DIASTOLIC BLOOD PRESSURE: 58 MMHG | SYSTOLIC BLOOD PRESSURE: 111 MMHG

## 2018-08-06 VITALS — DIASTOLIC BLOOD PRESSURE: 66 MMHG | SYSTOLIC BLOOD PRESSURE: 126 MMHG

## 2018-08-06 VITALS — SYSTOLIC BLOOD PRESSURE: 95 MMHG | DIASTOLIC BLOOD PRESSURE: 52 MMHG

## 2018-08-06 VITALS — DIASTOLIC BLOOD PRESSURE: 57 MMHG | SYSTOLIC BLOOD PRESSURE: 102 MMHG

## 2018-08-06 VITALS — SYSTOLIC BLOOD PRESSURE: 107 MMHG | DIASTOLIC BLOOD PRESSURE: 61 MMHG

## 2018-08-06 VITALS — SYSTOLIC BLOOD PRESSURE: 112 MMHG | DIASTOLIC BLOOD PRESSURE: 62 MMHG

## 2018-08-06 VITALS — SYSTOLIC BLOOD PRESSURE: 111 MMHG | DIASTOLIC BLOOD PRESSURE: 61 MMHG

## 2018-08-06 VITALS — SYSTOLIC BLOOD PRESSURE: 92 MMHG | DIASTOLIC BLOOD PRESSURE: 50 MMHG

## 2018-08-06 LAB
ALBUMIN SERPL-MCNC: 1.5 G/DL (ref 3.5–5)
ALBUMIN/GLOB SERPL: 0.5 {RATIO} (ref 0.8–2)
ALP SERPL-CCNC: 115 IU/L (ref 40–150)
ALT SERPL-CCNC: 20 IU/L (ref 0–55)
ANION GAP SERPL CALC-SCNC: 15 MMOL/L (ref 8–16)
ANION GAP SERPL CALC-SCNC: 15.4 MMOL/L (ref 8–16)
ANISOCYTOSIS BLD QL SMEAR: SLIGHT
BASOPHILS # BLD AUTO: 0 10*3/UL (ref 0–0.1)
BASOPHILS # BLD AUTO: 0.1 10*3/UL (ref 0–0.1)
BASOPHILS NFR BLD AUTO: 0.3 % (ref 0–1)
BASOPHILS NFR BLD AUTO: 0.7 % (ref 0–1)
BUN SERPL-MCNC: 24 MG/DL (ref 7–26)
BUN SERPL-MCNC: 29 MG/DL (ref 7–26)
BUN/CREAT SERPL: 32 (ref 6–25)
BUN/CREAT SERPL: 36 (ref 6–25)
CALCIUM SERPL-MCNC: 9.1 MG/DL (ref 8.4–10.2)
CALCIUM SERPL-MCNC: 9.3 MG/DL (ref 8.4–10.2)
CHLORIDE SERPL-SCNC: 100 MMOL/L (ref 98–107)
CHLORIDE SERPL-SCNC: 95 MMOL/L (ref 98–107)
CO2 SERPL-SCNC: 34 MMOL/L (ref 22–29)
CO2 SERPL-SCNC: 37 MMOL/L (ref 22–29)
DEPRECATED NEUTROPHILS # BLD AUTO: 13 10*3/UL (ref 2.1–6.9)
DEPRECATED NEUTROPHILS # BLD AUTO: 13.4 10*3/UL (ref 2.1–6.9)
EGFRCR SERPLBLD CKD-EPI 2021: > 60 ML/MIN (ref 60–?)
EGFRCR SERPLBLD CKD-EPI 2021: > 60 ML/MIN (ref 60–?)
EOSINOPHIL # BLD AUTO: 0.1 10*3/UL (ref 0–0.4)
EOSINOPHIL # BLD AUTO: 0.1 10*3/UL (ref 0–0.4)
EOSINOPHIL NFR BLD AUTO: 0.7 % (ref 0–6)
EOSINOPHIL NFR BLD AUTO: 0.7 % (ref 0–6)
EOSINOPHIL NFR BLD MANUAL: 1 % (ref 0–7)
ERYTHROCYTE [DISTWIDTH] IN CORD BLOOD: 16.7 % (ref 11.7–14.4)
ERYTHROCYTE [DISTWIDTH] IN CORD BLOOD: 16.8 % (ref 11.7–14.4)
GLOBULIN PLAS-MCNC: 3.3 G/DL (ref 2.3–3.5)
GLUCOSE SERPLBLD-MCNC: 113 MG/DL (ref 74–118)
GLUCOSE SERPLBLD-MCNC: 137 MG/DL (ref 74–118)
HCT VFR BLD AUTO: 22.1 % (ref 34.2–44.1)
HCT VFR BLD AUTO: 25.5 % (ref 34.2–44.1)
HGB BLD-MCNC: 7.8 G/DL (ref 12–16)
HGB BLD-MCNC: 9.1 G/DL (ref 12–16)
HYPOCHROMIA BLD QL SMEAR: (no result)
LYMPHOCYTES # BLD: 0.7 10*3/UL (ref 1–3.2)
LYMPHOCYTES # BLD: 0.8 10*3/UL (ref 1–3.2)
LYMPHOCYTES NFR BLD AUTO: 4.6 % (ref 18–39.1)
LYMPHOCYTES NFR BLD AUTO: 5.2 % (ref 18–39.1)
LYMPHOCYTES NFR BLD MANUAL: 3 % (ref 19–48)
MAGNESIUM SERPL-MCNC: 1.8 MG/DL (ref 1.3–2.1)
MCH RBC QN AUTO: 33.3 PG (ref 28–32)
MCH RBC QN AUTO: 33.8 PG (ref 28–32)
MCHC RBC AUTO-ENTMCNC: 35.3 G/DL (ref 31–35)
MCHC RBC AUTO-ENTMCNC: 35.7 G/DL (ref 31–35)
MCV RBC AUTO: 93.4 FL (ref 81–99)
MCV RBC AUTO: 95.7 FL (ref 81–99)
MONOCYTES # BLD AUTO: 0.2 10*3/UL (ref 0.2–0.8)
MONOCYTES # BLD AUTO: 0.2 10*3/UL (ref 0.2–0.8)
MONOCYTES NFR BLD AUTO: 1.2 % (ref 4.4–11.3)
MONOCYTES NFR BLD AUTO: 1.2 % (ref 4.4–11.3)
MONOCYTES NFR BLD MANUAL: 1 % (ref 3.4–9)
NEUTS BAND NFR BLD MANUAL: 6 %
NEUTS SEG NFR BLD AUTO: 88.9 % (ref 38.7–80)
NEUTS SEG NFR BLD AUTO: 90.9 % (ref 38.7–80)
NEUTS SEG NFR BLD MANUAL: 89 % (ref 40–74)
PLAT MORPH BLD: (no result)
PLATELET # BLD AUTO: 41 X10E3/UL (ref 140–360)
PLATELET # BLD AUTO: 56 X10E3/UL (ref 140–360)
PLATELET # BLD EST: (no result) 10*3/UL
POIKILOCYTOSIS BLD QL SMEAR: SLIGHT
POTASSIUM SERPL-SCNC: 3 MMOL/L (ref 3.5–5.1)
POTASSIUM SERPL-SCNC: 3.4 MMOL/L (ref 3.5–5.1)
RBC # BLD AUTO: 2.31 X10E6/UL (ref 3.6–5.1)
RBC # BLD AUTO: 2.73 X10E6/UL (ref 3.6–5.1)
RBC MORPH BLD: NORMAL
SODIUM SERPL-SCNC: 144 MMOL/L (ref 136–145)
SODIUM SERPL-SCNC: 146 MMOL/L (ref 136–145)
TARGETS BLD QL SMEAR: (no result)
VANCOMYCIN TROUGH SERPL-MCNC: 19.7 UG/ML (ref 5–10)

## 2018-08-06 PROCEDURE — 30233N1 TRANSFUSION OF NONAUTOLOGOUS RED BLOOD CELLS INTO PERIPHERAL VEIN, PERCUTANEOUS APPROACH: ICD-10-PCS | Performed by: INTERNAL MEDICINE

## 2018-08-06 RX ADMIN — PROPOFOL PRN MLS/HR: 10 INJECTION, EMULSION INTRAVENOUS at 16:59

## 2018-08-06 RX ADMIN — LORAZEPAM PRN MG: 0.5 TABLET ORAL at 11:29

## 2018-08-06 RX ADMIN — PROPOFOL PRN MLS/HR: 10 INJECTION, EMULSION INTRAVENOUS at 10:04

## 2018-08-06 RX ADMIN — CITRIC ACID AND SODIUM CITRATE SCH ML: 640; 490 SOLUTION ORAL at 21:00

## 2018-08-06 RX ADMIN — TAZOBACTAM SODIUM AND PIPERACILLIN SODIUM SCH MLS/HR: 375; 3 INJECTION, SOLUTION INTRAVENOUS at 21:17

## 2018-08-06 RX ADMIN — SODIUM CHLORIDE SCH MG: 900 INJECTION INTRAVENOUS at 17:00

## 2018-08-06 RX ADMIN — SODIUM CHLORIDE SCH MLS/HR: 900 INJECTION INTRAVENOUS at 11:05

## 2018-08-06 RX ADMIN — TAZOBACTAM SODIUM AND PIPERACILLIN SODIUM SCH MLS/HR: 375; 3 INJECTION, SOLUTION INTRAVENOUS at 05:47

## 2018-08-06 RX ADMIN — Medication SCH ML: at 03:01

## 2018-08-06 RX ADMIN — POTASSIUM CHLORIDE PRN MEQ: 1.5 SOLUTION ORAL at 11:07

## 2018-08-06 RX ADMIN — SODIUM CHLORIDE SCH MG: 900 INJECTION INTRAVENOUS at 11:05

## 2018-08-06 RX ADMIN — TAZOBACTAM SODIUM AND PIPERACILLIN SODIUM SCH MLS/HR: 375; 3 INJECTION, SOLUTION INTRAVENOUS at 13:21

## 2018-08-06 RX ADMIN — NICOTINE SCH MG: 7 PATCH, EXTENDED RELEASE TRANSDERMAL at 11:05

## 2018-08-06 RX ADMIN — PROPOFOL PRN MLS/HR: 10 INJECTION, EMULSION INTRAVENOUS at 00:47

## 2018-08-06 RX ADMIN — Medication SCH ML: at 23:00

## 2018-08-06 RX ADMIN — Medication SCH ML: at 07:00

## 2018-08-06 RX ADMIN — VANCOMYCIN HYDROCHLORIDE SCH MLS/HR: 1 INJECTION, SOLUTION INTRAVENOUS at 01:31

## 2018-08-06 RX ADMIN — SODIUM CHLORIDE PRN MLS/HR: 900 INJECTION, SOLUTION INTRAVENOUS at 21:36

## 2018-08-06 RX ADMIN — Medication SCH ML: at 15:00

## 2018-08-06 RX ADMIN — Medication SCH MLS/HR: at 17:00

## 2018-08-06 RX ADMIN — PROPOFOL PRN MLS/HR: 10 INJECTION, EMULSION INTRAVENOUS at 21:36

## 2018-08-06 RX ADMIN — CITRIC ACID AND SODIUM CITRATE SCH ML: 640; 490 SOLUTION ORAL at 11:05

## 2018-08-06 RX ADMIN — Medication SCH ML: at 11:00

## 2018-08-06 RX ADMIN — ACETAMINOPHEN PRN MG: 325 SOLUTION ORAL at 11:29

## 2018-08-06 RX ADMIN — SODIUM CHLORIDE SCH MG: 900 INJECTION INTRAVENOUS at 21:17

## 2018-08-06 RX ADMIN — VANCOMYCIN HYDROCHLORIDE SCH MLS/HR: 1 INJECTION, SOLUTION INTRAVENOUS at 14:00

## 2018-08-06 RX ADMIN — FUROSEMIDE SCH MG: 10 INJECTION, SOLUTION INTRAMUSCULAR; INTRAVENOUS at 21:16

## 2018-08-06 RX ADMIN — Medication SCH ML: at 19:00

## 2018-08-06 RX ADMIN — FUROSEMIDE SCH MG: 10 INJECTION, SOLUTION INTRAMUSCULAR; INTRAVENOUS at 11:05

## 2018-08-06 NOTE — DIAGNOSTIC IMAGING REPORT
CHEST SINGLE (PORTABLE), 8/6/2018 5:24 AM



Technique: CHEST SINGLE (PORTABLE)

Comparison: previous day

Clinical history: Intubated



Findings:

See Impression. Bilateral breast implants.



Impression:

1. Lines/Tubes: Stable right PICC at the cavoatrial junction, ET tube 1.8 cm

above the jennifer and subdiaphragmatic NG tube. 

2. No significant change in extensive bilateral airspace opacities and

suspected underlying effusions.

3. Normal visualized cardiomediastinal silhouette..



Signed by: Dr Faith Schwartz MD on 8/6/2018 6:45 AM

## 2018-08-06 NOTE — DIAGNOSTIC IMAGING REPORT
Exam: Ultrasound of chest



Clinical history:



Technique: Using a curved array transducer, real-time imaging of the chest of

() is in the thorax was performed with images saved in sagittal and transverse

planes.



Discussion: There is trace pleural effusion bilaterally.



Impression:  Trace pleural effusion bilaterally.



Signed by: Dr. DIEGO Kellogg M.D. on 8/6/2018 5:56 PM

## 2018-08-07 VITALS — SYSTOLIC BLOOD PRESSURE: 101 MMHG | DIASTOLIC BLOOD PRESSURE: 62 MMHG

## 2018-08-07 VITALS — DIASTOLIC BLOOD PRESSURE: 56 MMHG | SYSTOLIC BLOOD PRESSURE: 95 MMHG

## 2018-08-07 VITALS — SYSTOLIC BLOOD PRESSURE: 90 MMHG | DIASTOLIC BLOOD PRESSURE: 47 MMHG

## 2018-08-07 VITALS — DIASTOLIC BLOOD PRESSURE: 56 MMHG | SYSTOLIC BLOOD PRESSURE: 101 MMHG

## 2018-08-07 VITALS — SYSTOLIC BLOOD PRESSURE: 108 MMHG | DIASTOLIC BLOOD PRESSURE: 65 MMHG

## 2018-08-07 VITALS — SYSTOLIC BLOOD PRESSURE: 106 MMHG | DIASTOLIC BLOOD PRESSURE: 61 MMHG

## 2018-08-07 VITALS — SYSTOLIC BLOOD PRESSURE: 94 MMHG | DIASTOLIC BLOOD PRESSURE: 51 MMHG

## 2018-08-07 VITALS — DIASTOLIC BLOOD PRESSURE: 67 MMHG | SYSTOLIC BLOOD PRESSURE: 109 MMHG

## 2018-08-07 VITALS — DIASTOLIC BLOOD PRESSURE: 55 MMHG | SYSTOLIC BLOOD PRESSURE: 102 MMHG

## 2018-08-07 VITALS — SYSTOLIC BLOOD PRESSURE: 80 MMHG | DIASTOLIC BLOOD PRESSURE: 40 MMHG

## 2018-08-07 VITALS — DIASTOLIC BLOOD PRESSURE: 73 MMHG | SYSTOLIC BLOOD PRESSURE: 110 MMHG

## 2018-08-07 VITALS — SYSTOLIC BLOOD PRESSURE: 107 MMHG | DIASTOLIC BLOOD PRESSURE: 62 MMHG

## 2018-08-07 VITALS — DIASTOLIC BLOOD PRESSURE: 56 MMHG | SYSTOLIC BLOOD PRESSURE: 107 MMHG

## 2018-08-07 VITALS — SYSTOLIC BLOOD PRESSURE: 109 MMHG | DIASTOLIC BLOOD PRESSURE: 67 MMHG

## 2018-08-07 VITALS — DIASTOLIC BLOOD PRESSURE: 54 MMHG | SYSTOLIC BLOOD PRESSURE: 111 MMHG

## 2018-08-07 VITALS — SYSTOLIC BLOOD PRESSURE: 88 MMHG | DIASTOLIC BLOOD PRESSURE: 52 MMHG

## 2018-08-07 VITALS — DIASTOLIC BLOOD PRESSURE: 55 MMHG | SYSTOLIC BLOOD PRESSURE: 98 MMHG

## 2018-08-07 VITALS — DIASTOLIC BLOOD PRESSURE: 96 MMHG | SYSTOLIC BLOOD PRESSURE: 129 MMHG

## 2018-08-07 VITALS — SYSTOLIC BLOOD PRESSURE: 95 MMHG | DIASTOLIC BLOOD PRESSURE: 56 MMHG

## 2018-08-07 VITALS — SYSTOLIC BLOOD PRESSURE: 92 MMHG | DIASTOLIC BLOOD PRESSURE: 55 MMHG

## 2018-08-07 VITALS — SYSTOLIC BLOOD PRESSURE: 102 MMHG | DIASTOLIC BLOOD PRESSURE: 52 MMHG

## 2018-08-07 VITALS — SYSTOLIC BLOOD PRESSURE: 97 MMHG | DIASTOLIC BLOOD PRESSURE: 53 MMHG

## 2018-08-07 VITALS — DIASTOLIC BLOOD PRESSURE: 56 MMHG | SYSTOLIC BLOOD PRESSURE: 100 MMHG

## 2018-08-07 VITALS — SYSTOLIC BLOOD PRESSURE: 116 MMHG | DIASTOLIC BLOOD PRESSURE: 84 MMHG

## 2018-08-07 VITALS — SYSTOLIC BLOOD PRESSURE: 100 MMHG | DIASTOLIC BLOOD PRESSURE: 57 MMHG

## 2018-08-07 VITALS — SYSTOLIC BLOOD PRESSURE: 99 MMHG | DIASTOLIC BLOOD PRESSURE: 55 MMHG

## 2018-08-07 VITALS — DIASTOLIC BLOOD PRESSURE: 51 MMHG | SYSTOLIC BLOOD PRESSURE: 94 MMHG

## 2018-08-07 VITALS — SYSTOLIC BLOOD PRESSURE: 96 MMHG | DIASTOLIC BLOOD PRESSURE: 56 MMHG

## 2018-08-07 VITALS — DIASTOLIC BLOOD PRESSURE: 53 MMHG | SYSTOLIC BLOOD PRESSURE: 92 MMHG

## 2018-08-07 VITALS — DIASTOLIC BLOOD PRESSURE: 53 MMHG | SYSTOLIC BLOOD PRESSURE: 117 MMHG

## 2018-08-07 VITALS — SYSTOLIC BLOOD PRESSURE: 94 MMHG | DIASTOLIC BLOOD PRESSURE: 56 MMHG

## 2018-08-07 VITALS — SYSTOLIC BLOOD PRESSURE: 100 MMHG | DIASTOLIC BLOOD PRESSURE: 56 MMHG

## 2018-08-07 VITALS — DIASTOLIC BLOOD PRESSURE: 37 MMHG | SYSTOLIC BLOOD PRESSURE: 77 MMHG

## 2018-08-07 VITALS — SYSTOLIC BLOOD PRESSURE: 106 MMHG | DIASTOLIC BLOOD PRESSURE: 64 MMHG

## 2018-08-07 VITALS — SYSTOLIC BLOOD PRESSURE: 86 MMHG | DIASTOLIC BLOOD PRESSURE: 53 MMHG

## 2018-08-07 VITALS — DIASTOLIC BLOOD PRESSURE: 57 MMHG | SYSTOLIC BLOOD PRESSURE: 95 MMHG

## 2018-08-07 VITALS — SYSTOLIC BLOOD PRESSURE: 102 MMHG | DIASTOLIC BLOOD PRESSURE: 57 MMHG

## 2018-08-07 VITALS — SYSTOLIC BLOOD PRESSURE: 89 MMHG | DIASTOLIC BLOOD PRESSURE: 54 MMHG

## 2018-08-07 VITALS — DIASTOLIC BLOOD PRESSURE: 56 MMHG | SYSTOLIC BLOOD PRESSURE: 98 MMHG

## 2018-08-07 VITALS — DIASTOLIC BLOOD PRESSURE: 57 MMHG | SYSTOLIC BLOOD PRESSURE: 105 MMHG

## 2018-08-07 VITALS — DIASTOLIC BLOOD PRESSURE: 74 MMHG | SYSTOLIC BLOOD PRESSURE: 124 MMHG

## 2018-08-07 VITALS — SYSTOLIC BLOOD PRESSURE: 89 MMHG | DIASTOLIC BLOOD PRESSURE: 56 MMHG

## 2018-08-07 VITALS — SYSTOLIC BLOOD PRESSURE: 113 MMHG | DIASTOLIC BLOOD PRESSURE: 70 MMHG

## 2018-08-07 VITALS — DIASTOLIC BLOOD PRESSURE: 69 MMHG | SYSTOLIC BLOOD PRESSURE: 107 MMHG

## 2018-08-07 VITALS — DIASTOLIC BLOOD PRESSURE: 76 MMHG | SYSTOLIC BLOOD PRESSURE: 106 MMHG

## 2018-08-07 VITALS — DIASTOLIC BLOOD PRESSURE: 60 MMHG | SYSTOLIC BLOOD PRESSURE: 101 MMHG

## 2018-08-07 VITALS — DIASTOLIC BLOOD PRESSURE: 59 MMHG | SYSTOLIC BLOOD PRESSURE: 114 MMHG

## 2018-08-07 VITALS — DIASTOLIC BLOOD PRESSURE: 36 MMHG | SYSTOLIC BLOOD PRESSURE: 80 MMHG

## 2018-08-07 VITALS — SYSTOLIC BLOOD PRESSURE: 110 MMHG | DIASTOLIC BLOOD PRESSURE: 70 MMHG

## 2018-08-07 VITALS — SYSTOLIC BLOOD PRESSURE: 79 MMHG | DIASTOLIC BLOOD PRESSURE: 36 MMHG

## 2018-08-07 VITALS — SYSTOLIC BLOOD PRESSURE: 96 MMHG | DIASTOLIC BLOOD PRESSURE: 52 MMHG

## 2018-08-07 LAB
ANION GAP SERPL CALC-SCNC: 17.3 MMOL/L (ref 8–16)
ANISOCYTOSIS BLD QL SMEAR: SLIGHT
BASE EXCESS BLDA CALC-SCNC: 20 MMOL/L (ref -2–3)
BASOPHILS # BLD AUTO: 0.1 10*3/UL (ref 0–0.1)
BASOPHILS NFR BLD AUTO: 0.4 % (ref 0–1)
BUN SERPL-MCNC: 32 MG/DL (ref 7–26)
BUN/CREAT SERPL: 40 (ref 6–25)
CALCIUM SERPL-MCNC: 9.5 MG/DL (ref 8.4–10.2)
CHLORIDE SERPL-SCNC: 94 MMOL/L (ref 98–107)
CO2 SERPL-SCNC: 39 MMOL/L (ref 22–29)
DEPRECATED NEUTROPHILS # BLD AUTO: 12.6 10*3/UL (ref 2.1–6.9)
EGFRCR SERPLBLD CKD-EPI 2021: > 60 ML/MIN (ref 60–?)
EOSINOPHIL # BLD AUTO: 0.1 10*3/UL (ref 0–0.4)
EOSINOPHIL NFR BLD AUTO: 1 % (ref 0–6)
EOSINOPHIL NFR BLD MANUAL: 1 % (ref 0–7)
ERYTHROCYTE [DISTWIDTH] IN CORD BLOOD: 17.2 % (ref 11.7–14.4)
GLUCOSE SERPLBLD-MCNC: 86 MG/DL (ref 74–118)
HCO3 BLDA-SCNC: 42 MMOL/L (ref 23–28)
HCT VFR BLD AUTO: 24.6 % (ref 34.2–44.1)
HEMOCCULT STL QL: POSITIVE
HGB BLD-MCNC: 8.7 G/DL (ref 12–16)
HYPOCHROMIA BLD QL SMEAR: SLIGHT
LYMPHOCYTES # BLD: 0.7 10*3/UL (ref 1–3.2)
LYMPHOCYTES NFR BLD AUTO: 5.1 % (ref 18–39.1)
LYMPHOCYTES NFR BLD MANUAL: 4 % (ref 19–48)
MCH RBC QN AUTO: 33.1 PG (ref 28–32)
MCHC RBC AUTO-ENTMCNC: 35.4 G/DL (ref 31–35)
MCV RBC AUTO: 93.5 FL (ref 81–99)
MONOCYTES # BLD AUTO: 0.3 10*3/UL (ref 0.2–0.8)
MONOCYTES NFR BLD AUTO: 1.8 % (ref 4.4–11.3)
MYELOCYTES NFR BLD MANUAL: 1 % (ref 0–0)
NEUTS BAND NFR BLD MANUAL: 5 %
NEUTS SEG NFR BLD AUTO: 89.3 % (ref 38.7–80)
NEUTS SEG NFR BLD MANUAL: 86 % (ref 40–74)
NRBC/RBC NFR BLD MANUAL: 1 %
PCO2 BLDA: 49 MMHG (ref 41–51)
PCO2 BLDA: 57 MMHG (ref 80–105)
PH BLDA: 7.54 [PH] (ref 7.31–7.41)
PLAT MORPH BLD: NORMAL
PLATELET # BLD AUTO: 45 X10E3/UL (ref 140–360)
PLATELET # BLD EST: (no result) 10*3/UL
POIKILOCYTOSIS BLD QL SMEAR: SLIGHT
POTASSIUM SERPL-SCNC: 3.3 MMOL/L (ref 3.5–5.1)
RBC # BLD AUTO: 2.63 X10E6/UL (ref 3.6–5.1)
RBC MORPH BLD: NORMAL
SAO2 % BLDA: 92 % (ref 95–98)
SODIUM SERPL-SCNC: 147 MMOL/L (ref 136–145)
TARGETS BLD QL SMEAR: (no result)

## 2018-08-07 RX ADMIN — SODIUM CHLORIDE SCH MLS/HR: 900 INJECTION INTRAVENOUS at 09:34

## 2018-08-07 RX ADMIN — TAZOBACTAM SODIUM AND PIPERACILLIN SODIUM SCH MLS/HR: 375; 3 INJECTION, SOLUTION INTRAVENOUS at 04:03

## 2018-08-07 RX ADMIN — Medication SCH ML: at 19:00

## 2018-08-07 RX ADMIN — Medication SCH ML: at 11:00

## 2018-08-07 RX ADMIN — Medication SCH ML: at 15:00

## 2018-08-07 RX ADMIN — SODIUM CHLORIDE SCH MG: 900 INJECTION INTRAVENOUS at 21:11

## 2018-08-07 RX ADMIN — Medication SCH ML: at 02:49

## 2018-08-07 RX ADMIN — TAZOBACTAM SODIUM AND PIPERACILLIN SODIUM SCH MLS/HR: 375; 3 INJECTION, SOLUTION INTRAVENOUS at 21:11

## 2018-08-07 RX ADMIN — PROPOFOL PRN MLS/HR: 10 INJECTION, EMULSION INTRAVENOUS at 19:00

## 2018-08-07 RX ADMIN — POTASSIUM CHLORIDE PRN MEQ: 1.5 SOLUTION ORAL at 09:35

## 2018-08-07 RX ADMIN — Medication SCH ML: at 07:00

## 2018-08-07 RX ADMIN — LORAZEPAM PRN MG: 0.5 TABLET ORAL at 13:13

## 2018-08-07 RX ADMIN — Medication SCH MLS/HR: at 17:00

## 2018-08-07 RX ADMIN — TAZOBACTAM SODIUM AND PIPERACILLIN SODIUM SCH MLS/HR: 375; 3 INJECTION, SOLUTION INTRAVENOUS at 13:13

## 2018-08-07 RX ADMIN — VANCOMYCIN HYDROCHLORIDE SCH MG: 250 CAPSULE ORAL at 23:58

## 2018-08-07 RX ADMIN — VANCOMYCIN HYDROCHLORIDE SCH MG: 250 CAPSULE ORAL at 13:13

## 2018-08-07 RX ADMIN — VANCOMYCIN HYDROCHLORIDE SCH MLS/HR: 1 INJECTION, SOLUTION INTRAVENOUS at 09:34

## 2018-08-07 RX ADMIN — PROPOFOL PRN MLS/HR: 10 INJECTION, EMULSION INTRAVENOUS at 02:43

## 2018-08-07 RX ADMIN — SODIUM CHLORIDE SCH MG: 900 INJECTION INTRAVENOUS at 09:34

## 2018-08-07 RX ADMIN — NICOTINE SCH MG: 7 PATCH, EXTENDED RELEASE TRANSDERMAL at 09:34

## 2018-08-07 RX ADMIN — Medication SCH ML: at 23:00

## 2018-08-07 RX ADMIN — ACETAMINOPHEN PRN MG: 325 SOLUTION ORAL at 09:35

## 2018-08-07 RX ADMIN — VANCOMYCIN HYDROCHLORIDE SCH MG: 250 CAPSULE ORAL at 18:34

## 2018-08-07 NOTE — CONSULTATION
DATE OF CONSULTATION:  August 07, 2018 



GI CONSULT NOTE 



CONSULTING PHYSICIAN:  Dr. Dave Craig. 



REASON FOR CONSULTATION:  Evaluation for possible EGD/PEG.



HISTORY OF PRESENT ILLNESS:  A 47-year-old female whose currently intubated 

on mechanical ventilator, getting enteral feeding through the nasogastric 

tube.  She is also on sedation, therefore, I cannot derive any history from 

the patient.  Most of my information is derived from the medical chart.  

She got admitted.  She has history of alcoholic liver cirrhosis.  She got 

admitted with episodes of nausea and vomiting after  excessive consumption 

of alcohol.  She was noted to have aspiration pneumonia.  She was noted to 

be respiratory failure requiring mechanical ventilation.  She was found to 

have severe aspiration pneumonia.  Currently being treated with 

broad-spectrum intravenous antibiotic.  She has been on ventilator for 

close to a week.  She is difficult to extubate, not meeting the weaning 

parameters.  It is likely that she may end up getting tracheostomy.  GI has 

been consulted to evaluate her for possible EGD/PEG placement.



REVIEW OF SYSTEMS:  Unobtainable. 



PAST MEDICAL HISTORY:  Alcoholic liver cirrhosis, sacral decubitus ulcer.  

Previous aspiration pneumonias.  



PAST SURGICAL HISTORY:  Eye surgery for amblyopia in childhood.  



FAMILY HISTORY:  Noncontributory.



SOCIAL HISTORY:  Alcohol dependence.



Not sure if she is a smoker.  Also, not sure about any illicit drugs.  



FAMILY HISTORY:  Noncontributory.   



ALLERGIES:  NO KNOWN DRUG ALLERGIES.  



HOME MEDICATIONS:  Meclizine and nicotine dermal patch. 

 

PHYSICAL EXAMINATION:   

VITALS:  Temperature 98.3, pulse 91, respirations 20.  Blood pressure 88/52 

to 92/55, oxygen  saturation 94% on current ventilator setting. 

GENERAL:  Intubated, sedated and on mechanical ventilator. 

HEENT:  Moist mucous membrane.  Anicteric sclerae.  NG  is in place.  Oral 

mucosa is moist.  CVS:  S1 and S2 regular. 

LUNGS:  Lungs bilateral scattered rales.  Rales are more prominent on the 

right side. 

ABDOMEN:  Soft, nondistended, nontender, no palpable mass or hernia.  

Positive bowel sounds. Shifting dullness not digitally appreciable. 

EXTREMITIES:  Warm.  No leg edema. 



LABORATORY DATA:  WBC 14.11, hemoglobin 8.7, hematocrit 24.6, MCV 93.5, 

platelet count of 45, PT 13.4, INR 1.10, PTT 34.6, sodium 147, potassium 

3.3, chloride 94, bicarb 39, BUN 32, creatinine 0.81, glucose 86.  Liver 

enzymes on 08/06/2018 showed a total bilirubin 0.5, AST 28 ALT, 20 and 

alkaline phosphatase 115.  



Ultrasound of the abdomen done on 08/03/2018 showed increased liver 

echogenicity suggestive of hepatocellular dysfunction such as hepatic 

steatosis, trace ascites and right pleural effusion. 



Chest x-ray on admission showed right basilar consolidation, right pleural 

effusion suggestive of multifocal pneumonia. 



IMPRESSION:  

1. Acute respiratory failure secondary to aspiration pneumonia.

2. Alcohol dependence, alcoholic liver cirrhosis with trace ascites.

3. Portal hypertension that is causing thrombocytopenia.

4. Decubitus ulcer.



PLAN:  Continue present medical management.  Continue broad-spectrum 

intravenous antibiotic.  Will discontinue nasogastric feedings, check 

residuals.  If residual is high, such as more than 2 mL, then rate needs to 

be adjusted.  Notify GI for high residuals.  NG feeding can be continued.  

Due to presence of ascites, patient will not be a candidate for EGD and 

PEG.



As far as putting gastrostomy tube endoscopically in the setting of 

ascites, will run the risk of gastrostomy site leak.  



I thank Dr. Craig for allowing me to participate in the care of this 

patient.



 





DD:  08/07/2018 17:14

DT:  08/07/2018 17:33

Job#:  [id number]

## 2018-08-07 NOTE — DIAGNOSTIC IMAGING REPORT
EXAM: CHEST SINGLE (PORTABLE), AP 1 view

INDICATION: Intubated

COMPARISON: AP view of the chest August 6, 2018



FINDINGS:

LINES/TUBES: The endotracheal tube terminates 3.5 cm above the jennifer. Right

approach PICC terminates in expected location of the atriocaval junction.

Nasal/orogastric tube terminates in the expected location of the body of the

stomach



LUNGS: Stable diffuse bilateral airspace opacities 



PLEURA: Suspected small bilateral pleural effusions.



HEART AND MEDIASTINUM: Normal size and contour.



BONES AND SOFT TISSUES: No acute findings. 



IMPRESSION:

No interval change in appearance of the chest with diffuse bilateral airspace

opacities.







Signed by: Dr. Galilea Gagnon M.D. on 8/7/2018 6:12 AM

## 2018-08-08 VITALS — DIASTOLIC BLOOD PRESSURE: 66 MMHG | SYSTOLIC BLOOD PRESSURE: 117 MMHG

## 2018-08-08 VITALS — DIASTOLIC BLOOD PRESSURE: 94 MMHG | SYSTOLIC BLOOD PRESSURE: 134 MMHG

## 2018-08-08 VITALS — SYSTOLIC BLOOD PRESSURE: 146 MMHG | DIASTOLIC BLOOD PRESSURE: 93 MMHG

## 2018-08-08 VITALS — DIASTOLIC BLOOD PRESSURE: 69 MMHG | SYSTOLIC BLOOD PRESSURE: 116 MMHG

## 2018-08-08 VITALS — SYSTOLIC BLOOD PRESSURE: 144 MMHG | DIASTOLIC BLOOD PRESSURE: 87 MMHG

## 2018-08-08 VITALS — SYSTOLIC BLOOD PRESSURE: 132 MMHG | DIASTOLIC BLOOD PRESSURE: 102 MMHG

## 2018-08-08 VITALS — SYSTOLIC BLOOD PRESSURE: 137 MMHG | DIASTOLIC BLOOD PRESSURE: 99 MMHG

## 2018-08-08 VITALS — DIASTOLIC BLOOD PRESSURE: 78 MMHG | SYSTOLIC BLOOD PRESSURE: 109 MMHG

## 2018-08-08 VITALS — SYSTOLIC BLOOD PRESSURE: 136 MMHG | DIASTOLIC BLOOD PRESSURE: 85 MMHG

## 2018-08-08 VITALS — DIASTOLIC BLOOD PRESSURE: 91 MMHG | SYSTOLIC BLOOD PRESSURE: 147 MMHG

## 2018-08-08 VITALS — DIASTOLIC BLOOD PRESSURE: 77 MMHG | SYSTOLIC BLOOD PRESSURE: 122 MMHG

## 2018-08-08 VITALS — DIASTOLIC BLOOD PRESSURE: 74 MMHG | SYSTOLIC BLOOD PRESSURE: 111 MMHG

## 2018-08-08 VITALS — DIASTOLIC BLOOD PRESSURE: 67 MMHG | SYSTOLIC BLOOD PRESSURE: 114 MMHG

## 2018-08-08 VITALS — SYSTOLIC BLOOD PRESSURE: 124 MMHG | DIASTOLIC BLOOD PRESSURE: 79 MMHG

## 2018-08-08 VITALS — SYSTOLIC BLOOD PRESSURE: 126 MMHG | DIASTOLIC BLOOD PRESSURE: 93 MMHG

## 2018-08-08 VITALS — SYSTOLIC BLOOD PRESSURE: 146 MMHG | DIASTOLIC BLOOD PRESSURE: 90 MMHG

## 2018-08-08 VITALS — DIASTOLIC BLOOD PRESSURE: 87 MMHG | SYSTOLIC BLOOD PRESSURE: 138 MMHG

## 2018-08-08 VITALS — DIASTOLIC BLOOD PRESSURE: 76 MMHG | SYSTOLIC BLOOD PRESSURE: 122 MMHG

## 2018-08-08 VITALS — DIASTOLIC BLOOD PRESSURE: 97 MMHG | SYSTOLIC BLOOD PRESSURE: 155 MMHG

## 2018-08-08 VITALS — SYSTOLIC BLOOD PRESSURE: 131 MMHG | DIASTOLIC BLOOD PRESSURE: 76 MMHG

## 2018-08-08 VITALS — SYSTOLIC BLOOD PRESSURE: 141 MMHG | DIASTOLIC BLOOD PRESSURE: 85 MMHG

## 2018-08-08 VITALS — SYSTOLIC BLOOD PRESSURE: 111 MMHG | DIASTOLIC BLOOD PRESSURE: 85 MMHG

## 2018-08-08 VITALS — DIASTOLIC BLOOD PRESSURE: 79 MMHG | SYSTOLIC BLOOD PRESSURE: 116 MMHG

## 2018-08-08 VITALS — DIASTOLIC BLOOD PRESSURE: 65 MMHG | SYSTOLIC BLOOD PRESSURE: 113 MMHG

## 2018-08-08 VITALS — DIASTOLIC BLOOD PRESSURE: 82 MMHG | SYSTOLIC BLOOD PRESSURE: 129 MMHG

## 2018-08-08 VITALS — DIASTOLIC BLOOD PRESSURE: 85 MMHG | SYSTOLIC BLOOD PRESSURE: 136 MMHG

## 2018-08-08 VITALS — DIASTOLIC BLOOD PRESSURE: 71 MMHG | SYSTOLIC BLOOD PRESSURE: 117 MMHG

## 2018-08-08 VITALS — DIASTOLIC BLOOD PRESSURE: 82 MMHG | SYSTOLIC BLOOD PRESSURE: 122 MMHG

## 2018-08-08 VITALS — DIASTOLIC BLOOD PRESSURE: 93 MMHG | SYSTOLIC BLOOD PRESSURE: 158 MMHG

## 2018-08-08 VITALS — SYSTOLIC BLOOD PRESSURE: 123 MMHG | DIASTOLIC BLOOD PRESSURE: 91 MMHG

## 2018-08-08 VITALS — SYSTOLIC BLOOD PRESSURE: 116 MMHG | DIASTOLIC BLOOD PRESSURE: 69 MMHG

## 2018-08-08 VITALS — DIASTOLIC BLOOD PRESSURE: 71 MMHG | SYSTOLIC BLOOD PRESSURE: 128 MMHG

## 2018-08-08 VITALS — DIASTOLIC BLOOD PRESSURE: 94 MMHG | SYSTOLIC BLOOD PRESSURE: 133 MMHG

## 2018-08-08 VITALS — DIASTOLIC BLOOD PRESSURE: 65 MMHG | SYSTOLIC BLOOD PRESSURE: 116 MMHG

## 2018-08-08 VITALS — DIASTOLIC BLOOD PRESSURE: 85 MMHG | SYSTOLIC BLOOD PRESSURE: 141 MMHG

## 2018-08-08 VITALS — DIASTOLIC BLOOD PRESSURE: 83 MMHG | SYSTOLIC BLOOD PRESSURE: 140 MMHG

## 2018-08-08 VITALS — DIASTOLIC BLOOD PRESSURE: 75 MMHG | SYSTOLIC BLOOD PRESSURE: 129 MMHG

## 2018-08-08 VITALS — SYSTOLIC BLOOD PRESSURE: 124 MMHG | DIASTOLIC BLOOD PRESSURE: 77 MMHG

## 2018-08-08 VITALS — DIASTOLIC BLOOD PRESSURE: 79 MMHG | SYSTOLIC BLOOD PRESSURE: 130 MMHG

## 2018-08-08 VITALS — SYSTOLIC BLOOD PRESSURE: 117 MMHG | DIASTOLIC BLOOD PRESSURE: 66 MMHG

## 2018-08-08 VITALS — SYSTOLIC BLOOD PRESSURE: 121 MMHG | DIASTOLIC BLOOD PRESSURE: 76 MMHG

## 2018-08-08 VITALS — DIASTOLIC BLOOD PRESSURE: 102 MMHG | SYSTOLIC BLOOD PRESSURE: 133 MMHG

## 2018-08-08 VITALS — DIASTOLIC BLOOD PRESSURE: 71 MMHG | SYSTOLIC BLOOD PRESSURE: 113 MMHG

## 2018-08-08 VITALS — SYSTOLIC BLOOD PRESSURE: 140 MMHG | DIASTOLIC BLOOD PRESSURE: 101 MMHG

## 2018-08-08 VITALS — SYSTOLIC BLOOD PRESSURE: 123 MMHG | DIASTOLIC BLOOD PRESSURE: 80 MMHG

## 2018-08-08 VITALS — SYSTOLIC BLOOD PRESSURE: 111 MMHG | DIASTOLIC BLOOD PRESSURE: 74 MMHG

## 2018-08-08 VITALS — DIASTOLIC BLOOD PRESSURE: 74 MMHG | SYSTOLIC BLOOD PRESSURE: 122 MMHG

## 2018-08-08 VITALS — SYSTOLIC BLOOD PRESSURE: 120 MMHG | DIASTOLIC BLOOD PRESSURE: 68 MMHG

## 2018-08-08 VITALS — SYSTOLIC BLOOD PRESSURE: 108 MMHG | DIASTOLIC BLOOD PRESSURE: 72 MMHG

## 2018-08-08 LAB
ANION GAP SERPL CALC-SCNC: 14.8 MMOL/L (ref 8–16)
BUN SERPL-MCNC: 25 MG/DL (ref 7–26)
BUN/CREAT SERPL: 37 (ref 6–25)
C DIFFICILE TOXIN A&B AMP PROB: NEGATIVE
CALCIUM SERPL-MCNC: 8.5 MG/DL (ref 8.4–10.2)
CHLORIDE SERPL-SCNC: 99 MMOL/L (ref 98–107)
CO2 SERPL-SCNC: 31 MMOL/L (ref 22–29)
EGFRCR SERPLBLD CKD-EPI 2021: > 60 ML/MIN (ref 60–?)
ERYTHROCYTE [DISTWIDTH] IN CORD BLOOD: 16.2 % (ref 11.7–14.4)
GLUCOSE SERPLBLD-MCNC: 91 MG/DL (ref 74–118)
HCT VFR BLD AUTO: 33.4 % (ref 34.2–44.1)
HGB BLD-MCNC: 11.8 G/DL (ref 12–16)
MAGNESIUM SERPL-MCNC: 0.9 MG/DL (ref 1.3–2.1)
MCH RBC QN AUTO: 32.6 PG (ref 28–32)
MCHC RBC AUTO-ENTMCNC: 35.3 G/DL (ref 31–35)
MCV RBC AUTO: 92.3 FL (ref 81–99)
PLATELET # BLD AUTO: 72 X10E3/UL (ref 140–360)
POTASSIUM SERPL-SCNC: 2.8 MMOL/L (ref 3.5–5.1)
RBC # BLD AUTO: 3.62 X10E6/UL (ref 3.6–5.1)
SODIUM SERPL-SCNC: 142 MMOL/L (ref 136–145)

## 2018-08-08 RX ADMIN — TAZOBACTAM SODIUM AND PIPERACILLIN SODIUM SCH MLS/HR: 375; 3 INJECTION, SOLUTION INTRAVENOUS at 04:52

## 2018-08-08 RX ADMIN — Medication SCH ML: at 03:00

## 2018-08-08 RX ADMIN — NICOTINE SCH MG: 7 PATCH, EXTENDED RELEASE TRANSDERMAL at 11:45

## 2018-08-08 RX ADMIN — TAZOBACTAM SODIUM AND PIPERACILLIN SODIUM SCH MLS/HR: 375; 3 INJECTION, SOLUTION INTRAVENOUS at 20:58

## 2018-08-08 RX ADMIN — Medication SCH ML: at 10:42

## 2018-08-08 RX ADMIN — Medication SCH ML: at 19:00

## 2018-08-08 RX ADMIN — SODIUM CHLORIDE SCH MLS/HR: 900 INJECTION INTRAVENOUS at 09:00

## 2018-08-08 RX ADMIN — VANCOMYCIN HYDROCHLORIDE SCH MLS/HR: 1 INJECTION, SOLUTION INTRAVENOUS at 12:00

## 2018-08-08 RX ADMIN — Medication SCH ML: at 07:00

## 2018-08-08 RX ADMIN — SODIUM CHLORIDE SCH MG: 900 INJECTION INTRAVENOUS at 11:45

## 2018-08-08 RX ADMIN — TAZOBACTAM SODIUM AND PIPERACILLIN SODIUM SCH MLS/HR: 375; 3 INJECTION, SOLUTION INTRAVENOUS at 13:35

## 2018-08-08 RX ADMIN — Medication SCH ML: at 23:00

## 2018-08-08 RX ADMIN — PROPOFOL PRN MLS/HR: 10 INJECTION, EMULSION INTRAVENOUS at 07:09

## 2018-08-08 RX ADMIN — SODIUM CHLORIDE SCH MG: 900 INJECTION INTRAVENOUS at 20:57

## 2018-08-08 RX ADMIN — VANCOMYCIN HYDROCHLORIDE SCH MG: 250 CAPSULE ORAL at 05:10

## 2018-08-08 RX ADMIN — POTASSIUM CHLORIDE SCH MEQ: 1.5 SOLUTION ORAL at 19:30

## 2018-08-08 RX ADMIN — POTASSIUM CHLORIDE SCH MEQ: 1.5 SOLUTION ORAL at 17:30

## 2018-08-08 RX ADMIN — Medication SCH ML: at 15:00

## 2018-08-08 RX ADMIN — POTASSIUM CHLORIDE SCH MEQ: 1.5 SOLUTION ORAL at 14:00

## 2018-08-08 RX ADMIN — PROPOFOL PRN MLS/HR: 10 INJECTION, EMULSION INTRAVENOUS at 01:17

## 2018-08-08 RX ADMIN — POTASSIUM CHLORIDE SCH MEQ: 1.5 SOLUTION ORAL at 16:00

## 2018-08-08 RX ADMIN — Medication SCH MLS/HR: at 17:00

## 2018-08-08 NOTE — PROGRESS NOTE
DATE:  August 08, 2018 



GI PROGRESS NOTE 



SUBJECTIVE:  Patient is tolerating nasogastric feedings.  She is likely 

going to get tracheostomy Friday. 



REVIEW OF SYSTEMS:  Unobtainable. 



INPATIENT MEDICATIONS:  List reviewed as per MAR. 



PHYSICAL EXAMINATION   

VITAL SIGNS:  Temperature 99, pulse 31, respiration 158/93, oxygen 

saturation 93% on present ventilator setting. 

GENERAL:  Intubated but not sedated.  Eyes open.  Oral mucosa is moist.  

Anicteric sclerae. 

ABDOMEN:  Soft, nondistended, nontender.  No palpable mass or hernia.  

Shifting dullness digitally not appreciable. 



LABS:  WBC has gone up to 15.76 from 14.11, hemoglobin 11.8, hematocrit 

33.4, MCV 92.3, platelet count 72,000.  Sodium 142, potassium 2.8, chloride 

99, bicarb 31, BUN 25, creatinine 0.68.  



Chest x-ray, slight interval increase of the bilateral diffuse air space 

opacities which may represent any combination of multifocal pneumonia, ARDS 

and pulmonary edema.



So oropharyngeal dysphagia on enteral feeding, speech and swallow 

evaluation has not been done.  She is likely going to get a tracheostomy.





PLAN:  From GI standpoint, patient should undergo swallow evaluation after 

tracheostomy.  If she fails then obviously she will need feeding 

gastrostomy.  Given her history of cirrhosis with ascites, she is not a 

candidate for endoscopy gastrostomy.  High risk of leak secondary to 

underlying ascites.  Correct hypokalemia.



Will continue to follow along with you.







DD:  08/08/2018 19:01

DT:  08/08/2018 19:12

Job#:  [id number]

## 2018-08-08 NOTE — CONSULTATION
DATE OF CONSULTATION:  August 08, 2018 



HOSPITAL CONSULTATION



HISTORY OF PRESENT ILLNESS:  I was kindly asked to see this 47-year-old 

woman for evaluation of tracheostomy tube placement.  The patient has 

required prolonged ventilator support and it is anticipated that she will 

continue to need ventilator support.  Her history of present illness, past 

medical history and past surgical history was reviewed in detail in the 

chart.



PHYSICAL EXAMINATION:  There is no abnormal neck anatomy noted.  The 

patient is intubated on the ventilator. 



ASSESSMENT:  Respiratory failure.  



PLAN:  Tracheostomy.



 





DD:  08/08/2018 07:34

DT:  08/08/2018 07:57

Job#:  [id number]

## 2018-08-08 NOTE — DIAGNOSTIC IMAGING REPORT
PROCEDURE:

A single AP view of the chest.

 

COMPARISON: None.

 

INDICATIONS:   INTUBATED

 

FINDINGS:

LINES/TUBES: The endotracheal tube terminates 4.5 cm above the jennifer. 

Right sided PICC terminating in the expected location of the cavoatrial 

junction. Enteric tube terminates in the expected location of the body 

of the stomach. 

 

LUNGS: Slightly increased diffuse bilateral airspace opacities. 

 

PLEURA: Likely small bilateral pleural effusions.

 

HEART AND MEDIASTINUM: The cardiomediastinal silhouette is unchanged.

 

BONES AND SOFT TISSUES: No acute findings.

 

IMPRESSION:

Slight interval increase of bilateral diffuse airspace opacities, which 

may represent any combination of multifocal pneumonia, ARDS, and/or 

pulmonary edema. 

 

Dictated by:  SOPHIA MAI M.D. on 8/08/2018 at 11:29     

Electronically approved by:  SOPHIA MAI M.D. on 8/08/2018 at 11:29

## 2018-08-09 VITALS — SYSTOLIC BLOOD PRESSURE: 139 MMHG | DIASTOLIC BLOOD PRESSURE: 87 MMHG

## 2018-08-09 VITALS — DIASTOLIC BLOOD PRESSURE: 76 MMHG | SYSTOLIC BLOOD PRESSURE: 120 MMHG

## 2018-08-09 VITALS — SYSTOLIC BLOOD PRESSURE: 120 MMHG | DIASTOLIC BLOOD PRESSURE: 76 MMHG

## 2018-08-09 VITALS — SYSTOLIC BLOOD PRESSURE: 124 MMHG | DIASTOLIC BLOOD PRESSURE: 83 MMHG

## 2018-08-09 VITALS — SYSTOLIC BLOOD PRESSURE: 141 MMHG | DIASTOLIC BLOOD PRESSURE: 80 MMHG

## 2018-08-09 VITALS — SYSTOLIC BLOOD PRESSURE: 126 MMHG | DIASTOLIC BLOOD PRESSURE: 79 MMHG

## 2018-08-09 VITALS — DIASTOLIC BLOOD PRESSURE: 78 MMHG | SYSTOLIC BLOOD PRESSURE: 128 MMHG

## 2018-08-09 VITALS — SYSTOLIC BLOOD PRESSURE: 128 MMHG | DIASTOLIC BLOOD PRESSURE: 80 MMHG

## 2018-08-09 VITALS — DIASTOLIC BLOOD PRESSURE: 80 MMHG | SYSTOLIC BLOOD PRESSURE: 141 MMHG

## 2018-08-09 VITALS — SYSTOLIC BLOOD PRESSURE: 125 MMHG | DIASTOLIC BLOOD PRESSURE: 79 MMHG

## 2018-08-09 VITALS — SYSTOLIC BLOOD PRESSURE: 119 MMHG | DIASTOLIC BLOOD PRESSURE: 73 MMHG

## 2018-08-09 VITALS — DIASTOLIC BLOOD PRESSURE: 83 MMHG | SYSTOLIC BLOOD PRESSURE: 134 MMHG

## 2018-08-09 VITALS — SYSTOLIC BLOOD PRESSURE: 137 MMHG | DIASTOLIC BLOOD PRESSURE: 87 MMHG

## 2018-08-09 VITALS — SYSTOLIC BLOOD PRESSURE: 166 MMHG | DIASTOLIC BLOOD PRESSURE: 100 MMHG

## 2018-08-09 VITALS — DIASTOLIC BLOOD PRESSURE: 79 MMHG | SYSTOLIC BLOOD PRESSURE: 143 MMHG

## 2018-08-09 VITALS — SYSTOLIC BLOOD PRESSURE: 136 MMHG | DIASTOLIC BLOOD PRESSURE: 83 MMHG

## 2018-08-09 VITALS — SYSTOLIC BLOOD PRESSURE: 124 MMHG | DIASTOLIC BLOOD PRESSURE: 77 MMHG

## 2018-08-09 VITALS — SYSTOLIC BLOOD PRESSURE: 156 MMHG | DIASTOLIC BLOOD PRESSURE: 95 MMHG

## 2018-08-09 VITALS — DIASTOLIC BLOOD PRESSURE: 82 MMHG | SYSTOLIC BLOOD PRESSURE: 130 MMHG

## 2018-08-09 VITALS — SYSTOLIC BLOOD PRESSURE: 143 MMHG | DIASTOLIC BLOOD PRESSURE: 86 MMHG

## 2018-08-09 VITALS — SYSTOLIC BLOOD PRESSURE: 130 MMHG | DIASTOLIC BLOOD PRESSURE: 82 MMHG

## 2018-08-09 VITALS — SYSTOLIC BLOOD PRESSURE: 133 MMHG | DIASTOLIC BLOOD PRESSURE: 81 MMHG

## 2018-08-09 VITALS — DIASTOLIC BLOOD PRESSURE: 82 MMHG | SYSTOLIC BLOOD PRESSURE: 139 MMHG

## 2018-08-09 VITALS — SYSTOLIC BLOOD PRESSURE: 120 MMHG | DIASTOLIC BLOOD PRESSURE: 73 MMHG

## 2018-08-09 VITALS — DIASTOLIC BLOOD PRESSURE: 71 MMHG | SYSTOLIC BLOOD PRESSURE: 113 MMHG

## 2018-08-09 VITALS — SYSTOLIC BLOOD PRESSURE: 130 MMHG | DIASTOLIC BLOOD PRESSURE: 79 MMHG

## 2018-08-09 VITALS — SYSTOLIC BLOOD PRESSURE: 128 MMHG | DIASTOLIC BLOOD PRESSURE: 78 MMHG

## 2018-08-09 VITALS — SYSTOLIC BLOOD PRESSURE: 139 MMHG | DIASTOLIC BLOOD PRESSURE: 82 MMHG

## 2018-08-09 VITALS — SYSTOLIC BLOOD PRESSURE: 124 MMHG | DIASTOLIC BLOOD PRESSURE: 72 MMHG

## 2018-08-09 VITALS — DIASTOLIC BLOOD PRESSURE: 93 MMHG | SYSTOLIC BLOOD PRESSURE: 160 MMHG

## 2018-08-09 VITALS — SYSTOLIC BLOOD PRESSURE: 125 MMHG | DIASTOLIC BLOOD PRESSURE: 77 MMHG

## 2018-08-09 VITALS — DIASTOLIC BLOOD PRESSURE: 81 MMHG | SYSTOLIC BLOOD PRESSURE: 123 MMHG

## 2018-08-09 VITALS — SYSTOLIC BLOOD PRESSURE: 120 MMHG | DIASTOLIC BLOOD PRESSURE: 75 MMHG

## 2018-08-09 VITALS — DIASTOLIC BLOOD PRESSURE: 80 MMHG | SYSTOLIC BLOOD PRESSURE: 129 MMHG

## 2018-08-09 VITALS — SYSTOLIC BLOOD PRESSURE: 138 MMHG | DIASTOLIC BLOOD PRESSURE: 87 MMHG

## 2018-08-09 VITALS — SYSTOLIC BLOOD PRESSURE: 156 MMHG | DIASTOLIC BLOOD PRESSURE: 119 MMHG

## 2018-08-09 VITALS — SYSTOLIC BLOOD PRESSURE: 113 MMHG | DIASTOLIC BLOOD PRESSURE: 68 MMHG

## 2018-08-09 VITALS — DIASTOLIC BLOOD PRESSURE: 82 MMHG | SYSTOLIC BLOOD PRESSURE: 128 MMHG

## 2018-08-09 VITALS — SYSTOLIC BLOOD PRESSURE: 150 MMHG | DIASTOLIC BLOOD PRESSURE: 92 MMHG

## 2018-08-09 VITALS — DIASTOLIC BLOOD PRESSURE: 82 MMHG | SYSTOLIC BLOOD PRESSURE: 131 MMHG

## 2018-08-09 VITALS — DIASTOLIC BLOOD PRESSURE: 73 MMHG | SYSTOLIC BLOOD PRESSURE: 121 MMHG

## 2018-08-09 VITALS — SYSTOLIC BLOOD PRESSURE: 132 MMHG | DIASTOLIC BLOOD PRESSURE: 79 MMHG

## 2018-08-09 VITALS — SYSTOLIC BLOOD PRESSURE: 129 MMHG | DIASTOLIC BLOOD PRESSURE: 74 MMHG

## 2018-08-09 VITALS — SYSTOLIC BLOOD PRESSURE: 135 MMHG | DIASTOLIC BLOOD PRESSURE: 85 MMHG

## 2018-08-09 VITALS — SYSTOLIC BLOOD PRESSURE: 124 MMHG | DIASTOLIC BLOOD PRESSURE: 85 MMHG

## 2018-08-09 VITALS — SYSTOLIC BLOOD PRESSURE: 132 MMHG | DIASTOLIC BLOOD PRESSURE: 86 MMHG

## 2018-08-09 LAB
ANION GAP SERPL CALC-SCNC: 13.5 MMOL/L (ref 8–16)
ANISOCYTOSIS BLD QL SMEAR: SLIGHT
BASOPHILS # BLD AUTO: 0.1 10*3/UL (ref 0–0.1)
BASOPHILS NFR BLD AUTO: 0.4 % (ref 0–1)
BUN SERPL-MCNC: 25 MG/DL (ref 7–26)
BUN/CREAT SERPL: 41 (ref 6–25)
CALCIUM SERPL-MCNC: 8.3 MG/DL (ref 8.4–10.2)
CHLORIDE SERPL-SCNC: 103 MMOL/L (ref 98–107)
CO2 SERPL-SCNC: 28 MMOL/L (ref 22–29)
DEPRECATED NEUTROPHILS # BLD AUTO: 18.3 10*3/UL (ref 2.1–6.9)
EGFRCR SERPLBLD CKD-EPI 2021: > 60 ML/MIN (ref 60–?)
EOSINOPHIL # BLD AUTO: 0.1 10*3/UL (ref 0–0.4)
EOSINOPHIL NFR BLD AUTO: 0.3 % (ref 0–6)
ERYTHROCYTE [DISTWIDTH] IN CORD BLOOD: 15.9 % (ref 11.7–14.4)
GLUCOSE SERPLBLD-MCNC: 123 MG/DL (ref 74–118)
HCT VFR BLD AUTO: 26.9 % (ref 34.2–44.1)
HGB BLD-MCNC: 9.2 G/DL (ref 12–16)
HYPOCHROMIA BLD QL SMEAR: SLIGHT
LYMPHOCYTES # BLD: 0.8 10*3/UL (ref 1–3.2)
LYMPHOCYTES NFR BLD AUTO: 3.8 % (ref 18–39.1)
LYMPHOCYTES NFR BLD MANUAL: 3 % (ref 19–48)
MAGNESIUM SERPL-MCNC: 1.8 MG/DL (ref 1.3–2.1)
MCH RBC QN AUTO: 32.4 PG (ref 28–32)
MCHC RBC AUTO-ENTMCNC: 34.2 G/DL (ref 31–35)
MCV RBC AUTO: 94.7 FL (ref 81–99)
MONOCYTES # BLD AUTO: 0.3 10*3/UL (ref 0.2–0.8)
MONOCYTES NFR BLD AUTO: 1.4 % (ref 4.4–11.3)
MONOCYTES NFR BLD MANUAL: 2 % (ref 3.4–9)
NEUTS SEG NFR BLD AUTO: 87.4 % (ref 38.7–80)
NEUTS SEG NFR BLD MANUAL: 94 % (ref 40–74)
PLAT MORPH BLD: (no result)
PLATELET # BLD AUTO: 113 X10E3/UL (ref 140–360)
PLATELET # BLD EST: (no result) 10*3/UL
POIKILOCYTOSIS BLD QL SMEAR: SLIGHT
POTASSIUM SERPL-SCNC: 3.5 MMOL/L (ref 3.5–5.1)
RBC # BLD AUTO: 2.84 X10E6/UL (ref 3.6–5.1)
RBC MORPH BLD: NORMAL
SODIUM SERPL-SCNC: 141 MMOL/L (ref 136–145)

## 2018-08-09 RX ADMIN — TAZOBACTAM SODIUM AND PIPERACILLIN SODIUM SCH MLS/HR: 375; 3 INJECTION, SOLUTION INTRAVENOUS at 20:58

## 2018-08-09 RX ADMIN — NICOTINE SCH MG: 7 PATCH, EXTENDED RELEASE TRANSDERMAL at 09:00

## 2018-08-09 RX ADMIN — Medication SCH ML: at 23:00

## 2018-08-09 RX ADMIN — Medication SCH ML: at 03:00

## 2018-08-09 RX ADMIN — SODIUM CHLORIDE SCH MG: 900 INJECTION INTRAVENOUS at 09:00

## 2018-08-09 RX ADMIN — Medication SCH ML: at 20:00

## 2018-08-09 RX ADMIN — Medication SCH ML: at 11:00

## 2018-08-09 RX ADMIN — Medication SCH MLS/HR: at 17:00

## 2018-08-09 RX ADMIN — TAZOBACTAM SODIUM AND PIPERACILLIN SODIUM SCH MLS/HR: 375; 3 INJECTION, SOLUTION INTRAVENOUS at 04:25

## 2018-08-09 RX ADMIN — SODIUM CHLORIDE SCH MG: 900 INJECTION INTRAVENOUS at 20:58

## 2018-08-09 RX ADMIN — PROPOFOL PRN MLS/HR: 10 INJECTION, EMULSION INTRAVENOUS at 17:55

## 2018-08-09 RX ADMIN — TAZOBACTAM SODIUM AND PIPERACILLIN SODIUM SCH MLS/HR: 375; 3 INJECTION, SOLUTION INTRAVENOUS at 16:00

## 2018-08-09 RX ADMIN — SODIUM CHLORIDE SCH MLS/HR: 900 INJECTION INTRAVENOUS at 09:00

## 2018-08-09 RX ADMIN — PROPOFOL PRN MLS/HR: 10 INJECTION, EMULSION INTRAVENOUS at 10:35

## 2018-08-09 RX ADMIN — Medication SCH ML: at 15:00

## 2018-08-09 RX ADMIN — VANCOMYCIN HYDROCHLORIDE SCH MLS/HR: 1 INJECTION, SOLUTION INTRAVENOUS at 09:00

## 2018-08-09 RX ADMIN — Medication SCH ML: at 07:50

## 2018-08-09 NOTE — DIAGNOSTIC IMAGING REPORT
EXAM: CHEST SINGLE (PORTABLE), AP 1 view

INDICATION: Intubated

COMPARISON: AP view of the chest August 8, 2018



FINDINGS:

LINES/TUBES: Stable endotracheal tube, right PICC and nasal/orogastric tube.



LUNGS: Persistent diffuse bilateral airspace opacities. 



PLEURA: Suspected layering pleural effusions bilaterally.



HEART AND MEDIASTINUM: Normal size and contour.



BONES AND SOFT TISSUES: No acute findings. 



IMPRESSION:

No interval change in appearance of the chest.







Signed by: Dr. Galilea Gagnon M.D. on 8/9/2018 6:29 AM

## 2018-08-10 VITALS — SYSTOLIC BLOOD PRESSURE: 120 MMHG | DIASTOLIC BLOOD PRESSURE: 86 MMHG

## 2018-08-10 VITALS — SYSTOLIC BLOOD PRESSURE: 124 MMHG | DIASTOLIC BLOOD PRESSURE: 71 MMHG

## 2018-08-10 VITALS — SYSTOLIC BLOOD PRESSURE: 121 MMHG | DIASTOLIC BLOOD PRESSURE: 72 MMHG

## 2018-08-10 VITALS — DIASTOLIC BLOOD PRESSURE: 70 MMHG | SYSTOLIC BLOOD PRESSURE: 112 MMHG

## 2018-08-10 VITALS — DIASTOLIC BLOOD PRESSURE: 75 MMHG | SYSTOLIC BLOOD PRESSURE: 124 MMHG

## 2018-08-10 VITALS — SYSTOLIC BLOOD PRESSURE: 117 MMHG | DIASTOLIC BLOOD PRESSURE: 74 MMHG

## 2018-08-10 VITALS — DIASTOLIC BLOOD PRESSURE: 78 MMHG | SYSTOLIC BLOOD PRESSURE: 122 MMHG

## 2018-08-10 VITALS — DIASTOLIC BLOOD PRESSURE: 65 MMHG | SYSTOLIC BLOOD PRESSURE: 104 MMHG

## 2018-08-10 VITALS — DIASTOLIC BLOOD PRESSURE: 72 MMHG | SYSTOLIC BLOOD PRESSURE: 114 MMHG

## 2018-08-10 VITALS — DIASTOLIC BLOOD PRESSURE: 75 MMHG | SYSTOLIC BLOOD PRESSURE: 120 MMHG

## 2018-08-10 VITALS — SYSTOLIC BLOOD PRESSURE: 131 MMHG | DIASTOLIC BLOOD PRESSURE: 77 MMHG

## 2018-08-10 VITALS — DIASTOLIC BLOOD PRESSURE: 67 MMHG | SYSTOLIC BLOOD PRESSURE: 108 MMHG

## 2018-08-10 VITALS — DIASTOLIC BLOOD PRESSURE: 69 MMHG | SYSTOLIC BLOOD PRESSURE: 119 MMHG

## 2018-08-10 VITALS — DIASTOLIC BLOOD PRESSURE: 66 MMHG | SYSTOLIC BLOOD PRESSURE: 108 MMHG

## 2018-08-10 VITALS — SYSTOLIC BLOOD PRESSURE: 130 MMHG | DIASTOLIC BLOOD PRESSURE: 75 MMHG

## 2018-08-10 VITALS — DIASTOLIC BLOOD PRESSURE: 82 MMHG | SYSTOLIC BLOOD PRESSURE: 124 MMHG

## 2018-08-10 VITALS — SYSTOLIC BLOOD PRESSURE: 87 MMHG | DIASTOLIC BLOOD PRESSURE: 58 MMHG

## 2018-08-10 VITALS — SYSTOLIC BLOOD PRESSURE: 126 MMHG | DIASTOLIC BLOOD PRESSURE: 76 MMHG

## 2018-08-10 VITALS — DIASTOLIC BLOOD PRESSURE: 83 MMHG | SYSTOLIC BLOOD PRESSURE: 129 MMHG

## 2018-08-10 VITALS — SYSTOLIC BLOOD PRESSURE: 119 MMHG | DIASTOLIC BLOOD PRESSURE: 75 MMHG

## 2018-08-10 VITALS — SYSTOLIC BLOOD PRESSURE: 91 MMHG | DIASTOLIC BLOOD PRESSURE: 56 MMHG

## 2018-08-10 VITALS — DIASTOLIC BLOOD PRESSURE: 87 MMHG | SYSTOLIC BLOOD PRESSURE: 133 MMHG

## 2018-08-10 VITALS — SYSTOLIC BLOOD PRESSURE: 117 MMHG | DIASTOLIC BLOOD PRESSURE: 68 MMHG

## 2018-08-10 VITALS — DIASTOLIC BLOOD PRESSURE: 75 MMHG | SYSTOLIC BLOOD PRESSURE: 130 MMHG

## 2018-08-10 VITALS — DIASTOLIC BLOOD PRESSURE: 76 MMHG | SYSTOLIC BLOOD PRESSURE: 113 MMHG

## 2018-08-10 VITALS — DIASTOLIC BLOOD PRESSURE: 76 MMHG | SYSTOLIC BLOOD PRESSURE: 123 MMHG

## 2018-08-10 VITALS — DIASTOLIC BLOOD PRESSURE: 73 MMHG | SYSTOLIC BLOOD PRESSURE: 112 MMHG

## 2018-08-10 VITALS — SYSTOLIC BLOOD PRESSURE: 88 MMHG | DIASTOLIC BLOOD PRESSURE: 55 MMHG

## 2018-08-10 VITALS — SYSTOLIC BLOOD PRESSURE: 110 MMHG | DIASTOLIC BLOOD PRESSURE: 68 MMHG

## 2018-08-10 VITALS — SYSTOLIC BLOOD PRESSURE: 150 MMHG | DIASTOLIC BLOOD PRESSURE: 80 MMHG

## 2018-08-10 VITALS — SYSTOLIC BLOOD PRESSURE: 153 MMHG | DIASTOLIC BLOOD PRESSURE: 92 MMHG

## 2018-08-10 VITALS — DIASTOLIC BLOOD PRESSURE: 79 MMHG | SYSTOLIC BLOOD PRESSURE: 140 MMHG

## 2018-08-10 VITALS — DIASTOLIC BLOOD PRESSURE: 79 MMHG | SYSTOLIC BLOOD PRESSURE: 136 MMHG

## 2018-08-10 VITALS — DIASTOLIC BLOOD PRESSURE: 60 MMHG | SYSTOLIC BLOOD PRESSURE: 95 MMHG

## 2018-08-10 VITALS — DIASTOLIC BLOOD PRESSURE: 56 MMHG | SYSTOLIC BLOOD PRESSURE: 95 MMHG

## 2018-08-10 VITALS — DIASTOLIC BLOOD PRESSURE: 101 MMHG | SYSTOLIC BLOOD PRESSURE: 144 MMHG

## 2018-08-10 VITALS — DIASTOLIC BLOOD PRESSURE: 95 MMHG | SYSTOLIC BLOOD PRESSURE: 148 MMHG

## 2018-08-10 VITALS — DIASTOLIC BLOOD PRESSURE: 74 MMHG | SYSTOLIC BLOOD PRESSURE: 125 MMHG

## 2018-08-10 VITALS — DIASTOLIC BLOOD PRESSURE: 63 MMHG | SYSTOLIC BLOOD PRESSURE: 105 MMHG

## 2018-08-10 VITALS — SYSTOLIC BLOOD PRESSURE: 112 MMHG | DIASTOLIC BLOOD PRESSURE: 67 MMHG

## 2018-08-10 VITALS — DIASTOLIC BLOOD PRESSURE: 73 MMHG | SYSTOLIC BLOOD PRESSURE: 121 MMHG

## 2018-08-10 VITALS — SYSTOLIC BLOOD PRESSURE: 122 MMHG | DIASTOLIC BLOOD PRESSURE: 75 MMHG

## 2018-08-10 VITALS — SYSTOLIC BLOOD PRESSURE: 117 MMHG | DIASTOLIC BLOOD PRESSURE: 71 MMHG

## 2018-08-10 VITALS — DIASTOLIC BLOOD PRESSURE: 66 MMHG | SYSTOLIC BLOOD PRESSURE: 104 MMHG

## 2018-08-10 VITALS — SYSTOLIC BLOOD PRESSURE: 134 MMHG | DIASTOLIC BLOOD PRESSURE: 83 MMHG

## 2018-08-10 VITALS — DIASTOLIC BLOOD PRESSURE: 56 MMHG | SYSTOLIC BLOOD PRESSURE: 88 MMHG

## 2018-08-10 VITALS — SYSTOLIC BLOOD PRESSURE: 103 MMHG | DIASTOLIC BLOOD PRESSURE: 67 MMHG

## 2018-08-10 VITALS — DIASTOLIC BLOOD PRESSURE: 70 MMHG | SYSTOLIC BLOOD PRESSURE: 116 MMHG

## 2018-08-10 VITALS — SYSTOLIC BLOOD PRESSURE: 129 MMHG | DIASTOLIC BLOOD PRESSURE: 76 MMHG

## 2018-08-10 VITALS — SYSTOLIC BLOOD PRESSURE: 130 MMHG | DIASTOLIC BLOOD PRESSURE: 83 MMHG

## 2018-08-10 VITALS — SYSTOLIC BLOOD PRESSURE: 122 MMHG | DIASTOLIC BLOOD PRESSURE: 73 MMHG

## 2018-08-10 VITALS — SYSTOLIC BLOOD PRESSURE: 107 MMHG | DIASTOLIC BLOOD PRESSURE: 74 MMHG

## 2018-08-10 VITALS — DIASTOLIC BLOOD PRESSURE: 71 MMHG | SYSTOLIC BLOOD PRESSURE: 107 MMHG

## 2018-08-10 VITALS — SYSTOLIC BLOOD PRESSURE: 134 MMHG | DIASTOLIC BLOOD PRESSURE: 86 MMHG

## 2018-08-10 VITALS — SYSTOLIC BLOOD PRESSURE: 123 MMHG | DIASTOLIC BLOOD PRESSURE: 75 MMHG

## 2018-08-10 VITALS — DIASTOLIC BLOOD PRESSURE: 79 MMHG | SYSTOLIC BLOOD PRESSURE: 130 MMHG

## 2018-08-10 VITALS — DIASTOLIC BLOOD PRESSURE: 76 MMHG | SYSTOLIC BLOOD PRESSURE: 106 MMHG

## 2018-08-10 VITALS — DIASTOLIC BLOOD PRESSURE: 58 MMHG | SYSTOLIC BLOOD PRESSURE: 96 MMHG

## 2018-08-10 VITALS — DIASTOLIC BLOOD PRESSURE: 78 MMHG | SYSTOLIC BLOOD PRESSURE: 120 MMHG

## 2018-08-10 VITALS — DIASTOLIC BLOOD PRESSURE: 70 MMHG | SYSTOLIC BLOOD PRESSURE: 124 MMHG

## 2018-08-10 VITALS — SYSTOLIC BLOOD PRESSURE: 107 MMHG | DIASTOLIC BLOOD PRESSURE: 77 MMHG

## 2018-08-10 VITALS — DIASTOLIC BLOOD PRESSURE: 69 MMHG | SYSTOLIC BLOOD PRESSURE: 104 MMHG

## 2018-08-10 VITALS — SYSTOLIC BLOOD PRESSURE: 112 MMHG | DIASTOLIC BLOOD PRESSURE: 72 MMHG

## 2018-08-10 VITALS — SYSTOLIC BLOOD PRESSURE: 108 MMHG | DIASTOLIC BLOOD PRESSURE: 80 MMHG

## 2018-08-10 VITALS — DIASTOLIC BLOOD PRESSURE: 74 MMHG | SYSTOLIC BLOOD PRESSURE: 118 MMHG

## 2018-08-10 LAB
ANISOCYTOSIS BLD QL SMEAR: SLIGHT
BASOPHILS # BLD AUTO: 0.2 10*3/UL (ref 0–0.1)
BASOPHILS NFR BLD AUTO: 0.6 % (ref 0–1)
DEPRECATED NEUTROPHILS # BLD AUTO: 19.1 10*3/UL (ref 2.1–6.9)
EOSINOPHIL # BLD AUTO: 0.2 10*3/UL (ref 0–0.4)
EOSINOPHIL NFR BLD AUTO: 0.8 % (ref 0–6)
ERYTHROCYTE [DISTWIDTH] IN CORD BLOOD: 16 % (ref 11.7–14.4)
HCT VFR BLD AUTO: 28.6 % (ref 34.2–44.1)
HGB BLD-MCNC: 9.9 G/DL (ref 12–16)
LYMPHOCYTES # BLD: 1.2 10*3/UL (ref 1–3.2)
LYMPHOCYTES NFR BLD AUTO: 5 % (ref 18–39.1)
LYMPHOCYTES NFR BLD MANUAL: 6 % (ref 19–48)
MCH RBC QN AUTO: 32.8 PG (ref 28–32)
MCHC RBC AUTO-ENTMCNC: 34.6 G/DL (ref 31–35)
MCV RBC AUTO: 94.7 FL (ref 81–99)
MONOCYTES # BLD AUTO: 0.4 10*3/UL (ref 0.2–0.8)
MONOCYTES NFR BLD AUTO: 1.7 % (ref 4.4–11.3)
MONOCYTES NFR BLD MANUAL: 3 % (ref 3.4–9)
NEUTS SEG NFR BLD AUTO: 80.5 % (ref 38.7–80)
NEUTS SEG NFR BLD MANUAL: 91 % (ref 40–74)
PLAT MORPH BLD: NORMAL
PLATELET # BLD AUTO: 163 X10E3/UL (ref 140–360)
PLATELET # BLD EST: ADEQUATE 10*3/UL
POLYCHROMASIA BLD QL SMEAR: (no result)
RBC # BLD AUTO: 3.02 X10E6/UL (ref 3.6–5.1)

## 2018-08-10 RX ADMIN — SODIUM CHLORIDE PRN MLS/HR: 900 INJECTION, SOLUTION INTRAVENOUS at 08:28

## 2018-08-10 RX ADMIN — Medication SCH MLS/HR: at 17:00

## 2018-08-10 RX ADMIN — TAZOBACTAM SODIUM AND PIPERACILLIN SODIUM SCH MLS/HR: 375; 3 INJECTION, SOLUTION INTRAVENOUS at 05:12

## 2018-08-10 RX ADMIN — TAZOBACTAM SODIUM AND PIPERACILLIN SODIUM SCH MLS/HR: 375; 3 INJECTION, SOLUTION INTRAVENOUS at 21:04

## 2018-08-10 RX ADMIN — SODIUM CHLORIDE SCH MG: 900 INJECTION INTRAVENOUS at 21:04

## 2018-08-10 RX ADMIN — NICOTINE SCH MG: 7 PATCH, EXTENDED RELEASE TRANSDERMAL at 09:13

## 2018-08-10 RX ADMIN — Medication SCH ML: at 15:00

## 2018-08-10 RX ADMIN — Medication SCH ML: at 19:15

## 2018-08-10 RX ADMIN — SODIUM CHLORIDE SCH MG: 900 INJECTION INTRAVENOUS at 09:13

## 2018-08-10 RX ADMIN — Medication SCH ML: at 11:00

## 2018-08-10 RX ADMIN — SODIUM CHLORIDE PRN MLS/HR: 900 INJECTION, SOLUTION INTRAVENOUS at 13:50

## 2018-08-10 RX ADMIN — Medication SCH ML: at 07:40

## 2018-08-10 RX ADMIN — TAZOBACTAM SODIUM AND PIPERACILLIN SODIUM SCH MLS/HR: 375; 3 INJECTION, SOLUTION INTRAVENOUS at 12:30

## 2018-08-10 RX ADMIN — SODIUM CHLORIDE SCH MLS/HR: 900 INJECTION INTRAVENOUS at 09:13

## 2018-08-10 RX ADMIN — VANCOMYCIN HYDROCHLORIDE SCH MLS/HR: 1 INJECTION, SOLUTION INTRAVENOUS at 10:31

## 2018-08-10 RX ADMIN — Medication SCH ML: at 03:00

## 2018-08-10 RX ADMIN — PROPOFOL PRN MLS/HR: 10 INJECTION, EMULSION INTRAVENOUS at 01:32

## 2018-08-10 RX ADMIN — Medication SCH ML: at 23:00

## 2018-08-10 RX ADMIN — Medication SCH MLS/HR: at 21:04

## 2018-08-10 NOTE — PROGRESS NOTE
DATE:  August 10, 2018 



SUBJECTIVE:  Patient had a tracheostomy placed today.  Eyes open.  NG tube 

is in place.



REVIEW OF SYSTEMS:  Unobtainable.  



MEDICATIONS:  Reviewed MAR.  



PHYSICAL EXAMINATION   

VITAL SIGNS:  Temperature 100.7, pulse ranging from 108  to 106, 

respirations 35, blood pressure 119/69, oxygen saturation 96% on current 

ventilator setting. 

GENERAL:  Eyes open.  Status post tracheostomy.  Oral mucosa is moist. 

CVS:  S1 and S2 regular but tachy with 3/6 blow murmur at the apex. 

LUNGS:  Bilaterally grossly clear. 

ABDOMEN:  Soft, nondistended, nontender.  No palpable mass or hernia.  

Shifting dullness not digitally appreciable. 

EXTREMITIES:  Warm.  No leg edema.  



LABS:  WBC gone up to 23.78, hemoglobin 9.9, hematocrit 28.6, MCV 94.7, 

platelet count 163,000.  Sodium 141, potassium 3.5, chloride 103, bicarb 

28, BUN 25, creatinine  0.61. 



Ultrasound of the abdomen done on 08/03/18 showed trace perihepatic and 

pericholecystic free fluid, partially visualized trace right pleural 

effusion.  No kamilah ascites.



PROBLEM LIST

1. Alcoholic liver cirrhosis.

2. Aspiration pneumonia, bacteremia secondary to Streptococcus.  Patient 

is on broad-spectrum intravenous antibiotic.  Being followed by ID 

service.  

3. Alcohol dependence.  

4. Respiratory failure status post tracheostomy.









PLAN:  I had a telephonic discussion with Dr. Barker.  He thinks that we 

should proceed with upper endoscopy and percutaneous endoscopy gastrostomy. 

 There is very minimal fluid around the liver.  There is no kamilah ascites.  

Therefore, I will proceed with upper endoscopy and PEG placement on Monday. 

 In the interim, the patient can continue nasogastric feeding.  Nasogastric 

feeding is to be withheld on Sunday at midnight.  If patient goes into 

hepatic decompression and ascites gets worse,  then there will be a chance 

of leak from the PEG site.  However, if that happens, Dr. Barker said in 

that situation he will be more than happy to put surgical jejunostomy. 





DD:  08/10/2018 17:20

DT:  08/10/2018 17:26

Job#:  [id number]

## 2018-08-10 NOTE — OPERATIVE REPORT
DATE OF PROCEDURE:  August 10, 2018 

 

PREOPERATIVE DIAGNOSIS:  Respiratory failure.  



POSTOPERATIVE DIAGNOSIS:  Respiratory failure.  



TITLE OF PROCEDURE:  Tracheostomy with #8 Shiley LPC tracheostomy tube.



ANESTHESIA:  General endotracheal.



ESTIMATED BLOOD LOSS:  Less than 10 mL.  



COMPLICATIONS:  None.



OPERATIVE FINDINGS:  Normal neck anatomy.  



OPERATIVE INDICATIONS:  This 47-year-old woman presents with a history of 

respiratory failure which has necessitated prolonged ventilator support.  

It is anticipated she will continue to need ventilator support and 

subsequent pulmonary toilet.  The risks, benefits, and alternatives to 

surgical intervention were discussed in detail with the patient's family 

and they gave their informed consent to have this procedure performed.



NARRATIVE REPORT:  After first obtaining adequate general anesthesia 

through the previously placed endotracheal tube, the area of incision was 

infiltrated with 1% lidocaine with epinephrine 1:100,000, a total of 8 mL 

was used.  The patient was then prepped and draped in the usual fashion.  A 

3-cm incision was then made 2 cm above the sternal notch with a 15-blade 

and carried through the subcutaneous tissues with a Bovie coagulator.  The 

strap muscles were identified and divided in the midline with a Haylie 

hemostat and reflected laterally with the Gelpi retractor.  The thyroid 

isthmus was transected using the Bovie coagulator.  Trachea was identified. 

 Cricoid hook was inserted.  Incision was then made between the 2nd and 3rd 

tracheal ring with #15 blade.  An inferiorly based trapezoidal flap then 

created using curved Hilton scissors.  The endotracheal tube was partially 

withdrawn and a #8 Shiley LPC tracheostomy tube easily inserted through the 

tracheostomy site.  CO2 was confirmed in exhaled gases.  The retractors 

were removed and the tracheostomy tube was then sutured in place with 2-0 

nylon.  Trach ties were applied and the patient was in satisfactory 

condition at the termination of the procedure.  









DD:  08/10/2018 14:42

DT:  08/10/2018 14:44

Job#:  [id number]

## 2018-08-10 NOTE — CONSULTATION
DATE OF CONSULTATION:  August 10, 2018 



HISTORY OF PRESENT ILLNESS:  Patient is a 48-year-old female with pneumonia 

respiratory failure.  There is also history of cirrhosis of the liver.  She 

is in the ICU on a ventilator, currently being fed via nasogastric tube, 

but patient needs access to her GI tract enteral feedings.  

Gastroenterology has seen the patient.  They were reluctant to do the 

percutaneous endoscopic gastrostomy because of her cirrhosis of liver.  Her 

ultrasound reveals only trace ascites and CT scan did not comment on 

ascites.  



EXAM  

GENERAL:  The patient is alert and intubated. 

ABDOMEN:  Soft.  There is no definite clinical ascites.  Abdomen is 

nontender. 



ASSESSMENT:  A 48-year-old female with cirrhosis, but only trace ascites 

seen on ultrasound.  I think it would be best to proceed with percutaneous 

endoscopic gastrostomy by gastrointestinal.  If this is not successful, 

then open gastrostomy could be done.  However, I think that would be best 

in this situation.  I have discussed with Dr. Tello and he says he will plan 

to proceed with this procedure.  I will be available if any complications 

develop or if more invasive type of enteral access is needed.  



Thank you for asking me to see Ms. Hicks.









DD:  08/10/2018 06:47

DT:  08/10/2018 06:58

Job#:  [id number]

## 2018-08-10 NOTE — PROGRESS NOTE
DATE:  August 09, 2018 



SUBJECTIVE:  Patient remains intubated.  Despite sedation  her eyes are 

open.  She is likely going to get a tracheostomy on 08/10/2018.



REVIEW OF SYSTEMS:  Unobtainable. 



MEDICATIONS:  Reviewed.  See MAR.  



PHYSICAL EXAMINATION  

VITAL SIGNS:  Temperature 99.7, pulse 103, respirations 25, blood pressure 

130/79, oxygen saturation 95% on current ventilator setting. 

GENERAL:  Intubated on ventilator.  Nasogastric tube in place.  Eyes open.  

Oral mucosa is moist.  Anicteric sclerae. 

CVS:  S1 and S2 regular. 

LUNGS:  Bilaterally grossly clear. 

ABDOMEN:  Soft, nondistended, nontender.  No palpable shifting dullness.  

Bowel sounds present. 

EXTREMITIES:  Warm.  No leg edema. 



LABS:  WBC has gone up to 20.99 from 15.76.  Hemoglobin 9.2 from 11.8.  

Hematocrit 26.9.  MCV 94.7, platelet count under 13,000.  Sodium 14,  

potassium 3.5, chloride 103, bicarb 28,  BUN 25, creatinine 0.61.  Glucose 

123.



IMPRESSION:  

1. Patient will need feeding gastrostomy after tracheostomy.  

2. Alcoholic liver cirrhosis, seems to be fairly decompensated at this 

time.









PLAN:   Will recommend to have a surgery consult to see if surgical 

gastrostomy tube can be placed.  Patient is at the risk of leak from PEG 

tube due to underlying mild ascites.  Patient otherwise does not have any 

contraindications for upper endoscopy or PEG tube placement.  











DD:  08/10/2018 16:30

DT:  08/10/2018 17:19

Job#:  [id number]

## 2018-08-11 VITALS — DIASTOLIC BLOOD PRESSURE: 57 MMHG | SYSTOLIC BLOOD PRESSURE: 89 MMHG

## 2018-08-11 VITALS — SYSTOLIC BLOOD PRESSURE: 91 MMHG | DIASTOLIC BLOOD PRESSURE: 60 MMHG

## 2018-08-11 VITALS — DIASTOLIC BLOOD PRESSURE: 63 MMHG | SYSTOLIC BLOOD PRESSURE: 93 MMHG

## 2018-08-11 VITALS — DIASTOLIC BLOOD PRESSURE: 61 MMHG | SYSTOLIC BLOOD PRESSURE: 85 MMHG

## 2018-08-11 VITALS — DIASTOLIC BLOOD PRESSURE: 69 MMHG | SYSTOLIC BLOOD PRESSURE: 102 MMHG

## 2018-08-11 VITALS — DIASTOLIC BLOOD PRESSURE: 62 MMHG | SYSTOLIC BLOOD PRESSURE: 88 MMHG

## 2018-08-11 VITALS — DIASTOLIC BLOOD PRESSURE: 60 MMHG | SYSTOLIC BLOOD PRESSURE: 91 MMHG

## 2018-08-11 VITALS — SYSTOLIC BLOOD PRESSURE: 98 MMHG | DIASTOLIC BLOOD PRESSURE: 64 MMHG

## 2018-08-11 VITALS — DIASTOLIC BLOOD PRESSURE: 71 MMHG | SYSTOLIC BLOOD PRESSURE: 108 MMHG

## 2018-08-11 VITALS — SYSTOLIC BLOOD PRESSURE: 93 MMHG | DIASTOLIC BLOOD PRESSURE: 60 MMHG

## 2018-08-11 VITALS — SYSTOLIC BLOOD PRESSURE: 88 MMHG | DIASTOLIC BLOOD PRESSURE: 62 MMHG

## 2018-08-11 VITALS — SYSTOLIC BLOOD PRESSURE: 99 MMHG | DIASTOLIC BLOOD PRESSURE: 60 MMHG

## 2018-08-11 VITALS — DIASTOLIC BLOOD PRESSURE: 61 MMHG | SYSTOLIC BLOOD PRESSURE: 94 MMHG

## 2018-08-11 VITALS — DIASTOLIC BLOOD PRESSURE: 72 MMHG | SYSTOLIC BLOOD PRESSURE: 102 MMHG

## 2018-08-11 VITALS — SYSTOLIC BLOOD PRESSURE: 85 MMHG | DIASTOLIC BLOOD PRESSURE: 61 MMHG

## 2018-08-11 VITALS — DIASTOLIC BLOOD PRESSURE: 65 MMHG | SYSTOLIC BLOOD PRESSURE: 100 MMHG

## 2018-08-11 VITALS — DIASTOLIC BLOOD PRESSURE: 57 MMHG | SYSTOLIC BLOOD PRESSURE: 94 MMHG

## 2018-08-11 VITALS — SYSTOLIC BLOOD PRESSURE: 96 MMHG | DIASTOLIC BLOOD PRESSURE: 61 MMHG

## 2018-08-11 VITALS — SYSTOLIC BLOOD PRESSURE: 92 MMHG | DIASTOLIC BLOOD PRESSURE: 61 MMHG

## 2018-08-11 VITALS — SYSTOLIC BLOOD PRESSURE: 90 MMHG | DIASTOLIC BLOOD PRESSURE: 61 MMHG

## 2018-08-11 VITALS — SYSTOLIC BLOOD PRESSURE: 87 MMHG | DIASTOLIC BLOOD PRESSURE: 54 MMHG

## 2018-08-11 VITALS — DIASTOLIC BLOOD PRESSURE: 59 MMHG | SYSTOLIC BLOOD PRESSURE: 91 MMHG

## 2018-08-11 VITALS — DIASTOLIC BLOOD PRESSURE: 75 MMHG | SYSTOLIC BLOOD PRESSURE: 112 MMHG

## 2018-08-11 VITALS — SYSTOLIC BLOOD PRESSURE: 95 MMHG | DIASTOLIC BLOOD PRESSURE: 57 MMHG

## 2018-08-11 VITALS — SYSTOLIC BLOOD PRESSURE: 103 MMHG | DIASTOLIC BLOOD PRESSURE: 72 MMHG

## 2018-08-11 VITALS — SYSTOLIC BLOOD PRESSURE: 116 MMHG | DIASTOLIC BLOOD PRESSURE: 77 MMHG

## 2018-08-11 VITALS — DIASTOLIC BLOOD PRESSURE: 76 MMHG | SYSTOLIC BLOOD PRESSURE: 112 MMHG

## 2018-08-11 VITALS — SYSTOLIC BLOOD PRESSURE: 94 MMHG | DIASTOLIC BLOOD PRESSURE: 57 MMHG

## 2018-08-11 VITALS — SYSTOLIC BLOOD PRESSURE: 107 MMHG | DIASTOLIC BLOOD PRESSURE: 71 MMHG

## 2018-08-11 VITALS — SYSTOLIC BLOOD PRESSURE: 95 MMHG | DIASTOLIC BLOOD PRESSURE: 66 MMHG

## 2018-08-11 VITALS — DIASTOLIC BLOOD PRESSURE: 64 MMHG | SYSTOLIC BLOOD PRESSURE: 101 MMHG

## 2018-08-11 VITALS — DIASTOLIC BLOOD PRESSURE: 68 MMHG | SYSTOLIC BLOOD PRESSURE: 105 MMHG

## 2018-08-11 VITALS — SYSTOLIC BLOOD PRESSURE: 91 MMHG | DIASTOLIC BLOOD PRESSURE: 55 MMHG

## 2018-08-11 VITALS — DIASTOLIC BLOOD PRESSURE: 60 MMHG | SYSTOLIC BLOOD PRESSURE: 92 MMHG

## 2018-08-11 VITALS — DIASTOLIC BLOOD PRESSURE: 69 MMHG | SYSTOLIC BLOOD PRESSURE: 99 MMHG

## 2018-08-11 VITALS — DIASTOLIC BLOOD PRESSURE: 70 MMHG | SYSTOLIC BLOOD PRESSURE: 110 MMHG

## 2018-08-11 VITALS — SYSTOLIC BLOOD PRESSURE: 105 MMHG | DIASTOLIC BLOOD PRESSURE: 85 MMHG

## 2018-08-11 VITALS — DIASTOLIC BLOOD PRESSURE: 62 MMHG | SYSTOLIC BLOOD PRESSURE: 89 MMHG

## 2018-08-11 VITALS — DIASTOLIC BLOOD PRESSURE: 65 MMHG | SYSTOLIC BLOOD PRESSURE: 106 MMHG

## 2018-08-11 VITALS — DIASTOLIC BLOOD PRESSURE: 69 MMHG | SYSTOLIC BLOOD PRESSURE: 89 MMHG

## 2018-08-11 VITALS — DIASTOLIC BLOOD PRESSURE: 54 MMHG | SYSTOLIC BLOOD PRESSURE: 83 MMHG

## 2018-08-11 VITALS — SYSTOLIC BLOOD PRESSURE: 91 MMHG | DIASTOLIC BLOOD PRESSURE: 58 MMHG

## 2018-08-11 VITALS — DIASTOLIC BLOOD PRESSURE: 52 MMHG | SYSTOLIC BLOOD PRESSURE: 85 MMHG

## 2018-08-11 VITALS — DIASTOLIC BLOOD PRESSURE: 63 MMHG | SYSTOLIC BLOOD PRESSURE: 89 MMHG

## 2018-08-11 VITALS — DIASTOLIC BLOOD PRESSURE: 59 MMHG | SYSTOLIC BLOOD PRESSURE: 92 MMHG

## 2018-08-11 VITALS — DIASTOLIC BLOOD PRESSURE: 61 MMHG | SYSTOLIC BLOOD PRESSURE: 108 MMHG

## 2018-08-11 VITALS — DIASTOLIC BLOOD PRESSURE: 49 MMHG | SYSTOLIC BLOOD PRESSURE: 85 MMHG

## 2018-08-11 VITALS — SYSTOLIC BLOOD PRESSURE: 97 MMHG | DIASTOLIC BLOOD PRESSURE: 63 MMHG

## 2018-08-11 VITALS — SYSTOLIC BLOOD PRESSURE: 87 MMHG | DIASTOLIC BLOOD PRESSURE: 61 MMHG

## 2018-08-11 VITALS — SYSTOLIC BLOOD PRESSURE: 96 MMHG | DIASTOLIC BLOOD PRESSURE: 73 MMHG

## 2018-08-11 VITALS — SYSTOLIC BLOOD PRESSURE: 99 MMHG | DIASTOLIC BLOOD PRESSURE: 65 MMHG

## 2018-08-11 VITALS — DIASTOLIC BLOOD PRESSURE: 61 MMHG | SYSTOLIC BLOOD PRESSURE: 96 MMHG

## 2018-08-11 VITALS — DIASTOLIC BLOOD PRESSURE: 62 MMHG | SYSTOLIC BLOOD PRESSURE: 97 MMHG

## 2018-08-11 VITALS — SYSTOLIC BLOOD PRESSURE: 94 MMHG | DIASTOLIC BLOOD PRESSURE: 61 MMHG

## 2018-08-11 VITALS — DIASTOLIC BLOOD PRESSURE: 71 MMHG | SYSTOLIC BLOOD PRESSURE: 105 MMHG

## 2018-08-11 VITALS — DIASTOLIC BLOOD PRESSURE: 62 MMHG | SYSTOLIC BLOOD PRESSURE: 99 MMHG

## 2018-08-11 VITALS — SYSTOLIC BLOOD PRESSURE: 103 MMHG | DIASTOLIC BLOOD PRESSURE: 67 MMHG

## 2018-08-11 VITALS — DIASTOLIC BLOOD PRESSURE: 54 MMHG | SYSTOLIC BLOOD PRESSURE: 88 MMHG

## 2018-08-11 VITALS — DIASTOLIC BLOOD PRESSURE: 69 MMHG | SYSTOLIC BLOOD PRESSURE: 90 MMHG

## 2018-08-11 LAB
ANION GAP SERPL CALC-SCNC: 14.3 MMOL/L (ref 8–16)
ANION GAP SERPL CALC-SCNC: 15.2 MMOL/L (ref 8–16)
BUN SERPL-MCNC: 23 MG/DL (ref 7–26)
BUN SERPL-MCNC: 24 MG/DL (ref 7–26)
BUN/CREAT SERPL: 42 (ref 6–25)
BUN/CREAT SERPL: 43 (ref 6–25)
CA-I BLDV-MCNC: 0.9 MMOL/L (ref 1.09–1.3)
CALCIUM SERPL-MCNC: 6.9 MG/DL (ref 8.4–10.2)
CALCIUM SERPL-MCNC: 6.9 MG/DL (ref 8.4–10.2)
CHLORIDE SERPL-SCNC: 108 MMOL/L (ref 98–107)
CHLORIDE SERPL-SCNC: 108 MMOL/L (ref 98–107)
CO2 SERPL-SCNC: 21 MMOL/L (ref 22–29)
CO2 SERPL-SCNC: 23 MMOL/L (ref 22–29)
EGFRCR SERPLBLD CKD-EPI 2021: > 60 ML/MIN (ref 60–?)
EGFRCR SERPLBLD CKD-EPI 2021: > 60 ML/MIN (ref 60–?)
GLUCOSE SERPLBLD-MCNC: 145 MG/DL (ref 74–118)
GLUCOSE SERPLBLD-MCNC: 149 MG/DL (ref 74–118)
POTASSIUM SERPL-SCNC: 3.2 MMOL/L (ref 3.5–5.1)
POTASSIUM SERPL-SCNC: 3.3 MMOL/L (ref 3.5–5.1)
SODIUM SERPL-SCNC: 140 MMOL/L (ref 136–145)
SODIUM SERPL-SCNC: 143 MMOL/L (ref 136–145)

## 2018-08-11 RX ADMIN — SODIUM CHLORIDE SCH MG: 900 INJECTION INTRAVENOUS at 09:49

## 2018-08-11 RX ADMIN — Medication SCH ML: at 20:00

## 2018-08-11 RX ADMIN — Medication SCH MLS/HR: at 09:49

## 2018-08-11 RX ADMIN — TAZOBACTAM SODIUM AND PIPERACILLIN SODIUM SCH MLS/HR: 375; 3 INJECTION, SOLUTION INTRAVENOUS at 20:53

## 2018-08-11 RX ADMIN — TAZOBACTAM SODIUM AND PIPERACILLIN SODIUM SCH MLS/HR: 375; 3 INJECTION, SOLUTION INTRAVENOUS at 05:00

## 2018-08-11 RX ADMIN — NICOTINE SCH MG: 7 PATCH, EXTENDED RELEASE TRANSDERMAL at 09:49

## 2018-08-11 RX ADMIN — TAZOBACTAM SODIUM AND PIPERACILLIN SODIUM SCH MLS/HR: 375; 3 INJECTION, SOLUTION INTRAVENOUS at 13:15

## 2018-08-11 RX ADMIN — Medication SCH ML: at 07:35

## 2018-08-11 RX ADMIN — Medication SCH MLS/HR: at 20:53

## 2018-08-11 RX ADMIN — PROPOFOL PRN MLS/HR: 10 INJECTION, EMULSION INTRAVENOUS at 05:59

## 2018-08-11 RX ADMIN — PROPOFOL PRN MLS/HR: 10 INJECTION, EMULSION INTRAVENOUS at 01:00

## 2018-08-11 RX ADMIN — SODIUM CHLORIDE PRN MLS/HR: 900 INJECTION, SOLUTION INTRAVENOUS at 23:18

## 2018-08-11 RX ADMIN — Medication SCH ML: at 23:10

## 2018-08-11 RX ADMIN — SODIUM CHLORIDE SCH MG: 900 INJECTION INTRAVENOUS at 20:53

## 2018-08-11 RX ADMIN — Medication SCH ML: at 11:00

## 2018-08-11 RX ADMIN — SODIUM CHLORIDE SCH MLS/HR: 900 INJECTION INTRAVENOUS at 09:49

## 2018-08-11 RX ADMIN — Medication SCH ML: at 03:00

## 2018-08-11 RX ADMIN — SODIUM CHLORIDE PRN MLS/HR: 900 INJECTION, SOLUTION INTRAVENOUS at 03:39

## 2018-08-11 RX ADMIN — PROPOFOL PRN MLS/HR: 10 INJECTION, EMULSION INTRAVENOUS at 13:00

## 2018-08-11 RX ADMIN — Medication SCH ML: at 15:10

## 2018-08-11 RX ADMIN — Medication SCH MLS/HR: at 16:22

## 2018-08-12 VITALS — DIASTOLIC BLOOD PRESSURE: 63 MMHG | SYSTOLIC BLOOD PRESSURE: 107 MMHG

## 2018-08-12 VITALS — SYSTOLIC BLOOD PRESSURE: 128 MMHG | DIASTOLIC BLOOD PRESSURE: 71 MMHG

## 2018-08-12 VITALS — DIASTOLIC BLOOD PRESSURE: 58 MMHG | SYSTOLIC BLOOD PRESSURE: 98 MMHG

## 2018-08-12 VITALS — SYSTOLIC BLOOD PRESSURE: 93 MMHG | DIASTOLIC BLOOD PRESSURE: 60 MMHG

## 2018-08-12 VITALS — SYSTOLIC BLOOD PRESSURE: 89 MMHG | DIASTOLIC BLOOD PRESSURE: 59 MMHG

## 2018-08-12 VITALS — DIASTOLIC BLOOD PRESSURE: 62 MMHG | SYSTOLIC BLOOD PRESSURE: 103 MMHG

## 2018-08-12 VITALS — DIASTOLIC BLOOD PRESSURE: 54 MMHG | SYSTOLIC BLOOD PRESSURE: 98 MMHG

## 2018-08-12 VITALS — SYSTOLIC BLOOD PRESSURE: 113 MMHG | DIASTOLIC BLOOD PRESSURE: 75 MMHG

## 2018-08-12 VITALS — SYSTOLIC BLOOD PRESSURE: 81 MMHG | DIASTOLIC BLOOD PRESSURE: 46 MMHG

## 2018-08-12 VITALS — SYSTOLIC BLOOD PRESSURE: 107 MMHG | DIASTOLIC BLOOD PRESSURE: 67 MMHG

## 2018-08-12 VITALS — DIASTOLIC BLOOD PRESSURE: 68 MMHG | SYSTOLIC BLOOD PRESSURE: 116 MMHG

## 2018-08-12 VITALS — DIASTOLIC BLOOD PRESSURE: 63 MMHG | SYSTOLIC BLOOD PRESSURE: 94 MMHG

## 2018-08-12 VITALS — SYSTOLIC BLOOD PRESSURE: 108 MMHG | DIASTOLIC BLOOD PRESSURE: 64 MMHG

## 2018-08-12 VITALS — DIASTOLIC BLOOD PRESSURE: 59 MMHG | SYSTOLIC BLOOD PRESSURE: 104 MMHG

## 2018-08-12 VITALS — DIASTOLIC BLOOD PRESSURE: 60 MMHG | SYSTOLIC BLOOD PRESSURE: 105 MMHG

## 2018-08-12 VITALS — DIASTOLIC BLOOD PRESSURE: 66 MMHG | SYSTOLIC BLOOD PRESSURE: 102 MMHG

## 2018-08-12 VITALS — SYSTOLIC BLOOD PRESSURE: 144 MMHG | DIASTOLIC BLOOD PRESSURE: 75 MMHG

## 2018-08-12 VITALS — DIASTOLIC BLOOD PRESSURE: 78 MMHG | SYSTOLIC BLOOD PRESSURE: 138 MMHG

## 2018-08-12 VITALS — SYSTOLIC BLOOD PRESSURE: 112 MMHG | DIASTOLIC BLOOD PRESSURE: 66 MMHG

## 2018-08-12 VITALS — DIASTOLIC BLOOD PRESSURE: 54 MMHG | SYSTOLIC BLOOD PRESSURE: 91 MMHG

## 2018-08-12 VITALS — SYSTOLIC BLOOD PRESSURE: 119 MMHG | DIASTOLIC BLOOD PRESSURE: 68 MMHG

## 2018-08-12 VITALS — DIASTOLIC BLOOD PRESSURE: 51 MMHG | SYSTOLIC BLOOD PRESSURE: 90 MMHG

## 2018-08-12 VITALS — SYSTOLIC BLOOD PRESSURE: 101 MMHG | DIASTOLIC BLOOD PRESSURE: 62 MMHG

## 2018-08-12 VITALS — DIASTOLIC BLOOD PRESSURE: 68 MMHG | SYSTOLIC BLOOD PRESSURE: 115 MMHG

## 2018-08-12 VITALS — SYSTOLIC BLOOD PRESSURE: 82 MMHG | DIASTOLIC BLOOD PRESSURE: 47 MMHG

## 2018-08-12 VITALS — SYSTOLIC BLOOD PRESSURE: 124 MMHG | DIASTOLIC BLOOD PRESSURE: 78 MMHG

## 2018-08-12 VITALS — DIASTOLIC BLOOD PRESSURE: 63 MMHG | SYSTOLIC BLOOD PRESSURE: 105 MMHG

## 2018-08-12 VITALS — SYSTOLIC BLOOD PRESSURE: 91 MMHG | DIASTOLIC BLOOD PRESSURE: 62 MMHG

## 2018-08-12 VITALS — SYSTOLIC BLOOD PRESSURE: 106 MMHG | DIASTOLIC BLOOD PRESSURE: 61 MMHG

## 2018-08-12 VITALS — SYSTOLIC BLOOD PRESSURE: 123 MMHG | DIASTOLIC BLOOD PRESSURE: 66 MMHG

## 2018-08-12 VITALS — DIASTOLIC BLOOD PRESSURE: 64 MMHG | SYSTOLIC BLOOD PRESSURE: 108 MMHG

## 2018-08-12 VITALS — SYSTOLIC BLOOD PRESSURE: 112 MMHG | DIASTOLIC BLOOD PRESSURE: 65 MMHG

## 2018-08-12 VITALS — SYSTOLIC BLOOD PRESSURE: 97 MMHG | DIASTOLIC BLOOD PRESSURE: 61 MMHG

## 2018-08-12 VITALS — SYSTOLIC BLOOD PRESSURE: 90 MMHG | DIASTOLIC BLOOD PRESSURE: 55 MMHG

## 2018-08-12 VITALS — DIASTOLIC BLOOD PRESSURE: 67 MMHG | SYSTOLIC BLOOD PRESSURE: 108 MMHG

## 2018-08-12 VITALS — SYSTOLIC BLOOD PRESSURE: 137 MMHG | DIASTOLIC BLOOD PRESSURE: 87 MMHG

## 2018-08-12 VITALS — DIASTOLIC BLOOD PRESSURE: 61 MMHG | SYSTOLIC BLOOD PRESSURE: 106 MMHG

## 2018-08-12 VITALS — DIASTOLIC BLOOD PRESSURE: 65 MMHG | SYSTOLIC BLOOD PRESSURE: 109 MMHG

## 2018-08-12 VITALS — DIASTOLIC BLOOD PRESSURE: 72 MMHG | SYSTOLIC BLOOD PRESSURE: 120 MMHG

## 2018-08-12 VITALS — SYSTOLIC BLOOD PRESSURE: 106 MMHG | DIASTOLIC BLOOD PRESSURE: 58 MMHG

## 2018-08-12 VITALS — DIASTOLIC BLOOD PRESSURE: 70 MMHG | SYSTOLIC BLOOD PRESSURE: 119 MMHG

## 2018-08-12 VITALS — SYSTOLIC BLOOD PRESSURE: 109 MMHG | DIASTOLIC BLOOD PRESSURE: 61 MMHG

## 2018-08-12 VITALS — SYSTOLIC BLOOD PRESSURE: 113 MMHG | DIASTOLIC BLOOD PRESSURE: 64 MMHG

## 2018-08-12 VITALS — DIASTOLIC BLOOD PRESSURE: 73 MMHG | SYSTOLIC BLOOD PRESSURE: 104 MMHG

## 2018-08-12 VITALS — DIASTOLIC BLOOD PRESSURE: 57 MMHG | SYSTOLIC BLOOD PRESSURE: 84 MMHG

## 2018-08-12 VITALS — SYSTOLIC BLOOD PRESSURE: 102 MMHG | DIASTOLIC BLOOD PRESSURE: 56 MMHG

## 2018-08-12 VITALS — DIASTOLIC BLOOD PRESSURE: 57 MMHG | SYSTOLIC BLOOD PRESSURE: 99 MMHG

## 2018-08-12 VITALS — DIASTOLIC BLOOD PRESSURE: 58 MMHG | SYSTOLIC BLOOD PRESSURE: 96 MMHG

## 2018-08-12 VITALS — DIASTOLIC BLOOD PRESSURE: 73 MMHG | SYSTOLIC BLOOD PRESSURE: 124 MMHG

## 2018-08-12 VITALS — SYSTOLIC BLOOD PRESSURE: 117 MMHG | DIASTOLIC BLOOD PRESSURE: 69 MMHG

## 2018-08-12 VITALS — SYSTOLIC BLOOD PRESSURE: 97 MMHG | DIASTOLIC BLOOD PRESSURE: 59 MMHG

## 2018-08-12 VITALS — DIASTOLIC BLOOD PRESSURE: 52 MMHG | SYSTOLIC BLOOD PRESSURE: 93 MMHG

## 2018-08-12 VITALS — DIASTOLIC BLOOD PRESSURE: 69 MMHG | SYSTOLIC BLOOD PRESSURE: 98 MMHG

## 2018-08-12 VITALS — DIASTOLIC BLOOD PRESSURE: 68 MMHG | SYSTOLIC BLOOD PRESSURE: 114 MMHG

## 2018-08-12 VITALS — SYSTOLIC BLOOD PRESSURE: 113 MMHG | DIASTOLIC BLOOD PRESSURE: 65 MMHG

## 2018-08-12 VITALS — DIASTOLIC BLOOD PRESSURE: 63 MMHG | SYSTOLIC BLOOD PRESSURE: 104 MMHG

## 2018-08-12 VITALS — DIASTOLIC BLOOD PRESSURE: 57 MMHG | SYSTOLIC BLOOD PRESSURE: 89 MMHG

## 2018-08-12 VITALS — DIASTOLIC BLOOD PRESSURE: 66 MMHG | SYSTOLIC BLOOD PRESSURE: 112 MMHG

## 2018-08-12 VITALS — DIASTOLIC BLOOD PRESSURE: 63 MMHG | SYSTOLIC BLOOD PRESSURE: 106 MMHG

## 2018-08-12 VITALS — DIASTOLIC BLOOD PRESSURE: 61 MMHG | SYSTOLIC BLOOD PRESSURE: 94 MMHG

## 2018-08-12 VITALS — SYSTOLIC BLOOD PRESSURE: 114 MMHG | DIASTOLIC BLOOD PRESSURE: 66 MMHG

## 2018-08-12 VITALS — DIASTOLIC BLOOD PRESSURE: 69 MMHG | SYSTOLIC BLOOD PRESSURE: 117 MMHG

## 2018-08-12 VITALS — DIASTOLIC BLOOD PRESSURE: 77 MMHG | SYSTOLIC BLOOD PRESSURE: 122 MMHG

## 2018-08-12 VITALS — DIASTOLIC BLOOD PRESSURE: 63 MMHG | SYSTOLIC BLOOD PRESSURE: 88 MMHG

## 2018-08-12 LAB
ANION GAP SERPL CALC-SCNC: 12.5 MMOL/L (ref 8–16)
ANISOCYTOSIS BLD QL SMEAR: SLIGHT
BASE EXCESS BLDA CALC-SCNC: -4 MMOL/L (ref -2–3)
BASOPHILS # BLD AUTO: 0.1 10*3/UL (ref 0–0.1)
BASOPHILS NFR BLD AUTO: 0.4 % (ref 0–1)
BUN SERPL-MCNC: 25 MG/DL (ref 7–26)
BUN/CREAT SERPL: 50 (ref 6–25)
CALCIUM SERPL-MCNC: 7.3 MG/DL (ref 8.4–10.2)
CHLORIDE SERPL-SCNC: 112 MMOL/L (ref 98–107)
CO2 SERPL-SCNC: 22 MMOL/L (ref 22–29)
DEPRECATED NEUTROPHILS # BLD AUTO: 15.9 10*3/UL (ref 2.1–6.9)
EGFRCR SERPLBLD CKD-EPI 2021: > 60 ML/MIN (ref 60–?)
EOSINOPHIL # BLD AUTO: 0.1 10*3/UL (ref 0–0.4)
EOSINOPHIL NFR BLD AUTO: 0.6 % (ref 0–6)
ERYTHROCYTE [DISTWIDTH] IN CORD BLOOD: 16.3 % (ref 11.7–14.4)
GLUCOSE SERPLBLD-MCNC: 107 MG/DL (ref 74–118)
HCO3 BLDA-SCNC: 22 MMOL/L (ref 23–28)
HCT VFR BLD AUTO: 26.7 % (ref 34.2–44.1)
HGB BLD-MCNC: 8.6 G/DL (ref 12–16)
HYPOCHROMIA BLD QL SMEAR: SLIGHT
LYMPHOCYTES # BLD: 0.9 10*3/UL (ref 1–3.2)
LYMPHOCYTES NFR BLD AUTO: 4.7 % (ref 18–39.1)
LYMPHOCYTES NFR BLD MANUAL: 7 % (ref 19–48)
MAGNESIUM SERPL-MCNC: 1.7 MG/DL (ref 1.3–2.1)
MCH RBC QN AUTO: 32.8 PG (ref 28–32)
MCHC RBC AUTO-ENTMCNC: 32.2 G/DL (ref 31–35)
MCV RBC AUTO: 101.9 FL (ref 81–99)
METAMYELOCYTES NFR BLD MANUAL: 1 % (ref 0–0)
MICROCYTES BLD QL SMEAR: SLIGHT
MONOCYTES # BLD AUTO: 0.3 10*3/UL (ref 0.2–0.8)
MONOCYTES NFR BLD AUTO: 1.3 % (ref 4.4–11.3)
NEUTS BAND NFR BLD MANUAL: 1 %
NEUTS SEG NFR BLD AUTO: 84 % (ref 38.7–80)
NEUTS SEG NFR BLD MANUAL: 91 % (ref 40–74)
NRBC/RBC NFR BLD MANUAL: 1 %
PCO2 BLDA: 39 MMHG (ref 41–51)
PCO2 BLDA: 76 MMHG (ref 80–105)
PH BLDA: 7.35 [PH] (ref 7.31–7.41)
PLAT MORPH BLD: (no result)
PLATELET # BLD AUTO: 218 X10E3/UL (ref 140–360)
PLATELET # BLD EST: ADEQUATE 10*3/UL
POIKILOCYTOSIS BLD QL SMEAR: SLIGHT
POTASSIUM SERPL-SCNC: 3.5 MMOL/L (ref 3.5–5.1)
RBC # BLD AUTO: 2.62 X10E6/UL (ref 3.6–5.1)
RBC MORPH BLD: NORMAL
SAO2 % BLDA: 94 % (ref 95–98)
SODIUM SERPL-SCNC: 143 MMOL/L (ref 136–145)

## 2018-08-12 RX ADMIN — Medication SCH ML: at 11:13

## 2018-08-12 RX ADMIN — Medication SCH MLS/HR: at 20:38

## 2018-08-12 RX ADMIN — Medication SCH ML: at 04:40

## 2018-08-12 RX ADMIN — Medication SCH MLS/HR: at 11:45

## 2018-08-12 RX ADMIN — LORAZEPAM SCH MG: 1 TABLET ORAL at 22:12

## 2018-08-12 RX ADMIN — NICOTINE SCH MG: 7 PATCH, EXTENDED RELEASE TRANSDERMAL at 10:37

## 2018-08-12 RX ADMIN — Medication SCH ML: at 15:20

## 2018-08-12 RX ADMIN — Medication SCH MLS/HR: at 16:33

## 2018-08-12 RX ADMIN — LORAZEPAM SCH MG: 1 TABLET ORAL at 11:44

## 2018-08-12 RX ADMIN — Medication SCH ML: at 22:45

## 2018-08-12 RX ADMIN — SODIUM CHLORIDE SCH MG: 900 INJECTION INTRAVENOUS at 20:38

## 2018-08-12 RX ADMIN — TAZOBACTAM SODIUM AND PIPERACILLIN SODIUM SCH MLS/HR: 375; 3 INJECTION, SOLUTION INTRAVENOUS at 04:40

## 2018-08-12 RX ADMIN — FLUCONAZOLE IN SODIUM CHLORIDE SCH MLS/HR: 2 INJECTION, SOLUTION INTRAVENOUS at 10:37

## 2018-08-12 RX ADMIN — SODIUM CHLORIDE SCH MLS/HR: 900 INJECTION INTRAVENOUS at 10:06

## 2018-08-12 RX ADMIN — LORAZEPAM SCH MG: 1 TABLET ORAL at 17:35

## 2018-08-12 RX ADMIN — Medication SCH ML: at 19:40

## 2018-08-12 RX ADMIN — SODIUM CHLORIDE PRN MLS/HR: 900 INJECTION, SOLUTION INTRAVENOUS at 06:25

## 2018-08-12 RX ADMIN — Medication SCH ML: at 07:19

## 2018-08-12 RX ADMIN — SODIUM CHLORIDE SCH MG: 900 INJECTION INTRAVENOUS at 10:37

## 2018-08-12 NOTE — DIAGNOSTIC IMAGING REPORT
EXAM: CHEST SINGLE (PORTABLE), AP 1 view

INDICATION: Shortness of breath

COMPARISON: August 19, 2018



FINDINGS:

LINES/TUBES: Stable tracheostomy tube and nasal/orogastric tube and right

approach PICC.



LUNGS: Worsening opacifications of the bilateral lungs. 



PLEURA: Indeterminate for effusion.



HEART AND MEDIASTINUM: Normal size and contour.



BONES AND SOFT TISSUES: No acute findings. 



IMPRESSION:

Worsening opacification of the bilateral lungs.







Signed by: Dr. Galilea Gagnon M.D. on 8/12/2018 6:39 AM

## 2018-08-13 VITALS — SYSTOLIC BLOOD PRESSURE: 107 MMHG | DIASTOLIC BLOOD PRESSURE: 66 MMHG

## 2018-08-13 VITALS — SYSTOLIC BLOOD PRESSURE: 108 MMHG | DIASTOLIC BLOOD PRESSURE: 67 MMHG

## 2018-08-13 VITALS — DIASTOLIC BLOOD PRESSURE: 65 MMHG | SYSTOLIC BLOOD PRESSURE: 97 MMHG

## 2018-08-13 VITALS — DIASTOLIC BLOOD PRESSURE: 72 MMHG | SYSTOLIC BLOOD PRESSURE: 114 MMHG

## 2018-08-13 VITALS — SYSTOLIC BLOOD PRESSURE: 114 MMHG | DIASTOLIC BLOOD PRESSURE: 68 MMHG

## 2018-08-13 VITALS — SYSTOLIC BLOOD PRESSURE: 123 MMHG | DIASTOLIC BLOOD PRESSURE: 73 MMHG

## 2018-08-13 VITALS — DIASTOLIC BLOOD PRESSURE: 59 MMHG | SYSTOLIC BLOOD PRESSURE: 99 MMHG

## 2018-08-13 VITALS — DIASTOLIC BLOOD PRESSURE: 71 MMHG | SYSTOLIC BLOOD PRESSURE: 109 MMHG

## 2018-08-13 VITALS — SYSTOLIC BLOOD PRESSURE: 101 MMHG | DIASTOLIC BLOOD PRESSURE: 60 MMHG

## 2018-08-13 VITALS — SYSTOLIC BLOOD PRESSURE: 106 MMHG | DIASTOLIC BLOOD PRESSURE: 64 MMHG

## 2018-08-13 VITALS — DIASTOLIC BLOOD PRESSURE: 77 MMHG | SYSTOLIC BLOOD PRESSURE: 118 MMHG

## 2018-08-13 VITALS — DIASTOLIC BLOOD PRESSURE: 62 MMHG | SYSTOLIC BLOOD PRESSURE: 103 MMHG

## 2018-08-13 VITALS — DIASTOLIC BLOOD PRESSURE: 70 MMHG | SYSTOLIC BLOOD PRESSURE: 105 MMHG

## 2018-08-13 VITALS — SYSTOLIC BLOOD PRESSURE: 111 MMHG | DIASTOLIC BLOOD PRESSURE: 71 MMHG

## 2018-08-13 VITALS — SYSTOLIC BLOOD PRESSURE: 140 MMHG | DIASTOLIC BLOOD PRESSURE: 91 MMHG

## 2018-08-13 VITALS — DIASTOLIC BLOOD PRESSURE: 79 MMHG | SYSTOLIC BLOOD PRESSURE: 125 MMHG

## 2018-08-13 VITALS — DIASTOLIC BLOOD PRESSURE: 74 MMHG | SYSTOLIC BLOOD PRESSURE: 126 MMHG

## 2018-08-13 VITALS — DIASTOLIC BLOOD PRESSURE: 76 MMHG | SYSTOLIC BLOOD PRESSURE: 120 MMHG

## 2018-08-13 VITALS — SYSTOLIC BLOOD PRESSURE: 97 MMHG | DIASTOLIC BLOOD PRESSURE: 63 MMHG

## 2018-08-13 VITALS — DIASTOLIC BLOOD PRESSURE: 74 MMHG | SYSTOLIC BLOOD PRESSURE: 103 MMHG

## 2018-08-13 VITALS — SYSTOLIC BLOOD PRESSURE: 100 MMHG | DIASTOLIC BLOOD PRESSURE: 66 MMHG

## 2018-08-13 VITALS — SYSTOLIC BLOOD PRESSURE: 105 MMHG | DIASTOLIC BLOOD PRESSURE: 59 MMHG

## 2018-08-13 VITALS — DIASTOLIC BLOOD PRESSURE: 70 MMHG | SYSTOLIC BLOOD PRESSURE: 100 MMHG

## 2018-08-13 VITALS — DIASTOLIC BLOOD PRESSURE: 78 MMHG | SYSTOLIC BLOOD PRESSURE: 120 MMHG

## 2018-08-13 VITALS — DIASTOLIC BLOOD PRESSURE: 62 MMHG | SYSTOLIC BLOOD PRESSURE: 109 MMHG

## 2018-08-13 VITALS — SYSTOLIC BLOOD PRESSURE: 102 MMHG | DIASTOLIC BLOOD PRESSURE: 73 MMHG

## 2018-08-13 VITALS — DIASTOLIC BLOOD PRESSURE: 80 MMHG | SYSTOLIC BLOOD PRESSURE: 116 MMHG

## 2018-08-13 VITALS — SYSTOLIC BLOOD PRESSURE: 112 MMHG | DIASTOLIC BLOOD PRESSURE: 76 MMHG

## 2018-08-13 VITALS — DIASTOLIC BLOOD PRESSURE: 79 MMHG | SYSTOLIC BLOOD PRESSURE: 128 MMHG

## 2018-08-13 VITALS — DIASTOLIC BLOOD PRESSURE: 61 MMHG | SYSTOLIC BLOOD PRESSURE: 104 MMHG

## 2018-08-13 VITALS — DIASTOLIC BLOOD PRESSURE: 68 MMHG | SYSTOLIC BLOOD PRESSURE: 106 MMHG

## 2018-08-13 VITALS — SYSTOLIC BLOOD PRESSURE: 124 MMHG | DIASTOLIC BLOOD PRESSURE: 78 MMHG

## 2018-08-13 VITALS — SYSTOLIC BLOOD PRESSURE: 106 MMHG | DIASTOLIC BLOOD PRESSURE: 67 MMHG

## 2018-08-13 VITALS — SYSTOLIC BLOOD PRESSURE: 105 MMHG | DIASTOLIC BLOOD PRESSURE: 72 MMHG

## 2018-08-13 VITALS — SYSTOLIC BLOOD PRESSURE: 107 MMHG | DIASTOLIC BLOOD PRESSURE: 68 MMHG

## 2018-08-13 VITALS — SYSTOLIC BLOOD PRESSURE: 124 MMHG | DIASTOLIC BLOOD PRESSURE: 79 MMHG

## 2018-08-13 VITALS — DIASTOLIC BLOOD PRESSURE: 67 MMHG | SYSTOLIC BLOOD PRESSURE: 101 MMHG

## 2018-08-13 VITALS — SYSTOLIC BLOOD PRESSURE: 102 MMHG | DIASTOLIC BLOOD PRESSURE: 62 MMHG

## 2018-08-13 VITALS — SYSTOLIC BLOOD PRESSURE: 101 MMHG | DIASTOLIC BLOOD PRESSURE: 58 MMHG

## 2018-08-13 VITALS — SYSTOLIC BLOOD PRESSURE: 122 MMHG | DIASTOLIC BLOOD PRESSURE: 79 MMHG

## 2018-08-13 VITALS — DIASTOLIC BLOOD PRESSURE: 84 MMHG | SYSTOLIC BLOOD PRESSURE: 141 MMHG

## 2018-08-13 VITALS — SYSTOLIC BLOOD PRESSURE: 94 MMHG | DIASTOLIC BLOOD PRESSURE: 59 MMHG

## 2018-08-13 VITALS — DIASTOLIC BLOOD PRESSURE: 66 MMHG | SYSTOLIC BLOOD PRESSURE: 103 MMHG

## 2018-08-13 VITALS — SYSTOLIC BLOOD PRESSURE: 106 MMHG | DIASTOLIC BLOOD PRESSURE: 58 MMHG

## 2018-08-13 VITALS — DIASTOLIC BLOOD PRESSURE: 81 MMHG | SYSTOLIC BLOOD PRESSURE: 123 MMHG

## 2018-08-13 VITALS — SYSTOLIC BLOOD PRESSURE: 100 MMHG | DIASTOLIC BLOOD PRESSURE: 71 MMHG

## 2018-08-13 VITALS — DIASTOLIC BLOOD PRESSURE: 72 MMHG | SYSTOLIC BLOOD PRESSURE: 110 MMHG

## 2018-08-13 VITALS — SYSTOLIC BLOOD PRESSURE: 98 MMHG | DIASTOLIC BLOOD PRESSURE: 62 MMHG

## 2018-08-13 VITALS — SYSTOLIC BLOOD PRESSURE: 114 MMHG | DIASTOLIC BLOOD PRESSURE: 71 MMHG

## 2018-08-13 VITALS — SYSTOLIC BLOOD PRESSURE: 99 MMHG | DIASTOLIC BLOOD PRESSURE: 76 MMHG

## 2018-08-13 VITALS — SYSTOLIC BLOOD PRESSURE: 114 MMHG | DIASTOLIC BLOOD PRESSURE: 75 MMHG

## 2018-08-13 VITALS — SYSTOLIC BLOOD PRESSURE: 118 MMHG | DIASTOLIC BLOOD PRESSURE: 74 MMHG

## 2018-08-13 VITALS — SYSTOLIC BLOOD PRESSURE: 99 MMHG | DIASTOLIC BLOOD PRESSURE: 63 MMHG

## 2018-08-13 VITALS — SYSTOLIC BLOOD PRESSURE: 102 MMHG | DIASTOLIC BLOOD PRESSURE: 66 MMHG

## 2018-08-13 VITALS — SYSTOLIC BLOOD PRESSURE: 97 MMHG | DIASTOLIC BLOOD PRESSURE: 57 MMHG

## 2018-08-13 VITALS — SYSTOLIC BLOOD PRESSURE: 105 MMHG | DIASTOLIC BLOOD PRESSURE: 73 MMHG

## 2018-08-13 VITALS — SYSTOLIC BLOOD PRESSURE: 109 MMHG | DIASTOLIC BLOOD PRESSURE: 69 MMHG

## 2018-08-13 VITALS — DIASTOLIC BLOOD PRESSURE: 70 MMHG | SYSTOLIC BLOOD PRESSURE: 114 MMHG

## 2018-08-13 VITALS — DIASTOLIC BLOOD PRESSURE: 72 MMHG | SYSTOLIC BLOOD PRESSURE: 109 MMHG

## 2018-08-13 VITALS — DIASTOLIC BLOOD PRESSURE: 65 MMHG | SYSTOLIC BLOOD PRESSURE: 102 MMHG

## 2018-08-13 VITALS — DIASTOLIC BLOOD PRESSURE: 61 MMHG | SYSTOLIC BLOOD PRESSURE: 94 MMHG

## 2018-08-13 VITALS — SYSTOLIC BLOOD PRESSURE: 105 MMHG | DIASTOLIC BLOOD PRESSURE: 67 MMHG

## 2018-08-13 VITALS — DIASTOLIC BLOOD PRESSURE: 81 MMHG | SYSTOLIC BLOOD PRESSURE: 136 MMHG

## 2018-08-13 VITALS — DIASTOLIC BLOOD PRESSURE: 88 MMHG | SYSTOLIC BLOOD PRESSURE: 142 MMHG

## 2018-08-13 PROCEDURE — 0B110F4 BYPASS TRACHEA TO CUTANEOUS WITH TRACHEOSTOMY DEVICE, OPEN APPROACH: ICD-10-PCS

## 2018-08-13 RX ADMIN — SODIUM CHLORIDE SCH MG: 900 INJECTION INTRAVENOUS at 21:26

## 2018-08-13 RX ADMIN — Medication SCH MLS/HR: at 17:00

## 2018-08-13 RX ADMIN — SODIUM CHLORIDE SCH MLS/HR: 900 INJECTION INTRAVENOUS at 09:00

## 2018-08-13 RX ADMIN — Medication SCH ML: at 07:25

## 2018-08-13 RX ADMIN — LORAZEPAM SCH MG: 1 TABLET ORAL at 06:03

## 2018-08-13 RX ADMIN — NICOTINE SCH MG: 7 PATCH, EXTENDED RELEASE TRANSDERMAL at 09:00

## 2018-08-13 RX ADMIN — SODIUM CHLORIDE PRN MLS/HR: 900 INJECTION, SOLUTION INTRAVENOUS at 10:00

## 2018-08-13 RX ADMIN — ACETAMINOPHEN PRN MG: 325 SOLUTION ORAL at 14:00

## 2018-08-13 RX ADMIN — LORAZEPAM SCH MG: 1 TABLET ORAL at 14:00

## 2018-08-13 RX ADMIN — Medication SCH MLS/HR: at 09:00

## 2018-08-13 RX ADMIN — Medication SCH ML: at 22:50

## 2018-08-13 RX ADMIN — Medication SCH ML: at 02:40

## 2018-08-13 RX ADMIN — Medication SCH ML: at 19:22

## 2018-08-13 RX ADMIN — Medication SCH ML: at 14:20

## 2018-08-13 RX ADMIN — LORAZEPAM SCH MG: 1 TABLET ORAL at 22:51

## 2018-08-13 RX ADMIN — Medication SCH ML: at 11:30

## 2018-08-13 RX ADMIN — PROPOFOL PRN MLS/HR: 10 INJECTION, EMULSION INTRAVENOUS at 17:24

## 2018-08-13 RX ADMIN — FLUCONAZOLE IN SODIUM CHLORIDE SCH MLS/HR: 2 INJECTION, SOLUTION INTRAVENOUS at 09:00

## 2018-08-13 RX ADMIN — PROPOFOL PRN MLS/HR: 10 INJECTION, EMULSION INTRAVENOUS at 06:48

## 2018-08-13 RX ADMIN — SODIUM CHLORIDE SCH MG: 900 INJECTION INTRAVENOUS at 09:00

## 2018-08-13 RX ADMIN — Medication SCH MLS/HR: at 21:26

## 2018-08-13 RX ADMIN — ACETAMINOPHEN PRN MG: 325 SOLUTION ORAL at 06:18

## 2018-08-14 VITALS — DIASTOLIC BLOOD PRESSURE: 72 MMHG | SYSTOLIC BLOOD PRESSURE: 111 MMHG

## 2018-08-14 VITALS — SYSTOLIC BLOOD PRESSURE: 112 MMHG | DIASTOLIC BLOOD PRESSURE: 71 MMHG

## 2018-08-14 VITALS — DIASTOLIC BLOOD PRESSURE: 74 MMHG | SYSTOLIC BLOOD PRESSURE: 119 MMHG

## 2018-08-14 VITALS — DIASTOLIC BLOOD PRESSURE: 76 MMHG | SYSTOLIC BLOOD PRESSURE: 115 MMHG

## 2018-08-14 VITALS — SYSTOLIC BLOOD PRESSURE: 114 MMHG | DIASTOLIC BLOOD PRESSURE: 76 MMHG

## 2018-08-14 VITALS — DIASTOLIC BLOOD PRESSURE: 65 MMHG | SYSTOLIC BLOOD PRESSURE: 105 MMHG

## 2018-08-14 VITALS — SYSTOLIC BLOOD PRESSURE: 101 MMHG | DIASTOLIC BLOOD PRESSURE: 68 MMHG

## 2018-08-14 VITALS — DIASTOLIC BLOOD PRESSURE: 74 MMHG | SYSTOLIC BLOOD PRESSURE: 106 MMHG

## 2018-08-14 VITALS — SYSTOLIC BLOOD PRESSURE: 130 MMHG | DIASTOLIC BLOOD PRESSURE: 83 MMHG

## 2018-08-14 VITALS — SYSTOLIC BLOOD PRESSURE: 103 MMHG | DIASTOLIC BLOOD PRESSURE: 63 MMHG

## 2018-08-14 VITALS — SYSTOLIC BLOOD PRESSURE: 107 MMHG | DIASTOLIC BLOOD PRESSURE: 74 MMHG

## 2018-08-14 VITALS — SYSTOLIC BLOOD PRESSURE: 108 MMHG | DIASTOLIC BLOOD PRESSURE: 71 MMHG

## 2018-08-14 VITALS — SYSTOLIC BLOOD PRESSURE: 102 MMHG | DIASTOLIC BLOOD PRESSURE: 75 MMHG

## 2018-08-14 VITALS — SYSTOLIC BLOOD PRESSURE: 109 MMHG | DIASTOLIC BLOOD PRESSURE: 75 MMHG

## 2018-08-14 VITALS — DIASTOLIC BLOOD PRESSURE: 72 MMHG | SYSTOLIC BLOOD PRESSURE: 107 MMHG

## 2018-08-14 VITALS — DIASTOLIC BLOOD PRESSURE: 75 MMHG | SYSTOLIC BLOOD PRESSURE: 103 MMHG

## 2018-08-14 VITALS — DIASTOLIC BLOOD PRESSURE: 65 MMHG | SYSTOLIC BLOOD PRESSURE: 95 MMHG

## 2018-08-14 VITALS — DIASTOLIC BLOOD PRESSURE: 67 MMHG | SYSTOLIC BLOOD PRESSURE: 105 MMHG

## 2018-08-14 VITALS — SYSTOLIC BLOOD PRESSURE: 114 MMHG | DIASTOLIC BLOOD PRESSURE: 79 MMHG

## 2018-08-14 VITALS — DIASTOLIC BLOOD PRESSURE: 72 MMHG | SYSTOLIC BLOOD PRESSURE: 109 MMHG

## 2018-08-14 VITALS — SYSTOLIC BLOOD PRESSURE: 98 MMHG | DIASTOLIC BLOOD PRESSURE: 69 MMHG

## 2018-08-14 VITALS — DIASTOLIC BLOOD PRESSURE: 83 MMHG | SYSTOLIC BLOOD PRESSURE: 129 MMHG

## 2018-08-14 VITALS — SYSTOLIC BLOOD PRESSURE: 116 MMHG | DIASTOLIC BLOOD PRESSURE: 75 MMHG

## 2018-08-14 VITALS — DIASTOLIC BLOOD PRESSURE: 77 MMHG | SYSTOLIC BLOOD PRESSURE: 111 MMHG

## 2018-08-14 VITALS — SYSTOLIC BLOOD PRESSURE: 115 MMHG | DIASTOLIC BLOOD PRESSURE: 83 MMHG

## 2018-08-14 VITALS — DIASTOLIC BLOOD PRESSURE: 77 MMHG | SYSTOLIC BLOOD PRESSURE: 114 MMHG

## 2018-08-14 VITALS — SYSTOLIC BLOOD PRESSURE: 131 MMHG | DIASTOLIC BLOOD PRESSURE: 82 MMHG

## 2018-08-14 VITALS — SYSTOLIC BLOOD PRESSURE: 110 MMHG | DIASTOLIC BLOOD PRESSURE: 76 MMHG

## 2018-08-14 VITALS — DIASTOLIC BLOOD PRESSURE: 80 MMHG | SYSTOLIC BLOOD PRESSURE: 114 MMHG

## 2018-08-14 VITALS — DIASTOLIC BLOOD PRESSURE: 86 MMHG | SYSTOLIC BLOOD PRESSURE: 118 MMHG

## 2018-08-14 VITALS — DIASTOLIC BLOOD PRESSURE: 81 MMHG | SYSTOLIC BLOOD PRESSURE: 126 MMHG

## 2018-08-14 VITALS — SYSTOLIC BLOOD PRESSURE: 116 MMHG | DIASTOLIC BLOOD PRESSURE: 74 MMHG

## 2018-08-14 VITALS — SYSTOLIC BLOOD PRESSURE: 120 MMHG | DIASTOLIC BLOOD PRESSURE: 77 MMHG

## 2018-08-14 VITALS — DIASTOLIC BLOOD PRESSURE: 78 MMHG | SYSTOLIC BLOOD PRESSURE: 119 MMHG

## 2018-08-14 VITALS — SYSTOLIC BLOOD PRESSURE: 109 MMHG | DIASTOLIC BLOOD PRESSURE: 68 MMHG

## 2018-08-14 VITALS — DIASTOLIC BLOOD PRESSURE: 72 MMHG | SYSTOLIC BLOOD PRESSURE: 106 MMHG

## 2018-08-14 VITALS — DIASTOLIC BLOOD PRESSURE: 79 MMHG | SYSTOLIC BLOOD PRESSURE: 114 MMHG

## 2018-08-14 VITALS — SYSTOLIC BLOOD PRESSURE: 116 MMHG | DIASTOLIC BLOOD PRESSURE: 79 MMHG

## 2018-08-14 VITALS — SYSTOLIC BLOOD PRESSURE: 106 MMHG | DIASTOLIC BLOOD PRESSURE: 75 MMHG

## 2018-08-14 VITALS — DIASTOLIC BLOOD PRESSURE: 80 MMHG | SYSTOLIC BLOOD PRESSURE: 123 MMHG

## 2018-08-14 VITALS — SYSTOLIC BLOOD PRESSURE: 104 MMHG | DIASTOLIC BLOOD PRESSURE: 72 MMHG

## 2018-08-14 VITALS — DIASTOLIC BLOOD PRESSURE: 66 MMHG | SYSTOLIC BLOOD PRESSURE: 102 MMHG

## 2018-08-14 VITALS — DIASTOLIC BLOOD PRESSURE: 78 MMHG | SYSTOLIC BLOOD PRESSURE: 113 MMHG

## 2018-08-14 VITALS — SYSTOLIC BLOOD PRESSURE: 135 MMHG | DIASTOLIC BLOOD PRESSURE: 84 MMHG

## 2018-08-14 VITALS — SYSTOLIC BLOOD PRESSURE: 98 MMHG | DIASTOLIC BLOOD PRESSURE: 67 MMHG

## 2018-08-14 VITALS — SYSTOLIC BLOOD PRESSURE: 124 MMHG | DIASTOLIC BLOOD PRESSURE: 80 MMHG

## 2018-08-14 VITALS — SYSTOLIC BLOOD PRESSURE: 100 MMHG | DIASTOLIC BLOOD PRESSURE: 69 MMHG

## 2018-08-14 VITALS — DIASTOLIC BLOOD PRESSURE: 79 MMHG | SYSTOLIC BLOOD PRESSURE: 111 MMHG

## 2018-08-14 VITALS — DIASTOLIC BLOOD PRESSURE: 72 MMHG | SYSTOLIC BLOOD PRESSURE: 105 MMHG

## 2018-08-14 VITALS — DIASTOLIC BLOOD PRESSURE: 76 MMHG | SYSTOLIC BLOOD PRESSURE: 105 MMHG

## 2018-08-14 VITALS — SYSTOLIC BLOOD PRESSURE: 103 MMHG | DIASTOLIC BLOOD PRESSURE: 67 MMHG

## 2018-08-14 VITALS — DIASTOLIC BLOOD PRESSURE: 71 MMHG | SYSTOLIC BLOOD PRESSURE: 105 MMHG

## 2018-08-14 VITALS — DIASTOLIC BLOOD PRESSURE: 74 MMHG | SYSTOLIC BLOOD PRESSURE: 113 MMHG

## 2018-08-14 VITALS — SYSTOLIC BLOOD PRESSURE: 117 MMHG | DIASTOLIC BLOOD PRESSURE: 78 MMHG

## 2018-08-14 VITALS — DIASTOLIC BLOOD PRESSURE: 74 MMHG | SYSTOLIC BLOOD PRESSURE: 109 MMHG

## 2018-08-14 VITALS — DIASTOLIC BLOOD PRESSURE: 73 MMHG | SYSTOLIC BLOOD PRESSURE: 101 MMHG

## 2018-08-14 VITALS — SYSTOLIC BLOOD PRESSURE: 111 MMHG | DIASTOLIC BLOOD PRESSURE: 79 MMHG

## 2018-08-14 RX ADMIN — PROPOFOL PRN MLS/HR: 10 INJECTION, EMULSION INTRAVENOUS at 04:33

## 2018-08-14 RX ADMIN — SODIUM CHLORIDE PRN MLS/HR: 900 INJECTION, SOLUTION INTRAVENOUS at 21:27

## 2018-08-14 RX ADMIN — SODIUM CHLORIDE SCH MG: 900 INJECTION INTRAVENOUS at 21:50

## 2018-08-14 RX ADMIN — FLUCONAZOLE IN SODIUM CHLORIDE SCH MLS/HR: 2 INJECTION, SOLUTION INTRAVENOUS at 09:11

## 2018-08-14 RX ADMIN — SODIUM CHLORIDE PRN MLS/HR: 900 INJECTION, SOLUTION INTRAVENOUS at 07:44

## 2018-08-14 RX ADMIN — SODIUM CHLORIDE PRN MLS/HR: 900 INJECTION, SOLUTION INTRAVENOUS at 00:39

## 2018-08-14 RX ADMIN — NICOTINE SCH MG: 7 PATCH, EXTENDED RELEASE TRANSDERMAL at 09:00

## 2018-08-14 RX ADMIN — Medication SCH MLS/HR: at 11:18

## 2018-08-14 RX ADMIN — Medication SCH ML: at 02:57

## 2018-08-14 RX ADMIN — Medication SCH ML: at 19:30

## 2018-08-14 RX ADMIN — LORAZEPAM SCH MG: 1 TABLET ORAL at 22:00

## 2018-08-14 RX ADMIN — Medication SCH ML: at 11:00

## 2018-08-14 RX ADMIN — Medication SCH MLS/HR: at 21:50

## 2018-08-14 RX ADMIN — SODIUM CHLORIDE SCH MG: 900 INJECTION INTRAVENOUS at 09:11

## 2018-08-14 RX ADMIN — Medication SCH ML: at 07:00

## 2018-08-14 RX ADMIN — LORAZEPAM SCH MG: 1 TABLET ORAL at 14:44

## 2018-08-14 RX ADMIN — Medication SCH MLS/HR: at 17:00

## 2018-08-14 RX ADMIN — LORAZEPAM SCH MG: 1 TABLET ORAL at 05:23

## 2018-08-14 RX ADMIN — Medication SCH ML: at 13:55

## 2018-08-14 RX ADMIN — SODIUM CHLORIDE SCH MLS/HR: 900 INJECTION INTRAVENOUS at 14:35

## 2018-08-14 RX ADMIN — Medication SCH ML: at 22:55

## 2018-08-14 NOTE — DIAGNOSTIC IMAGING REPORT
PROCEDURE: CT CHEST WITHOUT CONTRAST

CT scan of the chest WITHOUT intravenous contrast, using standard 

protocol.

 

TECHNIQUE:

The chest was scanned utilizing a multidetector helical scanner from 

the apex to the level of the adrenal glands.  No IV contrast was 

administered per physicians request. Coronal and sagittal multiplanar 

reformations were obtained.

 

COMPARISON: Patients [facility], CT, CT CHEST W, 7/31/2018, 10:33.

 

INDICATIONS:   PNEUMONIA

     

FINDINGS:

Lines/tubes: Tracheostomy tube in place. Right-sided PICC line has 

distal tip in the proximal right atrium.

 

Lungs and Airways: Extensive bilateral groundglass opacities involving 

the upper and lower lobes, some of which are confluent in the central 

aspect of the upper lobes (for example series 3, image 41) and in the 

lingula (series 3, image 82).

Associated interlobular septal thickening.

Focal consolidation in the right lower lobe, which is decreased since 

the prior exam, however, is there is interval development of a 2.4 x 

2.3 cm cystic/cavitary lesion (series 3, image 76).

Compressive atelectasis of bilateral lower lobes and to a lesser degree 

lateral aspect of the right middle lobe.

No definite masses or nodules.

Airways are clear, without endobronchial lesions.

Pleura: Moderate right and small to moderate left pleural effusions. No 

pneumothorax.

 

Heart and mediastinum: Thyroid is unremarkable. Heart size is normal. 

No pericardial effusion. Aorta is non-aneurysmal. The main pulmonary 

artery is in the upper limit of normal, measuring 3.0 cm.

 

Lymph nodes: Likely enlarged subcarinal lymph node which measures 1.0 

cm in short axis, stable. No other mediastinal or any axillary 

adenopathy. Difficult to evaluate for hilar adenopathy given the lack 

of intravenous contrast.

Abdomen: Limited views of the upper abdomen show no abnormality within 

the visualized liver, spleen, pancreas, or left kidney. Stable 

thickening of the left adrenal gland. Right adrenal gland is 

unremarkable

 

Bones: No aggressive lytic lesions. Bilateral breast implants.

 

IMPRESSION: 

 

1. Extensive bilateral groundglass opacities involving the upper and 

lower lobes, some of which are confluent in the central aspect of the 

upper lobes and in the lingula, with associated interlobular septal 

thickening. Differential diagnosis includes worsening pneumonia, in the 

appropriate clinical setting, superimposed fluid overload/edema and 

ARDS.

2. Interval decrease in size of the previously visualized focal 

consolidation in the right lower lobe, however, there is interval 

development of a cystic/cavitary lesion.

3. Moderate right and small-to-moderate left pleural effusions with 

associated compressive atelectasis of bilateral lower lobes and to a 

lesser degree lateral aspect of the right middle lobe.

 

 

Paul Sheriff M.D.  

Dictated by:  Paul Sheriff M.D. on 8/14/2018 at 18:05     

Electronically approved by:  Paul Sheriff M.D. on 8/14/2018 at 

19:03

## 2018-08-14 NOTE — PROGRESS NOTE
DATE:  August 13, 2018 



GI PROGRESS REPORT



SUBJECTIVE:  Patient is status post tracheostomy, eyes open, barely follows 

verbal command.



REVIEW OF SYSTEMS:  Unobtainable.



MEDICATIONS:  Reviewed MAR.



PHYSICAL EXAMINATION

VITAL SIGNS:  Temperature 98.9, pulse 124 to 127, respirations 28, blood 

pressure 110/72, and oxygen saturation 98% on mechanical ventilator.

GENERAL:  Not in acute distress.  Status post tracheostomy.  Oral mucosa is 

moist.  Anicteric sclerae.  NG is in place.

CVS:  S1 and S2 regular.  Tachy with a 3/6 flow murmur at the apex.

LUNGS:  Bilaterally grossly clear with decreased breath sounds at bases.

ABDOMEN:  Soft, nondistended, and nontender.  No palpable mass or hernia.  

Shifting dullness not digitally appreciable.

EXTREMITIES:  Warm.  No leg edema.



LABS:  On August, 12, 2018, WBC has come down to 18.96 from 23.78, 

hemoglobin 8.6 from 9.9, hematocrit 26.7 from 28.6, and platelet count 

218,000.  Sodium 143, potassium 3.5, chloride 112, bicarb 22, BUN 25, 

creatinine 0.50, and glucose 107.  PT/INR checked on August 4, 2018, showed 

PT 13.4 and INR 1.10.



IMPRESSIONS

1.  Alcoholic liver cirrhosis with very mild ascites.

2.  Aspiration pneumonia, bacteremia secondary to streptococcus.  Patient 

is currently being treated with broad-spectrum intravenous antibiotic under 

the infectious disease supervision.

3.  Alcoholic dependence.

4.  Respiratory failure, status post tracheostomy.



PLAN:  Continue NG feeding for now.  Hold NG feeding past midnight.  

EGD/PEG likely tomorrow.









DD:  08/13/2018 23:58

DT:  08/14/2018 00:18

Job#:  [id number]

## 2018-08-15 VITALS — SYSTOLIC BLOOD PRESSURE: 114 MMHG | DIASTOLIC BLOOD PRESSURE: 79 MMHG

## 2018-08-15 VITALS — SYSTOLIC BLOOD PRESSURE: 135 MMHG | DIASTOLIC BLOOD PRESSURE: 90 MMHG

## 2018-08-15 VITALS — DIASTOLIC BLOOD PRESSURE: 73 MMHG | SYSTOLIC BLOOD PRESSURE: 115 MMHG

## 2018-08-15 VITALS — DIASTOLIC BLOOD PRESSURE: 52 MMHG | SYSTOLIC BLOOD PRESSURE: 76 MMHG

## 2018-08-15 VITALS — DIASTOLIC BLOOD PRESSURE: 76 MMHG | SYSTOLIC BLOOD PRESSURE: 109 MMHG

## 2018-08-15 VITALS — DIASTOLIC BLOOD PRESSURE: 80 MMHG | SYSTOLIC BLOOD PRESSURE: 116 MMHG

## 2018-08-15 VITALS — DIASTOLIC BLOOD PRESSURE: 90 MMHG | SYSTOLIC BLOOD PRESSURE: 128 MMHG

## 2018-08-15 VITALS — DIASTOLIC BLOOD PRESSURE: 85 MMHG | SYSTOLIC BLOOD PRESSURE: 122 MMHG

## 2018-08-15 VITALS — DIASTOLIC BLOOD PRESSURE: 97 MMHG | SYSTOLIC BLOOD PRESSURE: 134 MMHG

## 2018-08-15 VITALS — SYSTOLIC BLOOD PRESSURE: 120 MMHG | DIASTOLIC BLOOD PRESSURE: 81 MMHG

## 2018-08-15 VITALS — SYSTOLIC BLOOD PRESSURE: 110 MMHG | DIASTOLIC BLOOD PRESSURE: 72 MMHG

## 2018-08-15 VITALS — SYSTOLIC BLOOD PRESSURE: 112 MMHG | DIASTOLIC BLOOD PRESSURE: 76 MMHG

## 2018-08-15 VITALS — SYSTOLIC BLOOD PRESSURE: 106 MMHG | DIASTOLIC BLOOD PRESSURE: 77 MMHG

## 2018-08-15 VITALS — SYSTOLIC BLOOD PRESSURE: 105 MMHG | DIASTOLIC BLOOD PRESSURE: 70 MMHG

## 2018-08-15 VITALS — DIASTOLIC BLOOD PRESSURE: 69 MMHG | SYSTOLIC BLOOD PRESSURE: 107 MMHG

## 2018-08-15 VITALS — SYSTOLIC BLOOD PRESSURE: 113 MMHG | DIASTOLIC BLOOD PRESSURE: 75 MMHG

## 2018-08-15 VITALS — SYSTOLIC BLOOD PRESSURE: 109 MMHG | DIASTOLIC BLOOD PRESSURE: 77 MMHG

## 2018-08-15 VITALS — SYSTOLIC BLOOD PRESSURE: 90 MMHG | DIASTOLIC BLOOD PRESSURE: 55 MMHG

## 2018-08-15 VITALS — DIASTOLIC BLOOD PRESSURE: 86 MMHG | SYSTOLIC BLOOD PRESSURE: 147 MMHG

## 2018-08-15 VITALS — SYSTOLIC BLOOD PRESSURE: 125 MMHG | DIASTOLIC BLOOD PRESSURE: 88 MMHG

## 2018-08-15 VITALS — DIASTOLIC BLOOD PRESSURE: 77 MMHG | SYSTOLIC BLOOD PRESSURE: 115 MMHG

## 2018-08-15 VITALS — DIASTOLIC BLOOD PRESSURE: 67 MMHG | SYSTOLIC BLOOD PRESSURE: 99 MMHG

## 2018-08-15 VITALS — DIASTOLIC BLOOD PRESSURE: 75 MMHG | SYSTOLIC BLOOD PRESSURE: 106 MMHG

## 2018-08-15 VITALS — SYSTOLIC BLOOD PRESSURE: 107 MMHG | DIASTOLIC BLOOD PRESSURE: 74 MMHG

## 2018-08-15 VITALS — SYSTOLIC BLOOD PRESSURE: 119 MMHG | DIASTOLIC BLOOD PRESSURE: 82 MMHG

## 2018-08-15 VITALS — DIASTOLIC BLOOD PRESSURE: 77 MMHG | SYSTOLIC BLOOD PRESSURE: 112 MMHG

## 2018-08-15 VITALS — DIASTOLIC BLOOD PRESSURE: 80 MMHG | SYSTOLIC BLOOD PRESSURE: 128 MMHG

## 2018-08-15 VITALS — SYSTOLIC BLOOD PRESSURE: 123 MMHG | DIASTOLIC BLOOD PRESSURE: 80 MMHG

## 2018-08-15 VITALS — SYSTOLIC BLOOD PRESSURE: 117 MMHG | DIASTOLIC BLOOD PRESSURE: 83 MMHG

## 2018-08-15 VITALS — SYSTOLIC BLOOD PRESSURE: 104 MMHG | DIASTOLIC BLOOD PRESSURE: 72 MMHG

## 2018-08-15 VITALS — DIASTOLIC BLOOD PRESSURE: 72 MMHG | SYSTOLIC BLOOD PRESSURE: 109 MMHG

## 2018-08-15 VITALS — SYSTOLIC BLOOD PRESSURE: 100 MMHG | DIASTOLIC BLOOD PRESSURE: 66 MMHG

## 2018-08-15 VITALS — DIASTOLIC BLOOD PRESSURE: 80 MMHG | SYSTOLIC BLOOD PRESSURE: 119 MMHG

## 2018-08-15 VITALS — DIASTOLIC BLOOD PRESSURE: 77 MMHG | SYSTOLIC BLOOD PRESSURE: 111 MMHG

## 2018-08-15 VITALS — DIASTOLIC BLOOD PRESSURE: 84 MMHG | SYSTOLIC BLOOD PRESSURE: 133 MMHG

## 2018-08-15 VITALS — DIASTOLIC BLOOD PRESSURE: 84 MMHG | SYSTOLIC BLOOD PRESSURE: 124 MMHG

## 2018-08-15 VITALS — SYSTOLIC BLOOD PRESSURE: 101 MMHG | DIASTOLIC BLOOD PRESSURE: 69 MMHG

## 2018-08-15 VITALS — SYSTOLIC BLOOD PRESSURE: 79 MMHG | DIASTOLIC BLOOD PRESSURE: 51 MMHG

## 2018-08-15 VITALS — SYSTOLIC BLOOD PRESSURE: 90 MMHG | DIASTOLIC BLOOD PRESSURE: 59 MMHG

## 2018-08-15 VITALS — DIASTOLIC BLOOD PRESSURE: 77 MMHG | SYSTOLIC BLOOD PRESSURE: 110 MMHG

## 2018-08-15 VITALS — DIASTOLIC BLOOD PRESSURE: 74 MMHG | SYSTOLIC BLOOD PRESSURE: 114 MMHG

## 2018-08-15 VITALS — SYSTOLIC BLOOD PRESSURE: 122 MMHG | DIASTOLIC BLOOD PRESSURE: 86 MMHG

## 2018-08-15 VITALS — DIASTOLIC BLOOD PRESSURE: 62 MMHG | SYSTOLIC BLOOD PRESSURE: 94 MMHG

## 2018-08-15 VITALS — DIASTOLIC BLOOD PRESSURE: 87 MMHG | SYSTOLIC BLOOD PRESSURE: 127 MMHG

## 2018-08-15 VITALS — SYSTOLIC BLOOD PRESSURE: 120 MMHG | DIASTOLIC BLOOD PRESSURE: 83 MMHG

## 2018-08-15 VITALS — DIASTOLIC BLOOD PRESSURE: 83 MMHG | SYSTOLIC BLOOD PRESSURE: 122 MMHG

## 2018-08-15 VITALS — DIASTOLIC BLOOD PRESSURE: 96 MMHG | SYSTOLIC BLOOD PRESSURE: 139 MMHG

## 2018-08-15 VITALS — DIASTOLIC BLOOD PRESSURE: 85 MMHG | SYSTOLIC BLOOD PRESSURE: 114 MMHG

## 2018-08-15 VITALS — DIASTOLIC BLOOD PRESSURE: 68 MMHG | SYSTOLIC BLOOD PRESSURE: 107 MMHG

## 2018-08-15 VITALS — DIASTOLIC BLOOD PRESSURE: 80 MMHG | SYSTOLIC BLOOD PRESSURE: 114 MMHG

## 2018-08-15 VITALS — DIASTOLIC BLOOD PRESSURE: 81 MMHG | SYSTOLIC BLOOD PRESSURE: 118 MMHG

## 2018-08-15 VITALS — DIASTOLIC BLOOD PRESSURE: 80 MMHG | SYSTOLIC BLOOD PRESSURE: 121 MMHG

## 2018-08-15 VITALS — DIASTOLIC BLOOD PRESSURE: 83 MMHG | SYSTOLIC BLOOD PRESSURE: 125 MMHG

## 2018-08-15 VITALS — DIASTOLIC BLOOD PRESSURE: 71 MMHG | SYSTOLIC BLOOD PRESSURE: 107 MMHG

## 2018-08-15 VITALS — DIASTOLIC BLOOD PRESSURE: 68 MMHG | SYSTOLIC BLOOD PRESSURE: 102 MMHG

## 2018-08-15 VITALS — SYSTOLIC BLOOD PRESSURE: 114 MMHG | DIASTOLIC BLOOD PRESSURE: 81 MMHG

## 2018-08-15 VITALS — SYSTOLIC BLOOD PRESSURE: 116 MMHG | DIASTOLIC BLOOD PRESSURE: 76 MMHG

## 2018-08-15 VITALS — SYSTOLIC BLOOD PRESSURE: 112 MMHG | DIASTOLIC BLOOD PRESSURE: 77 MMHG

## 2018-08-15 VITALS — DIASTOLIC BLOOD PRESSURE: 89 MMHG | SYSTOLIC BLOOD PRESSURE: 128 MMHG

## 2018-08-15 VITALS — SYSTOLIC BLOOD PRESSURE: 108 MMHG | DIASTOLIC BLOOD PRESSURE: 69 MMHG

## 2018-08-15 VITALS — DIASTOLIC BLOOD PRESSURE: 84 MMHG | SYSTOLIC BLOOD PRESSURE: 123 MMHG

## 2018-08-15 VITALS — SYSTOLIC BLOOD PRESSURE: 115 MMHG | DIASTOLIC BLOOD PRESSURE: 80 MMHG

## 2018-08-15 VITALS — DIASTOLIC BLOOD PRESSURE: 69 MMHG | SYSTOLIC BLOOD PRESSURE: 97 MMHG

## 2018-08-15 VITALS — SYSTOLIC BLOOD PRESSURE: 118 MMHG | DIASTOLIC BLOOD PRESSURE: 78 MMHG

## 2018-08-15 VITALS — DIASTOLIC BLOOD PRESSURE: 90 MMHG | SYSTOLIC BLOOD PRESSURE: 140 MMHG

## 2018-08-15 VITALS — SYSTOLIC BLOOD PRESSURE: 122 MMHG | DIASTOLIC BLOOD PRESSURE: 79 MMHG

## 2018-08-15 VITALS — DIASTOLIC BLOOD PRESSURE: 57 MMHG | SYSTOLIC BLOOD PRESSURE: 90 MMHG

## 2018-08-15 VITALS — DIASTOLIC BLOOD PRESSURE: 79 MMHG | SYSTOLIC BLOOD PRESSURE: 121 MMHG

## 2018-08-15 VITALS — DIASTOLIC BLOOD PRESSURE: 88 MMHG | SYSTOLIC BLOOD PRESSURE: 128 MMHG

## 2018-08-15 VITALS — SYSTOLIC BLOOD PRESSURE: 109 MMHG | DIASTOLIC BLOOD PRESSURE: 74 MMHG

## 2018-08-15 VITALS — SYSTOLIC BLOOD PRESSURE: 109 MMHG | DIASTOLIC BLOOD PRESSURE: 78 MMHG

## 2018-08-15 LAB
ANION GAP SERPL CALC-SCNC: 15.4 MMOL/L (ref 8–16)
ANISOCYTOSIS BLD QL SMEAR: (no result)
BASOPHILS # BLD AUTO: 0.1 10*3/UL (ref 0–0.1)
BASOPHILS NFR BLD AUTO: 0.4 % (ref 0–1)
BUN SERPL-MCNC: 19 MG/DL (ref 7–26)
BUN/CREAT SERPL: 34 (ref 6–25)
CALCIUM SERPL-MCNC: 7.8 MG/DL (ref 8.4–10.2)
CHLORIDE SERPL-SCNC: 117 MMOL/L (ref 98–107)
CO2 SERPL-SCNC: 19 MMOL/L (ref 22–29)
DEPRECATED INR PLAS: 1.45
DEPRECATED NEUTROPHILS # BLD AUTO: 17.9 10*3/UL (ref 2.1–6.9)
EGFRCR SERPLBLD CKD-EPI 2021: > 60 ML/MIN (ref 60–?)
EOSINOPHIL # BLD AUTO: 0.1 10*3/UL (ref 0–0.4)
EOSINOPHIL NFR BLD AUTO: 0.5 % (ref 0–6)
ERYTHROCYTE [DISTWIDTH] IN CORD BLOOD: 16.4 % (ref 11.7–14.4)
GLUCOSE SERPLBLD-MCNC: 83 MG/DL (ref 74–118)
HCT VFR BLD AUTO: 18.6 % (ref 34.2–44.1)
HGB BLD-MCNC: 5.9 G/DL (ref 12–16)
HYPOCHROMIA BLD QL SMEAR: (no result)
LYMPHOCYTES # BLD: 1.1 10*3/UL (ref 1–3.2)
LYMPHOCYTES NFR BLD AUTO: 4.4 % (ref 18–39.1)
LYMPHOCYTES NFR BLD MANUAL: 8 % (ref 19–48)
MCH RBC QN AUTO: 32.2 PG (ref 28–32)
MCHC RBC AUTO-ENTMCNC: 31.7 G/DL (ref 31–35)
MCV RBC AUTO: 101.6 FL (ref 81–99)
METAMYELOCYTES NFR BLD MANUAL: 1 % (ref 0–0)
MICROCYTES BLD QL SMEAR: (no result)
MONOCYTES # BLD AUTO: 0.6 10*3/UL (ref 0.2–0.8)
MONOCYTES NFR BLD AUTO: 2.6 % (ref 4.4–11.3)
NEUTS SEG NFR BLD AUTO: 74.8 % (ref 38.7–80)
NEUTS SEG NFR BLD MANUAL: 91 % (ref 40–74)
NRBC/RBC NFR BLD MANUAL: 3 %
PLAT MORPH BLD: (no result)
PLATELET # BLD AUTO: 247 X10E3/UL (ref 140–360)
PLATELET # BLD EST: ADEQUATE 10*3/UL
POIKILOCYTOSIS BLD QL SMEAR: SLIGHT
POTASSIUM SERPL-SCNC: 3.4 MMOL/L (ref 3.5–5.1)
PROTHROMBIN TIME: 16.6 SECONDS (ref 11.9–14.5)
RBC # BLD AUTO: 1.83 X10E6/UL (ref 3.6–5.1)
RBC MORPH BLD: (no result)
SODIUM SERPL-SCNC: 148 MMOL/L (ref 136–145)

## 2018-08-15 RX ADMIN — Medication SCH MLS/HR: at 17:00

## 2018-08-15 RX ADMIN — Medication SCH ML: at 22:45

## 2018-08-15 RX ADMIN — NICOTINE SCH MG: 7 PATCH, EXTENDED RELEASE TRANSDERMAL at 09:20

## 2018-08-15 RX ADMIN — Medication SCH ML: at 19:00

## 2018-08-15 RX ADMIN — SODIUM CHLORIDE SCH MG: 900 INJECTION INTRAVENOUS at 09:20

## 2018-08-15 RX ADMIN — Medication SCH MLS/HR: at 21:20

## 2018-08-15 RX ADMIN — Medication SCH ML: at 03:40

## 2018-08-15 RX ADMIN — FLUCONAZOLE IN SODIUM CHLORIDE SCH MLS/HR: 2 INJECTION, SOLUTION INTRAVENOUS at 09:20

## 2018-08-15 RX ADMIN — SODIUM CHLORIDE SCH MLS/HR: 900 INJECTION INTRAVENOUS at 09:57

## 2018-08-15 RX ADMIN — SODIUM CHLORIDE SCH MG: 900 INJECTION INTRAVENOUS at 21:20

## 2018-08-15 RX ADMIN — SODIUM CHLORIDE PRN MLS/HR: 900 INJECTION, SOLUTION INTRAVENOUS at 16:00

## 2018-08-15 RX ADMIN — Medication SCH ML: at 07:55

## 2018-08-15 RX ADMIN — HEPARIN SODIUM SCH UNIT: 5000 INJECTION INTRAVENOUS; SUBCUTANEOUS at 11:20

## 2018-08-15 RX ADMIN — LORAZEPAM SCH MG: 1 TABLET ORAL at 22:00

## 2018-08-15 RX ADMIN — HEPARIN SODIUM SCH UNIT: 5000 INJECTION INTRAVENOUS; SUBCUTANEOUS at 21:21

## 2018-08-15 RX ADMIN — ACETAMINOPHEN PRN MG: 325 SOLUTION ORAL at 17:30

## 2018-08-15 RX ADMIN — PROPOFOL PRN MLS/HR: 10 INJECTION, EMULSION INTRAVENOUS at 13:21

## 2018-08-15 RX ADMIN — LORAZEPAM SCH MG: 1 TABLET ORAL at 05:49

## 2018-08-15 RX ADMIN — LORAZEPAM SCH MG: 1 TABLET ORAL at 15:00

## 2018-08-15 RX ADMIN — Medication SCH ML: at 11:30

## 2018-08-15 RX ADMIN — SODIUM CHLORIDE PRN MLS/HR: 900 INJECTION, SOLUTION INTRAVENOUS at 06:00

## 2018-08-15 RX ADMIN — PROPOFOL PRN MLS/HR: 10 INJECTION, EMULSION INTRAVENOUS at 03:22

## 2018-08-15 RX ADMIN — ACETAMINOPHEN PRN MG: 325 SOLUTION ORAL at 07:45

## 2018-08-15 RX ADMIN — Medication SCH MLS/HR: at 10:44

## 2018-08-15 RX ADMIN — Medication SCH ML: at 15:15

## 2018-08-15 NOTE — PROGRESS NOTE
DATE:  August 15, 2018 



GI PROGRESS REPORT 



SUBJECTIVE:  Patient is currently verbally not responsive, status post 

tracheostomy, status post EGD, PEG.  G-tube feeding has not been started.  

PEG was placed yesterday.



REVIEW OF SYSTEMS:  Unobtainable. 



MEDICATIONS:  Reviewed as per MAR.



PHYSICAL EXAMINATION   

VITAL SIGNS:  Temperature 97.6 to 99.8, pulse 112, respirations 23 on 

current ventilator setting, blood pressure 147/86, oxygen saturation 97% on 

tracheostomy.  

GENERAL:  Eyes open.  Oral mucosa is moist.  Anicteric sclerae. 

CVS:  S1, S2 regular. 

LUNGS:  Bilaterally grossly clear with decreased breath sounds at bases. 

ABDOMEN:  Soft nondistended, nontender.  No palpable mass or hernia.  PEG 

site is clean.  External bumper at 2.5 cm from the skin.  No bleeding or 

discharge.  No bleeding or any leak.  Bowel sound present. 

EXTREMITIES:  Warm.  Trace leg edema. 



LABS:  WBC 24, which has gone up from 18.96, hemoglobin 5.9, hematocrit 

18.6, .6, platelet count 247,000.  Sodium 148, potassium 3.4, 

chloride 117, bicarb 19, BUN 19, creatinine 0.59, glucose 83.  



CT OF CHEST 

1. Extensive bilateral ground-glass opacities involving the upper and 

lower lobes.  

2. Interval decrease in the size of previously visualized focal 

consolidation in the right lower lobe.  Moderate right and 

small-to-moderate left pleural effusion.  Differential diagnosis 

includes worsening pneumonia in appropriate clinical setting, 

superimposed fluid overload/edema and ARDS.  



IMPRESSIONS

1. Status post percutaneous endoscopic gastrostomy tube, site is clear.  

No discharge or leak.  External bumper is 2.5.  

2. Three-point drop in hemoglobin without any gross gastrointestinal 

bleeding.



PLAN:  G-tube feeding can be restarted now.  Transfuse packed red blood 

cell to at least keep the hemoglobin above 7.  Rest of the care as per 

primary team.  











DD:  08/15/2018 21:13

DT:  08/15/2018 21:30

Job#:  [id number]

## 2018-08-16 VITALS — SYSTOLIC BLOOD PRESSURE: 145 MMHG | DIASTOLIC BLOOD PRESSURE: 97 MMHG

## 2018-08-16 VITALS — SYSTOLIC BLOOD PRESSURE: 125 MMHG | DIASTOLIC BLOOD PRESSURE: 89 MMHG

## 2018-08-16 VITALS — DIASTOLIC BLOOD PRESSURE: 114 MMHG | SYSTOLIC BLOOD PRESSURE: 161 MMHG

## 2018-08-16 VITALS — DIASTOLIC BLOOD PRESSURE: 84 MMHG | SYSTOLIC BLOOD PRESSURE: 138 MMHG

## 2018-08-16 VITALS — DIASTOLIC BLOOD PRESSURE: 90 MMHG | SYSTOLIC BLOOD PRESSURE: 137 MMHG

## 2018-08-16 VITALS — DIASTOLIC BLOOD PRESSURE: 97 MMHG | SYSTOLIC BLOOD PRESSURE: 154 MMHG

## 2018-08-16 VITALS — DIASTOLIC BLOOD PRESSURE: 93 MMHG | SYSTOLIC BLOOD PRESSURE: 136 MMHG

## 2018-08-16 VITALS — DIASTOLIC BLOOD PRESSURE: 92 MMHG | SYSTOLIC BLOOD PRESSURE: 138 MMHG

## 2018-08-16 VITALS — DIASTOLIC BLOOD PRESSURE: 90 MMHG | SYSTOLIC BLOOD PRESSURE: 130 MMHG

## 2018-08-16 VITALS — SYSTOLIC BLOOD PRESSURE: 136 MMHG | DIASTOLIC BLOOD PRESSURE: 87 MMHG

## 2018-08-16 VITALS — SYSTOLIC BLOOD PRESSURE: 146 MMHG | DIASTOLIC BLOOD PRESSURE: 95 MMHG

## 2018-08-16 VITALS — DIASTOLIC BLOOD PRESSURE: 79 MMHG | SYSTOLIC BLOOD PRESSURE: 117 MMHG

## 2018-08-16 VITALS — DIASTOLIC BLOOD PRESSURE: 94 MMHG | SYSTOLIC BLOOD PRESSURE: 157 MMHG

## 2018-08-16 VITALS — SYSTOLIC BLOOD PRESSURE: 133 MMHG | DIASTOLIC BLOOD PRESSURE: 82 MMHG

## 2018-08-16 VITALS — SYSTOLIC BLOOD PRESSURE: 140 MMHG | DIASTOLIC BLOOD PRESSURE: 98 MMHG

## 2018-08-16 VITALS — DIASTOLIC BLOOD PRESSURE: 99 MMHG | SYSTOLIC BLOOD PRESSURE: 149 MMHG

## 2018-08-16 VITALS — SYSTOLIC BLOOD PRESSURE: 142 MMHG | DIASTOLIC BLOOD PRESSURE: 99 MMHG

## 2018-08-16 VITALS — DIASTOLIC BLOOD PRESSURE: 95 MMHG | SYSTOLIC BLOOD PRESSURE: 130 MMHG

## 2018-08-16 VITALS — SYSTOLIC BLOOD PRESSURE: 154 MMHG | DIASTOLIC BLOOD PRESSURE: 97 MMHG

## 2018-08-16 VITALS — SYSTOLIC BLOOD PRESSURE: 110 MMHG | DIASTOLIC BLOOD PRESSURE: 81 MMHG

## 2018-08-16 VITALS — DIASTOLIC BLOOD PRESSURE: 90 MMHG | SYSTOLIC BLOOD PRESSURE: 139 MMHG

## 2018-08-16 VITALS — SYSTOLIC BLOOD PRESSURE: 135 MMHG | DIASTOLIC BLOOD PRESSURE: 89 MMHG

## 2018-08-16 VITALS — DIASTOLIC BLOOD PRESSURE: 99 MMHG | SYSTOLIC BLOOD PRESSURE: 144 MMHG

## 2018-08-16 VITALS — SYSTOLIC BLOOD PRESSURE: 162 MMHG | DIASTOLIC BLOOD PRESSURE: 107 MMHG

## 2018-08-16 VITALS — SYSTOLIC BLOOD PRESSURE: 151 MMHG | DIASTOLIC BLOOD PRESSURE: 98 MMHG

## 2018-08-16 VITALS — DIASTOLIC BLOOD PRESSURE: 73 MMHG | SYSTOLIC BLOOD PRESSURE: 114 MMHG

## 2018-08-16 VITALS — DIASTOLIC BLOOD PRESSURE: 91 MMHG | SYSTOLIC BLOOD PRESSURE: 152 MMHG

## 2018-08-16 VITALS — SYSTOLIC BLOOD PRESSURE: 107 MMHG | DIASTOLIC BLOOD PRESSURE: 71 MMHG

## 2018-08-16 VITALS — SYSTOLIC BLOOD PRESSURE: 126 MMHG | DIASTOLIC BLOOD PRESSURE: 81 MMHG

## 2018-08-16 VITALS — SYSTOLIC BLOOD PRESSURE: 144 MMHG | DIASTOLIC BLOOD PRESSURE: 96 MMHG

## 2018-08-16 VITALS — SYSTOLIC BLOOD PRESSURE: 114 MMHG | DIASTOLIC BLOOD PRESSURE: 78 MMHG

## 2018-08-16 VITALS — DIASTOLIC BLOOD PRESSURE: 91 MMHG | SYSTOLIC BLOOD PRESSURE: 132 MMHG

## 2018-08-16 VITALS — DIASTOLIC BLOOD PRESSURE: 88 MMHG | SYSTOLIC BLOOD PRESSURE: 136 MMHG

## 2018-08-16 VITALS — SYSTOLIC BLOOD PRESSURE: 154 MMHG | DIASTOLIC BLOOD PRESSURE: 94 MMHG

## 2018-08-16 VITALS — SYSTOLIC BLOOD PRESSURE: 137 MMHG | DIASTOLIC BLOOD PRESSURE: 93 MMHG

## 2018-08-16 VITALS — DIASTOLIC BLOOD PRESSURE: 111 MMHG | SYSTOLIC BLOOD PRESSURE: 146 MMHG

## 2018-08-16 VITALS — DIASTOLIC BLOOD PRESSURE: 99 MMHG | SYSTOLIC BLOOD PRESSURE: 143 MMHG

## 2018-08-16 VITALS — DIASTOLIC BLOOD PRESSURE: 93 MMHG | SYSTOLIC BLOOD PRESSURE: 147 MMHG

## 2018-08-16 VITALS — SYSTOLIC BLOOD PRESSURE: 132 MMHG | DIASTOLIC BLOOD PRESSURE: 89 MMHG

## 2018-08-16 VITALS — SYSTOLIC BLOOD PRESSURE: 122 MMHG | DIASTOLIC BLOOD PRESSURE: 81 MMHG

## 2018-08-16 VITALS — SYSTOLIC BLOOD PRESSURE: 155 MMHG | DIASTOLIC BLOOD PRESSURE: 105 MMHG

## 2018-08-16 VITALS — DIASTOLIC BLOOD PRESSURE: 86 MMHG | SYSTOLIC BLOOD PRESSURE: 126 MMHG

## 2018-08-16 VITALS — DIASTOLIC BLOOD PRESSURE: 76 MMHG | SYSTOLIC BLOOD PRESSURE: 106 MMHG

## 2018-08-16 VITALS — SYSTOLIC BLOOD PRESSURE: 143 MMHG | DIASTOLIC BLOOD PRESSURE: 94 MMHG

## 2018-08-16 VITALS — SYSTOLIC BLOOD PRESSURE: 143 MMHG | DIASTOLIC BLOOD PRESSURE: 91 MMHG

## 2018-08-16 VITALS — DIASTOLIC BLOOD PRESSURE: 101 MMHG | SYSTOLIC BLOOD PRESSURE: 155 MMHG

## 2018-08-16 VITALS — DIASTOLIC BLOOD PRESSURE: 92 MMHG | SYSTOLIC BLOOD PRESSURE: 140 MMHG

## 2018-08-16 VITALS — SYSTOLIC BLOOD PRESSURE: 133 MMHG | DIASTOLIC BLOOD PRESSURE: 89 MMHG

## 2018-08-16 VITALS — SYSTOLIC BLOOD PRESSURE: 145 MMHG | DIASTOLIC BLOOD PRESSURE: 96 MMHG

## 2018-08-16 VITALS — SYSTOLIC BLOOD PRESSURE: 150 MMHG | DIASTOLIC BLOOD PRESSURE: 96 MMHG

## 2018-08-16 VITALS — DIASTOLIC BLOOD PRESSURE: 99 MMHG | SYSTOLIC BLOOD PRESSURE: 141 MMHG

## 2018-08-16 VITALS — DIASTOLIC BLOOD PRESSURE: 105 MMHG | SYSTOLIC BLOOD PRESSURE: 156 MMHG

## 2018-08-16 VITALS — SYSTOLIC BLOOD PRESSURE: 136 MMHG | DIASTOLIC BLOOD PRESSURE: 81 MMHG

## 2018-08-16 VITALS — DIASTOLIC BLOOD PRESSURE: 105 MMHG | SYSTOLIC BLOOD PRESSURE: 158 MMHG

## 2018-08-16 VITALS — DIASTOLIC BLOOD PRESSURE: 98 MMHG | SYSTOLIC BLOOD PRESSURE: 153 MMHG

## 2018-08-16 VITALS — DIASTOLIC BLOOD PRESSURE: 92 MMHG | SYSTOLIC BLOOD PRESSURE: 146 MMHG

## 2018-08-16 VITALS — SYSTOLIC BLOOD PRESSURE: 130 MMHG | DIASTOLIC BLOOD PRESSURE: 90 MMHG

## 2018-08-16 VITALS — SYSTOLIC BLOOD PRESSURE: 132 MMHG | DIASTOLIC BLOOD PRESSURE: 86 MMHG

## 2018-08-16 LAB
ANION GAP SERPL CALC-SCNC: 13.7 MMOL/L (ref 8–16)
ANION GAP SERPL CALC-SCNC: 15.4 MMOL/L (ref 8–16)
ANISOCYTOSIS BLD QL SMEAR: SLIGHT
BASOPHILS # BLD AUTO: 0.1 10*3/UL (ref 0–0.1)
BASOPHILS NFR BLD AUTO: 0.5 % (ref 0–1)
BUN SERPL-MCNC: 18 MG/DL (ref 7–26)
BUN SERPL-MCNC: 18 MG/DL (ref 7–26)
BUN/CREAT SERPL: 30 (ref 6–25)
BUN/CREAT SERPL: 32 (ref 6–25)
CALCIUM SERPL-MCNC: 7.7 MG/DL (ref 8.4–10.2)
CALCIUM SERPL-MCNC: 7.8 MG/DL (ref 8.4–10.2)
CHLORIDE SERPL-SCNC: 112 MMOL/L (ref 98–107)
CHLORIDE SERPL-SCNC: 113 MMOL/L (ref 98–107)
CO2 SERPL-SCNC: 20 MMOL/L (ref 22–29)
CO2 SERPL-SCNC: 23 MMOL/L (ref 22–29)
DEPRECATED NEUTROPHILS # BLD AUTO: 19.1 10*3/UL (ref 2.1–6.9)
EGFRCR SERPLBLD CKD-EPI 2021: > 60 ML/MIN (ref 60–?)
EGFRCR SERPLBLD CKD-EPI 2021: > 60 ML/MIN (ref 60–?)
EOSINOPHIL # BLD AUTO: 0.5 10*3/UL (ref 0–0.4)
EOSINOPHIL NFR BLD AUTO: 1.9 % (ref 0–6)
EOSINOPHIL NFR BLD MANUAL: 2 % (ref 0–7)
ERYTHROCYTE [DISTWIDTH] IN CORD BLOOD: 16.8 % (ref 11.7–14.4)
GLUCOSE SERPLBLD-MCNC: 125 MG/DL (ref 74–118)
GLUCOSE SERPLBLD-MCNC: 131 MG/DL (ref 74–118)
HCT VFR BLD AUTO: 30.9 % (ref 34.2–44.1)
HGB BLD-MCNC: 10.6 G/DL (ref 12–16)
LYMPHOCYTES # BLD: 1.3 10*3/UL (ref 1–3.2)
LYMPHOCYTES NFR BLD AUTO: 5.1 % (ref 18–39.1)
LYMPHOCYTES NFR BLD MANUAL: 8 % (ref 19–48)
MCH RBC QN AUTO: 32.1 PG (ref 28–32)
MCHC RBC AUTO-ENTMCNC: 34.3 G/DL (ref 31–35)
MCV RBC AUTO: 93.6 FL (ref 81–99)
METAMYELOCYTES NFR BLD MANUAL: 2 % (ref 0–0)
MONOCYTES # BLD AUTO: 0.4 10*3/UL (ref 0.2–0.8)
MONOCYTES NFR BLD AUTO: 1.5 % (ref 4.4–11.3)
MONOCYTES NFR BLD MANUAL: 1 % (ref 3.4–9)
MYELOCYTES NFR BLD MANUAL: 4 % (ref 0–0)
NEUTS BAND NFR BLD MANUAL: 6 %
NEUTS SEG NFR BLD AUTO: 72.9 % (ref 38.7–80)
NEUTS SEG NFR BLD MANUAL: 76 % (ref 40–74)
NRBC/RBC NFR BLD MANUAL: 8 %
PLAT MORPH BLD: (no result)
PLATELET # BLD AUTO: 224 X10E3/UL (ref 140–360)
PLATELET # BLD EST: ADEQUATE 10*3/UL
POLYCHROMASIA BLD QL SMEAR: (no result)
POTASSIUM SERPL-SCNC: 2.7 MMOL/L (ref 3.5–5.1)
POTASSIUM SERPL-SCNC: 3.4 MMOL/L (ref 3.5–5.1)
RBC # BLD AUTO: 3.3 X10E6/UL (ref 3.6–5.1)
RBC MORPH BLD: NORMAL
SODIUM SERPL-SCNC: 145 MMOL/L (ref 136–145)
SODIUM SERPL-SCNC: 146 MMOL/L (ref 136–145)

## 2018-08-16 RX ADMIN — SODIUM CHLORIDE SCH MG: 900 INJECTION INTRAVENOUS at 09:47

## 2018-08-16 RX ADMIN — Medication SCH MLS/HR: at 12:29

## 2018-08-16 RX ADMIN — Medication SCH ML: at 11:30

## 2018-08-16 RX ADMIN — Medication SCH ML: at 02:30

## 2018-08-16 RX ADMIN — Medication SCH ML: at 15:22

## 2018-08-16 RX ADMIN — SODIUM CHLORIDE SCH MLS/HR: 900 INJECTION INTRAVENOUS at 09:47

## 2018-08-16 RX ADMIN — POTASSIUM CHLORIDE PRN MEQ: 1.5 SOLUTION ORAL at 16:12

## 2018-08-16 RX ADMIN — Medication SCH ML: at 18:45

## 2018-08-16 RX ADMIN — SODIUM CHLORIDE PRN MLS/HR: 900 INJECTION, SOLUTION INTRAVENOUS at 13:00

## 2018-08-16 RX ADMIN — PROPOFOL PRN MLS/HR: 10 INJECTION, EMULSION INTRAVENOUS at 04:04

## 2018-08-16 RX ADMIN — Medication SCH MLS/HR: at 10:00

## 2018-08-16 RX ADMIN — HEPARIN SODIUM SCH UNIT: 5000 INJECTION INTRAVENOUS; SUBCUTANEOUS at 21:24

## 2018-08-16 RX ADMIN — LORAZEPAM SCH MG: 1 TABLET ORAL at 06:06

## 2018-08-16 RX ADMIN — LORAZEPAM SCH MG: 1 TABLET ORAL at 22:06

## 2018-08-16 RX ADMIN — NICOTINE SCH MG: 7 PATCH, EXTENDED RELEASE TRANSDERMAL at 09:47

## 2018-08-16 RX ADMIN — CHOLESTYRAMINE LIGHT SCH GM: 4 POWDER, FOR SUSPENSION ORAL at 16:11

## 2018-08-16 RX ADMIN — MUPIROCIN SCH GM: 20 OINTMENT TOPICAL at 16:11

## 2018-08-16 RX ADMIN — VANCOMYCIN HYDROCHLORIDE SCH MG: 250 CAPSULE ORAL at 17:46

## 2018-08-16 RX ADMIN — LORAZEPAM SCH MG: 1 TABLET ORAL at 13:57

## 2018-08-16 RX ADMIN — HEPARIN SODIUM SCH UNIT: 5000 INJECTION INTRAVENOUS; SUBCUTANEOUS at 09:49

## 2018-08-16 RX ADMIN — MUPIROCIN SCH GM: 20 OINTMENT TOPICAL at 12:27

## 2018-08-16 RX ADMIN — Medication SCH ML: at 22:25

## 2018-08-16 RX ADMIN — ACETAMINOPHEN PRN MG: 325 SOLUTION ORAL at 01:43

## 2018-08-16 RX ADMIN — FLUCONAZOLE IN SODIUM CHLORIDE SCH MLS/HR: 2 INJECTION, SOLUTION INTRAVENOUS at 07:59

## 2018-08-16 RX ADMIN — SODIUM CHLORIDE PRN MLS/HR: 900 INJECTION, SOLUTION INTRAVENOUS at 01:43

## 2018-08-16 RX ADMIN — ACETAMINOPHEN PRN MG: 325 SOLUTION ORAL at 16:12

## 2018-08-16 RX ADMIN — ACETAMINOPHEN PRN MG: 325 SOLUTION ORAL at 06:49

## 2018-08-16 RX ADMIN — Medication SCH ML: at 07:45

## 2018-08-16 RX ADMIN — SODIUM CHLORIDE SCH MG: 900 INJECTION INTRAVENOUS at 21:23

## 2018-08-16 RX ADMIN — VANCOMYCIN HYDROCHLORIDE SCH MG: 250 CAPSULE ORAL at 12:03

## 2018-08-16 RX ADMIN — Medication SCH MLS/HR: at 21:23

## 2018-08-16 NOTE — PROGRESS NOTE
DATE:  August 16, 2018 



GI PROGRESS NOTE 



SUBJECTIVE:  Patient is tolerating PEG feeding.



REVIEW OF SYSTEMS:  Unobtainable.  



MEDICATIONS:  As per MAR reviewed.  Getting vancomycin through the PEG as 

well.



PHYSICAL EXAMINATION   

VITAL SIGNS:  Temperature 99, pulse ranging from 106 to 108, respirations 

18, blood pressure 126/81, oxygen saturation 94% on room air. 

GENERAL:  Not in any acute distress, status post tracheostomy, tracheostomy 

site is clear.  No discharge.  

HEENT:  Oral mucosa is moist.  Anicteric sclerae. 

CVS:  S1, S2 regular. 

LUNGS:  Bilaterally grossly clear. 

ABDOMEN:  Soft, nondistended, nontender.  PEG site clean, no leak or any 

bleeding.  No palpable masses or hernia.  External bumper at 2.5 cm from 

the skin. 

EXTREMITIES:  Warm.  No leg edema. 



LABS:  Stool for C. diff toxin was negative on 08/07/2018.  WBC has gone up 

to 26.15 from 24, hemoglobin 10.6, hematocrit 30.9.  MCV 93.6, platelet 

count 224,000.  Sodium 145, potassium 3.4, chloride 113, bicarb 20, BUN 18, 

creatinine 0.57.



IMPRESSIONS

1. Status post tracheostomy, status post percutaneous endoscopic 

gastrostomy.  

2. Patient is in respiratory failure secondary to underlying sepsis 

initially intubated, although difficult to wean off; therefore, ended up 

getting tracheostomy.  Hemoglobin was noted 5.9 yesterday.  It has come 

up to 10.6, not clear whether she received any blood transfusion or any 

occult gastrointestinal bleeding.

3. Stool for Clostridium difficile toxin is negative.  Patient is 

getting vancomycin through the percutaneous endoscopic gastrostomy.  

4. Pneumonia/sepsis on broad-spectrum IV antibiotics, IV vancomycin.  

5. Alcoholic liver cirrhosis.

6. Polysubstance abuse.  



PLAN:  From GI standpoint,

1. Continue G-tube feeding.  Watch for tolerance.  Check residuals.  

Call GI for any G-tube dysfunction.  

2. Will discontinue vancomycin through the PEG, patient does not have 

any C. difficile-associated diarrhea.  PPI for GI prophylaxis.  Monitor 

stool.  No need to monitor hemoglobin.  Management of alcoholic liver 

cirrhosis will be done as an outpatient.

3. Rest of the care as per primary team.  











DD:  08/16/2018 21:42

DT:  08/16/2018 21:58

Job#:  [id number]

## 2018-08-17 VITALS — SYSTOLIC BLOOD PRESSURE: 121 MMHG | DIASTOLIC BLOOD PRESSURE: 73 MMHG

## 2018-08-17 VITALS — DIASTOLIC BLOOD PRESSURE: 98 MMHG | SYSTOLIC BLOOD PRESSURE: 156 MMHG

## 2018-08-17 VITALS — SYSTOLIC BLOOD PRESSURE: 158 MMHG | DIASTOLIC BLOOD PRESSURE: 101 MMHG

## 2018-08-17 VITALS — DIASTOLIC BLOOD PRESSURE: 79 MMHG | SYSTOLIC BLOOD PRESSURE: 124 MMHG

## 2018-08-17 VITALS — SYSTOLIC BLOOD PRESSURE: 160 MMHG | DIASTOLIC BLOOD PRESSURE: 103 MMHG

## 2018-08-17 VITALS — DIASTOLIC BLOOD PRESSURE: 82 MMHG | SYSTOLIC BLOOD PRESSURE: 132 MMHG

## 2018-08-17 VITALS — DIASTOLIC BLOOD PRESSURE: 106 MMHG | SYSTOLIC BLOOD PRESSURE: 165 MMHG

## 2018-08-17 VITALS — SYSTOLIC BLOOD PRESSURE: 152 MMHG | DIASTOLIC BLOOD PRESSURE: 97 MMHG

## 2018-08-17 VITALS — DIASTOLIC BLOOD PRESSURE: 87 MMHG | SYSTOLIC BLOOD PRESSURE: 128 MMHG

## 2018-08-17 VITALS — SYSTOLIC BLOOD PRESSURE: 120 MMHG | DIASTOLIC BLOOD PRESSURE: 78 MMHG

## 2018-08-17 VITALS — SYSTOLIC BLOOD PRESSURE: 140 MMHG | DIASTOLIC BLOOD PRESSURE: 98 MMHG

## 2018-08-17 VITALS — DIASTOLIC BLOOD PRESSURE: 80 MMHG | SYSTOLIC BLOOD PRESSURE: 125 MMHG

## 2018-08-17 VITALS — DIASTOLIC BLOOD PRESSURE: 101 MMHG | SYSTOLIC BLOOD PRESSURE: 149 MMHG

## 2018-08-17 VITALS — DIASTOLIC BLOOD PRESSURE: 84 MMHG | SYSTOLIC BLOOD PRESSURE: 127 MMHG

## 2018-08-17 VITALS — DIASTOLIC BLOOD PRESSURE: 98 MMHG | SYSTOLIC BLOOD PRESSURE: 151 MMHG

## 2018-08-17 VITALS — DIASTOLIC BLOOD PRESSURE: 94 MMHG | SYSTOLIC BLOOD PRESSURE: 145 MMHG

## 2018-08-17 VITALS — DIASTOLIC BLOOD PRESSURE: 85 MMHG | SYSTOLIC BLOOD PRESSURE: 157 MMHG

## 2018-08-17 VITALS — DIASTOLIC BLOOD PRESSURE: 88 MMHG | SYSTOLIC BLOOD PRESSURE: 127 MMHG

## 2018-08-17 VITALS — DIASTOLIC BLOOD PRESSURE: 105 MMHG | SYSTOLIC BLOOD PRESSURE: 154 MMHG

## 2018-08-17 VITALS — SYSTOLIC BLOOD PRESSURE: 161 MMHG | DIASTOLIC BLOOD PRESSURE: 96 MMHG

## 2018-08-17 VITALS — DIASTOLIC BLOOD PRESSURE: 104 MMHG | SYSTOLIC BLOOD PRESSURE: 149 MMHG

## 2018-08-17 VITALS — SYSTOLIC BLOOD PRESSURE: 151 MMHG | DIASTOLIC BLOOD PRESSURE: 99 MMHG

## 2018-08-17 VITALS — DIASTOLIC BLOOD PRESSURE: 98 MMHG | SYSTOLIC BLOOD PRESSURE: 154 MMHG

## 2018-08-17 VITALS — DIASTOLIC BLOOD PRESSURE: 94 MMHG | SYSTOLIC BLOOD PRESSURE: 148 MMHG

## 2018-08-17 VITALS — DIASTOLIC BLOOD PRESSURE: 106 MMHG | SYSTOLIC BLOOD PRESSURE: 163 MMHG

## 2018-08-17 VITALS — SYSTOLIC BLOOD PRESSURE: 141 MMHG | DIASTOLIC BLOOD PRESSURE: 87 MMHG

## 2018-08-17 VITALS — SYSTOLIC BLOOD PRESSURE: 135 MMHG | DIASTOLIC BLOOD PRESSURE: 86 MMHG

## 2018-08-17 VITALS — DIASTOLIC BLOOD PRESSURE: 88 MMHG | SYSTOLIC BLOOD PRESSURE: 131 MMHG

## 2018-08-17 VITALS — SYSTOLIC BLOOD PRESSURE: 140 MMHG | DIASTOLIC BLOOD PRESSURE: 95 MMHG

## 2018-08-17 VITALS — SYSTOLIC BLOOD PRESSURE: 137 MMHG | DIASTOLIC BLOOD PRESSURE: 89 MMHG

## 2018-08-17 VITALS — DIASTOLIC BLOOD PRESSURE: 81 MMHG | SYSTOLIC BLOOD PRESSURE: 129 MMHG

## 2018-08-17 VITALS — DIASTOLIC BLOOD PRESSURE: 91 MMHG | SYSTOLIC BLOOD PRESSURE: 148 MMHG

## 2018-08-17 VITALS — SYSTOLIC BLOOD PRESSURE: 127 MMHG | DIASTOLIC BLOOD PRESSURE: 87 MMHG

## 2018-08-17 VITALS — DIASTOLIC BLOOD PRESSURE: 77 MMHG | SYSTOLIC BLOOD PRESSURE: 122 MMHG

## 2018-08-17 VITALS — SYSTOLIC BLOOD PRESSURE: 145 MMHG | DIASTOLIC BLOOD PRESSURE: 95 MMHG

## 2018-08-17 VITALS — DIASTOLIC BLOOD PRESSURE: 89 MMHG | SYSTOLIC BLOOD PRESSURE: 142 MMHG

## 2018-08-17 VITALS — DIASTOLIC BLOOD PRESSURE: 86 MMHG | SYSTOLIC BLOOD PRESSURE: 128 MMHG

## 2018-08-17 VITALS — SYSTOLIC BLOOD PRESSURE: 129 MMHG | DIASTOLIC BLOOD PRESSURE: 83 MMHG

## 2018-08-17 VITALS — SYSTOLIC BLOOD PRESSURE: 127 MMHG | DIASTOLIC BLOOD PRESSURE: 82 MMHG

## 2018-08-17 VITALS — SYSTOLIC BLOOD PRESSURE: 122 MMHG | DIASTOLIC BLOOD PRESSURE: 87 MMHG

## 2018-08-17 VITALS — DIASTOLIC BLOOD PRESSURE: 103 MMHG | SYSTOLIC BLOOD PRESSURE: 158 MMHG

## 2018-08-17 VITALS — DIASTOLIC BLOOD PRESSURE: 88 MMHG | SYSTOLIC BLOOD PRESSURE: 142 MMHG

## 2018-08-17 VITALS — DIASTOLIC BLOOD PRESSURE: 100 MMHG | SYSTOLIC BLOOD PRESSURE: 155 MMHG

## 2018-08-17 VITALS — SYSTOLIC BLOOD PRESSURE: 160 MMHG | DIASTOLIC BLOOD PRESSURE: 98 MMHG

## 2018-08-17 VITALS — DIASTOLIC BLOOD PRESSURE: 101 MMHG | SYSTOLIC BLOOD PRESSURE: 150 MMHG

## 2018-08-17 VITALS — DIASTOLIC BLOOD PRESSURE: 89 MMHG | SYSTOLIC BLOOD PRESSURE: 130 MMHG

## 2018-08-17 VITALS — DIASTOLIC BLOOD PRESSURE: 94 MMHG | SYSTOLIC BLOOD PRESSURE: 155 MMHG

## 2018-08-17 VITALS — DIASTOLIC BLOOD PRESSURE: 99 MMHG | SYSTOLIC BLOOD PRESSURE: 148 MMHG

## 2018-08-17 VITALS — SYSTOLIC BLOOD PRESSURE: 137 MMHG | DIASTOLIC BLOOD PRESSURE: 94 MMHG

## 2018-08-17 LAB
ANION GAP SERPL CALC-SCNC: 13.1 MMOL/L (ref 8–16)
ANISOCYTOSIS BLD QL SMEAR: SLIGHT
APPEARANCE FLD: (no result)
BASOPHILS # BLD AUTO: 0.1 10*3/UL (ref 0–0.1)
BASOPHILS NFR BLD AUTO: 0.4 % (ref 0–1)
BUN SERPL-MCNC: 17 MG/DL (ref 7–26)
BUN/CREAT SERPL: 29 (ref 6–25)
CALCIUM SERPL-MCNC: 8.1 MG/DL (ref 8.4–10.2)
CHLORIDE SERPL-SCNC: 112 MMOL/L (ref 98–107)
CO2 SERPL-SCNC: 23 MMOL/L (ref 22–29)
COLOR FLD: (no result)
DEPRECATED LYMPHOCYTES FR FLD MANUAL: 10 %
DEPRECATED NEUTROPHILS # BLD AUTO: 20.5 10*3/UL (ref 2.1–6.9)
EGFRCR SERPLBLD CKD-EPI 2021: > 60 ML/MIN (ref 60–?)
EOSINOPHIL # BLD AUTO: 0.9 10*3/UL (ref 0–0.4)
EOSINOPHIL NFR BLD AUTO: 2.8 % (ref 0–6)
EOSINOPHIL NFR BLD MANUAL: 3 % (ref 0–7)
ERYTHROCYTE [DISTWIDTH] IN CORD BLOOD: 17 % (ref 11.7–14.4)
GLUCOSE SERPLBLD-MCNC: 127 MG/DL (ref 74–118)
HCT VFR BLD AUTO: 31.3 % (ref 34.2–44.1)
HGB BLD-MCNC: 10.5 G/DL (ref 12–16)
HYPOCHROMIA BLD QL SMEAR: SLIGHT
LYMPHOCYTES # BLD: 1.6 10*3/UL (ref 1–3.2)
LYMPHOCYTES NFR BLD AUTO: 5.2 % (ref 18–39.1)
LYMPHOCYTES NFR BLD MANUAL: 9 % (ref 19–48)
MCH RBC QN AUTO: 32.4 PG (ref 28–32)
MCHC RBC AUTO-ENTMCNC: 33.5 G/DL (ref 31–35)
MCV RBC AUTO: 96.6 FL (ref 81–99)
METAMYELOCYTES NFR BLD MANUAL: 3 % (ref 0–0)
MONOCYTES # BLD AUTO: 0.4 10*3/UL (ref 0.2–0.8)
MONOCYTES NFR BLD AUTO: 1.4 % (ref 4.4–11.3)
MONOCYTES NFR BLD MANUAL: 4 % (ref 3.4–9)
MONOS+MACROS NFR FLD MANUAL: 6 %
MYELOCYTES NFR BLD MANUAL: 2 % (ref 0–0)
NEUTROPHILS NFR FLD MANUAL: 84 %
NEUTS BAND NFR BLD MANUAL: 4 %
NEUTS SEG NFR BLD AUTO: 65.8 % (ref 38.7–80)
NEUTS SEG NFR BLD MANUAL: 73 % (ref 40–74)
NRBC/RBC NFR BLD MANUAL: 9 %
PLAT MORPH BLD: (no result)
PLATELET # BLD AUTO: 211 X10E3/UL (ref 140–360)
PLATELET # BLD EST: ADEQUATE 10*3/UL
POTASSIUM SERPL-SCNC: 3.1 MMOL/L (ref 3.5–5.1)
RBC # BLD AUTO: 3.24 X10E6/UL (ref 3.6–5.1)
RBC MORPH BLD: NORMAL
SODIUM SERPL-SCNC: 145 MMOL/L (ref 136–145)
SPECIMEN SOURCE FLD: (no result)
WBC # FLD MANUAL: 693 CELLS/UL

## 2018-08-17 PROCEDURE — 0W993ZX DRAINAGE OF RIGHT PLEURAL CAVITY, PERCUTANEOUS APPROACH, DIAGNOSTIC: ICD-10-PCS

## 2018-08-17 RX ADMIN — CHOLESTYRAMINE LIGHT SCH GM: 4 POWDER, FOR SUSPENSION ORAL at 09:02

## 2018-08-17 RX ADMIN — LORAZEPAM SCH MG: 1 TABLET ORAL at 05:56

## 2018-08-17 RX ADMIN — Medication SCH ML: at 15:53

## 2018-08-17 RX ADMIN — Medication SCH MLS/HR: at 10:31

## 2018-08-17 RX ADMIN — SODIUM CHLORIDE SCH MG: 900 INJECTION INTRAVENOUS at 09:02

## 2018-08-17 RX ADMIN — Medication SCH ML: at 11:26

## 2018-08-17 RX ADMIN — MUPIROCIN SCH GM: 20 OINTMENT TOPICAL at 09:02

## 2018-08-17 RX ADMIN — PROPOFOL PRN MLS/HR: 10 INJECTION, EMULSION INTRAVENOUS at 02:05

## 2018-08-17 RX ADMIN — FLUCONAZOLE IN SODIUM CHLORIDE SCH MLS/HR: 2 INJECTION, SOLUTION INTRAVENOUS at 09:02

## 2018-08-17 RX ADMIN — NICOTINE SCH MG: 7 PATCH, EXTENDED RELEASE TRANSDERMAL at 09:02

## 2018-08-17 RX ADMIN — Medication SCH ML: at 07:42

## 2018-08-17 RX ADMIN — SODIUM CHLORIDE PRN MLS/HR: 900 INJECTION, SOLUTION INTRAVENOUS at 13:41

## 2018-08-17 RX ADMIN — LORAZEPAM SCH MG: 1 TABLET ORAL at 15:47

## 2018-08-17 RX ADMIN — Medication SCH ML: at 01:55

## 2018-08-17 RX ADMIN — POTASSIUM CHLORIDE PRN MEQ: 1.5 SOLUTION ORAL at 05:56

## 2018-08-17 RX ADMIN — PROPOFOL PRN MLS/HR: 10 INJECTION, EMULSION INTRAVENOUS at 12:00

## 2018-08-17 RX ADMIN — HEPARIN SODIUM SCH UNIT: 5000 INJECTION INTRAVENOUS; SUBCUTANEOUS at 09:02

## 2018-08-17 RX ADMIN — ACETAMINOPHEN PRN MG: 325 SOLUTION ORAL at 05:56

## 2018-08-17 RX ADMIN — SODIUM CHLORIDE PRN MLS/HR: 900 INJECTION, SOLUTION INTRAVENOUS at 01:15

## 2018-08-17 RX ADMIN — SODIUM CHLORIDE SCH MLS/HR: 900 INJECTION INTRAVENOUS at 10:17

## 2018-08-17 NOTE — DIAGNOSTIC IMAGING REPORT
PROCEDURE: CHEST XRAY POST PROCEDURE

COMPARISON: [facility], DX, CHEST SINGLE (PORTABLE), 

8/17/2018, 4:36.

INDICATIONS: S/P THORACENTISIS

 

FINDINGS:



LUNGS: Diffuse airspace opacities persist consistent with multifocal 

pneumonia.

 

PLEURA: Decreased size of the right pleural effusion. No pneumothorax.

 

HEART \T\ 

MEDIASTINUM: The heart is within normal size-limits.

 

BONES \T\

SOFT TISSUES:  No acute findings. No change in the tracheostomy or 

right sided PICC.

 

 

CONCLUSION:

1. Status post thoracentesis without evidence of a pneumothorax.

2. Decrease in size of the right pleural effusion.

3. Persistent airspace opacities. 

 

 

Olivier Sadler D.O.  

Dictated by:  Olivier Sadler D.O. on 8/17/2018 at 15:26     

Electronically approved by:  Olivier Sadler D.O. on 8/17/2018 at 15:26

## 2018-08-17 NOTE — DIAGNOSTIC IMAGING REPORT
PROCEDURE: ULTRASOUND GUIDED THORACENTESIS

COMPARISON: None.

INDICATIONS:PLEURAL EFFUSION

 

FINDINGS:

After informed consent was obtained, the patient was placed in the left 

side down position and preliminary ultrasound of the posterior chest 

identified a safe route into the right pleural effusion. The overlying 

skin was prepped and draped in usual sterile fashion. Lidocaine 1% was 

used for local anesthesia.

 

Under ultrasound guidance, a centesis needle was advanced into the 

pleural fluid and 450 cc of blood-tinged effusion were aspirated. The 

patient tolerated the procedure well and there were no immediate 

post-procedural complications. A post-thoracentesis chest radiograph 

will be obtained. Specimen was sent to the laboratory.

 

CONCLUSION:

Uncomplicated ultrasound-guided right thoracentesis with removal of 450 

cc of blood-tinged effusion..

 

 

Olivier Sadler D.O.  

Dictated by:  Olivier Sadler D.O. on 8/17/2018 at 15:01     

Electronically approved by:  Olivier Sadler D.O. on 8/17/2018 at 15:01

## 2018-08-17 NOTE — DIAGNOSTIC IMAGING REPORT
EXAM: CHEST SINGLE (PORTABLE), AP 1 view

INDICATION: ARDS

COMPARISON: 8/12/2018



FINDINGS:

LINES/TUBES: Stable tracheostomy tube. NG tube has been removed. Right PICC

line is stable in good position



LUNGS: Stable opacifications of the bilateral lungs. 



PLEURA: Small bilateral pleural effusions.



HEART AND MEDIASTINUM: Normal size and contour.



BONES AND SOFT TISSUES: No acute findings. 



IMPRESSION:

Stable airspace opacity involving both lungs compatible with multifocal

infection versus edema



Signed by: Dr. Elio Mathis M.D. on 8/17/2018 5:56 AM